# Patient Record
Sex: FEMALE | Race: WHITE | NOT HISPANIC OR LATINO | Employment: OTHER | URBAN - METROPOLITAN AREA
[De-identification: names, ages, dates, MRNs, and addresses within clinical notes are randomized per-mention and may not be internally consistent; named-entity substitution may affect disease eponyms.]

---

## 2023-03-27 ENCOUNTER — NURSING HOME VISIT (OUTPATIENT)
Dept: POST ACUTE CARE | Facility: EXTERNAL LOCATION | Age: 88
End: 2023-03-27
Payer: MEDICARE

## 2023-03-27 DIAGNOSIS — I15.9 SECONDARY HYPERTENSION: ICD-10-CM

## 2023-03-27 DIAGNOSIS — E03.9 HYPOTHYROIDISM, UNSPECIFIED TYPE: ICD-10-CM

## 2023-03-27 DIAGNOSIS — I48.91 ATRIAL FIBRILLATION, UNSPECIFIED TYPE (MULTI): ICD-10-CM

## 2023-03-27 DIAGNOSIS — G20.A1 PARKINSON'S DISEASE (MULTI): ICD-10-CM

## 2023-03-27 DIAGNOSIS — F41.8 DEPRESSION WITH ANXIETY: ICD-10-CM

## 2023-03-27 DIAGNOSIS — E56.9 VITAMIN DEFICIENCY: ICD-10-CM

## 2023-03-27 DIAGNOSIS — F03.C3 SEVERE DEMENTIA WITH MOOD DISTURBANCE, UNSPECIFIED DEMENTIA TYPE (MULTI): ICD-10-CM

## 2023-03-27 DIAGNOSIS — K21.9 GASTROESOPHAGEAL REFLUX DISEASE, UNSPECIFIED WHETHER ESOPHAGITIS PRESENT: ICD-10-CM

## 2023-03-27 PROCEDURE — 99309 SBSQ NF CARE MODERATE MDM 30: CPT | Performed by: INTERNAL MEDICINE

## 2023-03-27 NOTE — LETTER
Patient: Shanon Smith  : 8/3/1931    Encounter Date: 2023    Name: Shanon Smith  : 8/3/1931  MRN: [id number]  Visit Date: 3/27/2023  Chief Complaint: Monthly visit for management of chronic disease    HPI: 89 y/o CF who was admitted to [facility] on  from home with family for long term care due to her progressive dementia with behavioral disturbances. Patient will be admitted to the secure behavioral care unit at [facility].    Subjective: Seen and examined today. Laying in bed. Pleasantly confused on exam. Offers no complaints. Nursing reports no issues. I have reviewed nursing notes since my last visit and document any significant changes Reviewed orders, medications, Labs. Reviewed and updated Interval hx and meds reviewed. Reviewed chart looking at current medications, treatments, labs, x-rays etc.     ROS:  As above in subjective. Otherwise, all other systems have been reviewed and are negative for complaint.    Medications:  Medications reviewed and verified in NH chart.     Allergies:  Erythromycin, Bactrim     Vital Signs: Reviewed in [facility]    Physical Exam:  CONSTITUTIONAL: alert and oriented to self, confusion, no distress, cooperative, guarded  SKIN: Warm & Dry, no lesions, no rashes.  EYES: EOMI, clear sclera  ENMT: Mucous membranes moist, no apparent injury, no lesions seen. [place].   HEAD/NECK: No lymphadenopathy, thyromegaly or JVD.  HEART: Regular rate & rhythm, no murmurs, 2+ equal pulses of the extremities, Normal S1 & S2.  LUNGS: Patent airways, CTAB, normal breath sounds with good chest expansion, thorax symmetric  ABDOMEN: Nondistended, soft, slightly tender, +BS, no rebound tenderness or guarding.   EXTREMITIES: Normal extremities, no cyanosis, trace ankle edema, no contusions, no clubbing  NEUROLOGIC: intact senses, motor, response and reflexes ok, weakness    Results/Data:   Lab Results   Component Value Date    WBC 4.3 (L) 2021    HGB 13.8 2021    HCT 41.8  06/22/2021     (L) 06/22/2021    ALT 5 (L) 06/22/2021    AST 15 06/22/2021     06/22/2021    K 4.2 06/22/2021     06/22/2021    CREATININE 1.14 (H) 06/22/2021    BUN 22 06/22/2021    CO2 28 06/22/2021       Provider Impression:   Dementia with Behavioral Disturbances, Depression, Anxiety  - remain on secure behavioral unit  - continue with fluvoxamine, buspar  - fall precautions  - monitor behaviors  - consult psych as needed    Parkinson's Disease  - continue with carbidopa-levodopa at scheduled times  - monitor    CV- Afib, HTN, CHF  - continue with Eliqus for stroke prevention  - continue with digoxin for rate control  - monitor level PRN    Recurrent UTI's  - continue with suppressive atb with keflex  - continue with cranberry capsules    Constipation Prevention  - c/w stool softeners and laxatives PRN  - having regular bowel movements    Hypothyroidism  - continue with synthroid  - monitor level PRN    GERD  - continue with pepcid    Vitamin D + B12 deficiency  - continue with supplements    Insomnia  - continue with melatonin    Code Status  - [facility]  ----------------  Written by Balbina Mclaughlin LPN, acting as a scribe for Dr. Corrales. This note accurately reflects the work and decisions made by Dr. Corrales.     I, Dr. Corrales, attest all medical record entries made by the scribe were under my direction and were personally dictated by me. I have reviewed the chart and agree that the record accurately reflects my performance of the history, physical exam, and assessment and plan.       Electronically Signed By: Noni Land MD   5/16/23  1:17 PM

## 2023-05-04 ENCOUNTER — NURSING HOME VISIT (OUTPATIENT)
Dept: POST ACUTE CARE | Facility: EXTERNAL LOCATION | Age: 88
End: 2023-05-04
Payer: MEDICARE

## 2023-05-04 DIAGNOSIS — E03.9 HYPOTHYROIDISM, UNSPECIFIED TYPE: ICD-10-CM

## 2023-05-04 DIAGNOSIS — E56.9 VITAMIN DEFICIENCY: ICD-10-CM

## 2023-05-04 DIAGNOSIS — I15.9 SECONDARY HYPERTENSION: ICD-10-CM

## 2023-05-04 DIAGNOSIS — F41.8 DEPRESSION WITH ANXIETY: ICD-10-CM

## 2023-05-04 DIAGNOSIS — K21.9 GASTROESOPHAGEAL REFLUX DISEASE, UNSPECIFIED WHETHER ESOPHAGITIS PRESENT: ICD-10-CM

## 2023-05-04 DIAGNOSIS — I48.91 ATRIAL FIBRILLATION, UNSPECIFIED TYPE (MULTI): ICD-10-CM

## 2023-05-04 DIAGNOSIS — F03.C3 SEVERE DEMENTIA WITH MOOD DISTURBANCE, UNSPECIFIED DEMENTIA TYPE (MULTI): ICD-10-CM

## 2023-05-04 DIAGNOSIS — G20.A1 PARKINSON'S DISEASE (MULTI): ICD-10-CM

## 2023-05-04 PROCEDURE — 99308 SBSQ NF CARE LOW MDM 20: CPT | Performed by: INTERNAL MEDICINE

## 2023-05-04 NOTE — LETTER
Patient: Shanon Smith  : 8/3/1931    Encounter Date: 2023    Name: Shanon Smith  : 8/3/1931  MRN: [id number]  Visit Date: 2023  Chief Complaint: Monthly visit for management of chronic disease    HPI: 89 y/o CF who was admitted to [facility] on  from home with family for long term care due to her progressive dementia with behavioral disturbances. Patient will be admitted to the secure behavioral care unit at [facility].    Subjective: Seen and examined today. Sitting up in wheelchair in common area, watching TV with a friend.. Pleasantly confused on exam. Offers no complaints. Nursing reports no issues. I have reviewed nursing notes since my last visit and document any significant changes Reviewed orders, medications, Labs. Reviewed and updated Interval hx and meds reviewed. Reviewed chart looking at current medications, treatments, labs, x-rays etc.     ROS:  As above in subjective. Otherwise, all other systems have been reviewed and are negative for complaint.    Medications:  Medications reviewed and verified in NH chart.     Allergies:  Erythromycin, Bactrim     Vital Signs: Reviewed in [facility]    Physical Exam:  CONSTITUTIONAL: alert and oriented to self, confusion, no distress, cooperative, guarded  SKIN: Warm & Dry, no lesions, no rashes.  EYES: EOMI, clear sclera  ENMT: Mucous membranes moist, no apparent injury, no lesions seen. [place].   HEAD/NECK: No lymphadenopathy, thyromegaly or JVD.  HEART: Regular rate & rhythm, no murmurs, 2+ equal pulses of the extremities, Normal S1 & S2.  LUNGS: Patent airways, CTAB, normal breath sounds with good chest expansion, thorax symmetric  ABDOMEN: Nondistended, soft, slightly tender, +BS, no rebound tenderness or guarding.   EXTREMITIES: Normal extremities, no cyanosis, trace ankle edema, no contusions, no clubbing  NEUROLOGIC: intact senses, motor, response and reflexes ok, weakness    Results/Data:   Lab Results   Component Value Date    WBC 4.3 (L)  06/22/2021    HGB 13.8 06/22/2021    HCT 41.8 06/22/2021     (L) 06/22/2021    ALT 5 (L) 06/22/2021    AST 15 06/22/2021     06/22/2021    K 4.2 06/22/2021     06/22/2021    CREATININE 1.14 (H) 06/22/2021    BUN 22 06/22/2021    CO2 28 06/22/2021       Provider Impression:   Dementia with Behavioral Disturbances, Depression, Anxiety  - remain on secure behavioral unit  - continue with fluvoxamine, buspar  - fall precautions  - monitor behaviors  - consult psych as needed    Parkinson's Disease  - continue with carbidopa-levodopa at scheduled times  - monitor    CV- Afib, HTN, CHF  - continue with Eliqus for stroke prevention  - continue with digoxin for rate control  - monitor level PRN    Recurrent UTI's  - continue with suppressive atb with keflex  - continue with cranberry capsules    Constipation Prevention  - c/w stool softeners and laxatives PRN  - having regular bowel movements    Hypothyroidism  - continue with synthroid  - monitor level PRN    GERD  - continue with pepcid    Vitamin D + B12 deficiency  - continue with supplements    Insomnia  - continue with melatonin    Code Status  - [facility]  ----------------  Written by Balbina Mclaughlin LPN, acting as a scribe for Dr. Corrales. This note accurately reflects the work and decisions made by Dr. Corrales.     I, Dr. Corrales, attest all medical record entries made by the scribe were under my direction and were personally dictated by me. I have reviewed the chart and agree that the record accurately reflects my performance of the history, physical exam, and assessment and plan.       Electronically Signed By: Noni Land MD   5/16/23  9:03 AM

## 2023-05-10 PROBLEM — E03.9 HYPOTHYROIDISM: Status: ACTIVE | Noted: 2023-05-10

## 2023-05-10 PROBLEM — K21.9 GASTROESOPHAGEAL REFLUX DISEASE: Status: ACTIVE | Noted: 2023-05-10

## 2023-05-10 PROBLEM — G20.A1 PARKINSON'S DISEASE (MULTI): Status: ACTIVE | Noted: 2023-05-10

## 2023-05-10 PROBLEM — I48.91 ATRIAL FIBRILLATION (MULTI): Status: ACTIVE | Noted: 2023-05-10

## 2023-05-10 PROBLEM — I15.9 SECONDARY HYPERTENSION: Status: ACTIVE | Noted: 2023-05-10

## 2023-05-10 PROBLEM — F41.8 DEPRESSION WITH ANXIETY: Status: ACTIVE | Noted: 2023-05-10

## 2023-05-10 PROBLEM — E56.9 VITAMIN DEFICIENCY: Status: ACTIVE | Noted: 2023-05-10

## 2023-05-10 PROBLEM — F03.C3 SEVERE DEMENTIA WITH MOOD DISTURBANCE (MULTI): Status: ACTIVE | Noted: 2023-05-10

## 2023-05-10 NOTE — PROGRESS NOTES
Name: Shanon Smith  : 8/3/1931  MRN: [id number]  Visit Date: 3/27/2023  Chief Complaint: Monthly visit for management of chronic disease    HPI: 89 y/o CF who was admitted to [facility] on  from home with family for long term care due to her progressive dementia with behavioral disturbances. Patient will be admitted to the secure behavioral care unit at [facility].    Subjective: Seen and examined today. Laying in bed. Pleasantly confused on exam. Offers no complaints. Nursing reports no issues. I have reviewed nursing notes since my last visit and document any significant changes Reviewed orders, medications, Labs. Reviewed and updated Interval hx and meds reviewed. Reviewed chart looking at current medications, treatments, labs, x-rays etc.     ROS:  As above in subjective. Otherwise, all other systems have been reviewed and are negative for complaint.    Medications:  Medications reviewed and verified in NH chart.     Allergies:  Erythromycin, Bactrim     Vital Signs: Reviewed in [facility]    Physical Exam:  CONSTITUTIONAL: alert and oriented to self, confusion, no distress, cooperative, guarded  SKIN: Warm & Dry, no lesions, no rashes.  EYES: EOMI, clear sclera  ENMT: Mucous membranes moist, no apparent injury, no lesions seen. [place].   HEAD/NECK: No lymphadenopathy, thyromegaly or JVD.  HEART: Regular rate & rhythm, no murmurs, 2+ equal pulses of the extremities, Normal S1 & S2.  LUNGS: Patent airways, CTAB, normal breath sounds with good chest expansion, thorax symmetric  ABDOMEN: Nondistended, soft, slightly tender, +BS, no rebound tenderness or guarding.   EXTREMITIES: Normal extremities, no cyanosis, trace ankle edema, no contusions, no clubbing  NEUROLOGIC: intact senses, motor, response and reflexes ok, weakness    Results/Data:   Lab Results   Component Value Date    WBC 4.3 (L) 2021    HGB 13.8 2021    HCT 41.8 2021     (L) 2021    ALT 5 (L) 2021    AST 15  06/22/2021     06/22/2021    K 4.2 06/22/2021     06/22/2021    CREATININE 1.14 (H) 06/22/2021    BUN 22 06/22/2021    CO2 28 06/22/2021       Provider Impression:   Dementia with Behavioral Disturbances, Depression, Anxiety  - remain on secure behavioral unit  - continue with fluvoxamine, buspar  - fall precautions  - monitor behaviors  - consult psych as needed    Parkinson's Disease  - continue with carbidopa-levodopa at scheduled times  - monitor    CV- Afib, HTN, CHF  - continue with Eliqus for stroke prevention  - continue with digoxin for rate control  - monitor level PRN    Recurrent UTI's  - continue with suppressive atb with keflex  - continue with cranberry capsules    Constipation Prevention  - c/w stool softeners and laxatives PRN  - having regular bowel movements    Hypothyroidism  - continue with synthroid  - monitor level PRN    GERD  - continue with pepcid    Vitamin D + B12 deficiency  - continue with supplements    Insomnia  - continue with melatonin    Code Status  - [facility]  ----------------  Written by Balbina Mclaughlin LPN, acting as a scribe for Dr. Corrales. This note accurately reflects the work and decisions made by Dr. Corrales.     I, Dr. Corrales, attest all medical record entries made by the scribe were under my direction and were personally dictated by me. I have reviewed the chart and agree that the record accurately reflects my performance of the history, physical exam, and assessment and plan.

## 2023-05-15 NOTE — PROGRESS NOTES
Name: Shanon Smith  : 8/3/1931  MRN: [id number]  Visit Date: 2023  Chief Complaint: Monthly visit for management of chronic disease    HPI: 89 y/o CF who was admitted to [facility] on  from home with family for long term care due to her progressive dementia with behavioral disturbances. Patient will be admitted to the secure behavioral care unit at [facility].    Subjective: Seen and examined today. Sitting up in wheelchair in common area, watching TV with a friend.. Pleasantly confused on exam. Offers no complaints. Nursing reports no issues. I have reviewed nursing notes since my last visit and document any significant changes Reviewed orders, medications, Labs. Reviewed and updated Interval hx and meds reviewed. Reviewed chart looking at current medications, treatments, labs, x-rays etc.     ROS:  As above in subjective. Otherwise, all other systems have been reviewed and are negative for complaint.    Medications:  Medications reviewed and verified in NH chart.     Allergies:  Erythromycin, Bactrim     Vital Signs: Reviewed in [facility]    Physical Exam:  CONSTITUTIONAL: alert and oriented to self, confusion, no distress, cooperative, guarded  SKIN: Warm & Dry, no lesions, no rashes.  EYES: EOMI, clear sclera  ENMT: Mucous membranes moist, no apparent injury, no lesions seen. [place].   HEAD/NECK: No lymphadenopathy, thyromegaly or JVD.  HEART: Regular rate & rhythm, no murmurs, 2+ equal pulses of the extremities, Normal S1 & S2.  LUNGS: Patent airways, CTAB, normal breath sounds with good chest expansion, thorax symmetric  ABDOMEN: Nondistended, soft, slightly tender, +BS, no rebound tenderness or guarding.   EXTREMITIES: Normal extremities, no cyanosis, trace ankle edema, no contusions, no clubbing  NEUROLOGIC: intact senses, motor, response and reflexes ok, weakness    Results/Data:   Lab Results   Component Value Date    WBC 4.3 (L) 2021    HGB 13.8 2021    HCT 41.8 2021     (L)  06/22/2021    ALT 5 (L) 06/22/2021    AST 15 06/22/2021     06/22/2021    K 4.2 06/22/2021     06/22/2021    CREATININE 1.14 (H) 06/22/2021    BUN 22 06/22/2021    CO2 28 06/22/2021       Provider Impression:   Dementia with Behavioral Disturbances, Depression, Anxiety  - remain on secure behavioral unit  - continue with fluvoxamine, buspar  - fall precautions  - monitor behaviors  - consult psych as needed    Parkinson's Disease  - continue with carbidopa-levodopa at scheduled times  - monitor    CV- Afib, HTN, CHF  - continue with Eliqus for stroke prevention  - continue with digoxin for rate control  - monitor level PRN    Recurrent UTI's  - continue with suppressive atb with keflex  - continue with cranberry capsules    Constipation Prevention  - c/w stool softeners and laxatives PRN  - having regular bowel movements    Hypothyroidism  - continue with synthroid  - monitor level PRN    GERD  - continue with pepcid    Vitamin D + B12 deficiency  - continue with supplements    Insomnia  - continue with melatonin    [facility]  [facility]  Written by Balbina Mclaughlin LPN, acting as a scribe for Dr. Corrales. This note accurately reflects the work and decisions made by Dr. Corrales.     I, Dr. Corrales, attest all medical record entries made by the scribe were under my direction and were personally dictated by me. I have reviewed the chart and agree that the record accurately reflects my performance of the history, physical exam, and assessment and plan.

## 2023-07-15 ENCOUNTER — NURSING HOME VISIT (OUTPATIENT)
Dept: POST ACUTE CARE | Facility: EXTERNAL LOCATION | Age: 88
End: 2023-07-15
Payer: MEDICARE

## 2023-07-15 DIAGNOSIS — K21.9 GASTROESOPHAGEAL REFLUX DISEASE, UNSPECIFIED WHETHER ESOPHAGITIS PRESENT: ICD-10-CM

## 2023-07-15 DIAGNOSIS — F41.8 DEPRESSION WITH ANXIETY: ICD-10-CM

## 2023-07-15 DIAGNOSIS — E03.9 HYPOTHYROIDISM, UNSPECIFIED TYPE: ICD-10-CM

## 2023-07-15 DIAGNOSIS — E56.9 VITAMIN DEFICIENCY: ICD-10-CM

## 2023-07-15 DIAGNOSIS — I15.9 SECONDARY HYPERTENSION: ICD-10-CM

## 2023-07-15 DIAGNOSIS — G20.A1 PARKINSON'S DISEASE (MULTI): ICD-10-CM

## 2023-07-15 DIAGNOSIS — I48.91 ATRIAL FIBRILLATION, UNSPECIFIED TYPE (MULTI): ICD-10-CM

## 2023-07-15 DIAGNOSIS — F03.C3 SEVERE DEMENTIA WITH MOOD DISTURBANCE, UNSPECIFIED DEMENTIA TYPE (MULTI): ICD-10-CM

## 2023-07-15 PROCEDURE — 99308 SBSQ NF CARE LOW MDM 20: CPT | Performed by: INTERNAL MEDICINE

## 2023-07-15 NOTE — LETTER
Patient: Shanon Smith  : 8/3/1931    Encounter Date: 07/15/2023    Name: Shanon Smith  : 8/3/1931  MRN: [id number]  Visit Date: 7/15/2023  Chief Complaint: Monthly visit for management of chronic disease    HPI: 89 y/o CF who was admitted to [facility] on  from home with family for long term care due to her progressive dementia with behavioral disturbances. Patient will be admitted to the secure behavioral care unit at [facility].    Subjective: Seen and examined today. Sitting up in wheelchair in common area. Pleasantly confused on exam. Offers no complaints. Nursing reports no issues. I have reviewed nursing notes since my last visit and document any significant changes Reviewed orders, medications, Labs. Reviewed and updated Interval hx and meds reviewed. Reviewed chart looking at current medications, treatments, labs, x-rays etc.     ROS:  As above in subjective. Otherwise, all other systems have been reviewed and are negative for complaint.    Medications:  Medications reviewed and verified in NH chart.     Allergies:  Erythromycin, Bactrim     Vital Signs: Reviewed in [facility]    Physical Exam:  CONSTITUTIONAL: alert and oriented to self, confusion, no distress, cooperative, guarded  SKIN: Warm & Dry, no lesions, no rashes.  EYES: EOMI, clear sclera  ENMT: Mucous membranes moist, no apparent injury, no lesions seen. [place].   HEAD/NECK: No lymphadenopathy, thyromegaly or JVD.  HEART: Regular rate & rhythm, no murmurs, 2+ equal pulses of the extremities, Normal S1 & S2.  LUNGS: Patent airways, CTAB, normal breath sounds with good chest expansion, thorax symmetric  ABDOMEN: Nondistended, soft, slightly tender, +BS, no rebound tenderness or guarding.   EXTREMITIES: Normal extremities, no cyanosis, trace ankle edema, no contusions, no clubbing  NEUROLOGIC: intact senses, motor, response and reflexes ok, weakness    Results/Data:   Lab Results   Component Value Date    WBC 5.8 2023    HGB 13.2  08/16/2023    HCT 41.1 08/16/2023     08/16/2023    ALT 6 (L) 08/16/2023    AST 21 08/16/2023     08/16/2023    K 4.3 08/16/2023     08/16/2023    CREATININE 1.19 (H) 08/16/2023    BUN 20 08/16/2023    CO2 26 08/16/2023    INR 1.3 (H) 08/16/2023       Provider Impression:   Dementia with Behavioral Disturbances, Depression, Anxiety  - remain on secure behavioral unit  - continue with fluvoxamine, buspar  - fall precautions  - monitor behaviors  - consult psych as needed    Parkinson's Disease  - continue with carbidopa-levodopa at scheduled times  - monitor    CV- Afib, HTN, CHF  - continue with Eliqus for stroke prevention  - continue with digoxin for rate control  - monitor level PRN    Recurrent UTI's  - continue with suppressive atb with keflex  - continue with cranberry capsules    Constipation Prevention  - c/w stool softeners and laxatives PRN  - having regular bowel movements    Hypothyroidism  - continue with synthroid  - monitor level PRN    GERD  - continue with pepcid    Vitamin D + B12 deficiency  - continue with supplements    Insomnia  - continue with melatonin    Code Status  - [facility]  ----------------  Written by Balbina Mclaughlin RN, acting as a scribe for Dr. Corrales. This note accurately reflects the work and decisions made by Dr. Corrales.     I, Dr. Corrales, attest all medical record entries made by the scribe were under my direction and were personally dictated by me. I have reviewed the chart and agree that the record accurately reflects my performance of the history, physical exam, and assessment and plan.       Electronically Signed By: Noni Land MD   9/6/23  3:56 PM

## 2023-09-06 PROBLEM — Z78.9 NURSING HOME RESIDENT: Status: ACTIVE | Noted: 2023-09-06

## 2023-09-06 NOTE — PROGRESS NOTES
Name: Shanon Smith  : 8/3/1931  MRN: [id number]  Visit Date: 7/15/2023  Chief Complaint: Monthly visit for management of chronic disease    HPI: 89 y/o CF who was admitted to [facility] on  from home with family for long term care due to her progressive dementia with behavioral disturbances. Patient will be admitted to the secure behavioral care unit at [facility].    Subjective: Seen and examined today. Sitting up in wheelchair in common area. Pleasantly confused on exam. Offers no complaints. Nursing reports no issues. I have reviewed nursing notes since my last visit and document any significant changes Reviewed orders, medications, Labs. Reviewed and updated Interval hx and meds reviewed. Reviewed chart looking at current medications, treatments, labs, x-rays etc.     ROS:  As above in subjective. Otherwise, all other systems have been reviewed and are negative for complaint.    Medications:  Medications reviewed and verified in NH chart.     Allergies:  Erythromycin, Bactrim     Vital Signs: Reviewed in [facility]    Physical Exam:  CONSTITUTIONAL: alert and oriented to self, confusion, no distress, cooperative, guarded  SKIN: Warm & Dry, no lesions, no rashes.  EYES: EOMI, clear sclera  ENMT: Mucous membranes moist, no apparent injury, no lesions seen. [place].   HEAD/NECK: No lymphadenopathy, thyromegaly or JVD.  HEART: Regular rate & rhythm, no murmurs, 2+ equal pulses of the extremities, Normal S1 & S2.  LUNGS: Patent airways, CTAB, normal breath sounds with good chest expansion, thorax symmetric  ABDOMEN: Nondistended, soft, slightly tender, +BS, no rebound tenderness or guarding.   EXTREMITIES: Normal extremities, no cyanosis, trace ankle edema, no contusions, no clubbing  NEUROLOGIC: intact senses, motor, response and reflexes ok, weakness    Results/Data:   Lab Results   Component Value Date    WBC 5.8 2023    HGB 13.2 2023    HCT 41.1 2023     2023    ALT 6 (L)  08/16/2023    AST 21 08/16/2023     08/16/2023    K 4.3 08/16/2023     08/16/2023    CREATININE 1.19 (H) 08/16/2023    BUN 20 08/16/2023    CO2 26 08/16/2023    INR 1.3 (H) 08/16/2023       Provider Impression:   Dementia with Behavioral Disturbances, Depression, Anxiety  - remain on secure behavioral unit  - continue with fluvoxamine, buspar  - fall precautions  - monitor behaviors  - consult psych as needed    Parkinson's Disease  - continue with carbidopa-levodopa at scheduled times  - monitor    CV- Afib, HTN, CHF  - continue with Eliqus for stroke prevention  - continue with digoxin for rate control  - monitor level PRN    Recurrent UTI's  - continue with suppressive atb with keflex  - continue with cranberry capsules    Constipation Prevention  - c/w stool softeners and laxatives PRN  - having regular bowel movements    Hypothyroidism  - continue with synthroid  - monitor level PRN    GERD  - continue with pepcid    Vitamin D + B12 deficiency  - continue with supplements    Insomnia  - continue with melatonin    [facility]  [facility]  Written by Balbina Mclaughlin RN, acting as a scribe for Dr. Corrales. This note accurately reflects the work and decisions made by Dr. Corrales.     I, Dr. Corrales, attest all medical record entries made by the scribe were under my direction and were personally dictated by me. I have reviewed the chart and agree that the record accurately reflects my performance of the history, physical exam, and assessment and plan.

## 2023-09-09 ENCOUNTER — NURSING HOME VISIT (OUTPATIENT)
Dept: POST ACUTE CARE | Facility: EXTERNAL LOCATION | Age: 88
End: 2023-09-09
Payer: MEDICARE

## 2023-09-09 DIAGNOSIS — K21.9 GASTROESOPHAGEAL REFLUX DISEASE, UNSPECIFIED WHETHER ESOPHAGITIS PRESENT: ICD-10-CM

## 2023-09-09 DIAGNOSIS — E56.9 VITAMIN DEFICIENCY: ICD-10-CM

## 2023-09-09 DIAGNOSIS — G20.A1 PARKINSON'S DISEASE, UNSPECIFIED WHETHER DYSKINESIA PRESENT, UNSPECIFIED WHETHER MANIFESTATIONS FLUCTUATE (MULTI): ICD-10-CM

## 2023-09-09 DIAGNOSIS — F03.C3 SEVERE DEMENTIA WITH MOOD DISTURBANCE, UNSPECIFIED DEMENTIA TYPE (MULTI): ICD-10-CM

## 2023-09-09 DIAGNOSIS — I15.9 SECONDARY HYPERTENSION: ICD-10-CM

## 2023-09-09 DIAGNOSIS — E03.9 HYPOTHYROIDISM, UNSPECIFIED TYPE: ICD-10-CM

## 2023-09-09 DIAGNOSIS — I48.91 ATRIAL FIBRILLATION, UNSPECIFIED TYPE (MULTI): ICD-10-CM

## 2023-09-09 DIAGNOSIS — F41.8 DEPRESSION WITH ANXIETY: ICD-10-CM

## 2023-09-09 PROCEDURE — 99308 SBSQ NF CARE LOW MDM 20: CPT | Performed by: INTERNAL MEDICINE

## 2023-09-09 NOTE — LETTER
Patient: Shanon Smith  : 8/3/1931    Encounter Date: 2023    Name: Shanon Smith  : 8/3/1931  MRN: [id number]  Visit Date: 2023  Chief Complaint: Monthly visit for management of chronic disease    HPI: 89 y/o CF who was admitted to [facility] on  from home with family for long term care due to her progressive dementia with behavioral disturbances. Patient will be admitted to the secure behavioral care unit at [facility].    Subjective: Seen and examined today. Sitting up in wheelchair in common area. Pleasantly confused on exam. Offers no complaints. Nursing reports no issues. I have reviewed nursing notes since my last visit and document any significant changes Reviewed orders, medications, Labs. Reviewed and updated Interval hx and meds reviewed. Reviewed chart looking at current medications, treatments, labs, x-rays etc.     ROS:  As above in subjective. Otherwise, all other systems have been reviewed and are negative for complaint.    Medications:  Medications reviewed and verified in NH chart.     Vital Signs: Reviewed in [facility]    Physical Exam:  CONSTITUTIONAL: alert and oriented to self, confusion, no distress, cooperative, guarded  SKIN: Warm & Dry, no lesions, no rashes.  EYES: EOMI, clear sclera  ENMT: Mucous membranes moist, no apparent injury, no lesions seen. [place].   HEAD/NECK: No lymphadenopathy, thyromegaly or JVD.  HEART: Regular rate & rhythm, no murmurs, 2+ equal pulses of the extremities, Normal S1 & S2.  LUNGS: Patent airways, CTAB, normal breath sounds with good chest expansion, thorax symmetric  ABDOMEN: Nondistended, soft, slightly tender, +BS, no rebound tenderness or guarding.   EXTREMITIES: Normal extremities, no cyanosis, trace ankle edema, no contusions, no clubbing  NEUROLOGIC: intact senses, motor, response and reflexes ok, weakness    Results/Data:   Lab Results   Component Value Date    WBC 5.8 2023    HGB 13.2 2023    HCT 41.1 2023      08/16/2023    CHOL 163 12/16/2020    TRIG 112 12/16/2020    HDL 50 (L) 12/16/2020    ALT 6 (L) 08/16/2023    AST 21 08/16/2023     08/16/2023    K 4.3 08/16/2023     08/16/2023    CREATININE 1.19 (H) 08/16/2023    BUN 20 08/16/2023    CO2 26 08/16/2023    TSH 0.71 12/16/2020    INR 1.3 (H) 08/16/2023    HGBA1C 6.0 02/14/2021     Provider Impression:   Dementia with Behavioral Disturbances, Depression, Anxiety  - remain on secure behavioral unit  - continue with fluvoxamine, buspar  - fall precautions  - monitor behaviors  - consult psych as needed    Parkinson's Disease  - continue with carbidopa-levodopa at scheduled times  - monitor    CV- Afib, HTN, CHF  - continue with Eliqus for stroke prevention  - continue with digoxin for rate control  - monitor level PRN    Recurrent UTI's  - continue with suppressive atb with keflex  - continue with cranberry capsules    Constipation Prevention  - c/w stool softeners and laxatives PRN  - having regular bowel movements    Hypothyroidism  - continue with synthroid  - monitor level PRN    GERD  - continue with pepcid    Vitamin D + B12 deficiency  - continue with supplements    Insomnia  - continue with melatonin    Code Status  - [facility]  ----------------  Written by Balbina Mclaughlin RN, acting as a scribe for Dr. Corrales. This note accurately reflects the work and decisions made by Dr. Corrales.     I, Dr. Corrales, attest all medical record entries made by the scribe were under my direction and were personally dictated by me. I have reviewed the chart and agree that the record accurately reflects my performance of the history, physical exam, and assessment and plan.     Electronically Signed By: Noni Land MD   1/5/24  1:00 PM

## 2023-11-03 DIAGNOSIS — G20.B1 PARKINSON'S DISEASE WITH DYSKINESIA, UNSPECIFIED WHETHER MANIFESTATIONS FLUCTUATE (MULTI): ICD-10-CM

## 2023-11-04 ENCOUNTER — NURSING HOME VISIT (OUTPATIENT)
Dept: POST ACUTE CARE | Facility: EXTERNAL LOCATION | Age: 88
End: 2023-11-04
Payer: MEDICARE

## 2023-11-04 DIAGNOSIS — K21.9 GASTROESOPHAGEAL REFLUX DISEASE, UNSPECIFIED WHETHER ESOPHAGITIS PRESENT: ICD-10-CM

## 2023-11-04 DIAGNOSIS — I15.9 SECONDARY HYPERTENSION: ICD-10-CM

## 2023-11-04 DIAGNOSIS — E03.9 HYPOTHYROIDISM, UNSPECIFIED TYPE: ICD-10-CM

## 2023-11-04 DIAGNOSIS — I48.91 ATRIAL FIBRILLATION, UNSPECIFIED TYPE (MULTI): ICD-10-CM

## 2023-11-04 DIAGNOSIS — F41.8 DEPRESSION WITH ANXIETY: ICD-10-CM

## 2023-11-04 DIAGNOSIS — N39.0 URINARY TRACT INFECTION WITHOUT HEMATURIA, SITE UNSPECIFIED: ICD-10-CM

## 2023-11-04 DIAGNOSIS — G20.A1 PARKINSON'S DISEASE, UNSPECIFIED WHETHER DYSKINESIA PRESENT, UNSPECIFIED WHETHER MANIFESTATIONS FLUCTUATE (MULTI): ICD-10-CM

## 2023-11-04 DIAGNOSIS — F03.C3 SEVERE DEMENTIA WITH MOOD DISTURBANCE, UNSPECIFIED DEMENTIA TYPE (MULTI): ICD-10-CM

## 2023-11-04 PROCEDURE — 99308 SBSQ NF CARE LOW MDM 20: CPT | Performed by: INTERNAL MEDICINE

## 2023-11-04 NOTE — LETTER
Patient: Shanon Smith  : 8/3/1931    Encounter Date: 2023    Name: Shanon Smith  : 8/3/1931  MRN: [id number]  Visit Date: 2023  Chief Complaint: Monthly visit for management of chronic disease    HPI: 89 y/o CF who was admitted to [facility] on  from home with family for long term care due to her progressive dementia with behavioral disturbances. Patient will be admitted to the secure behavioral care unit at [facility].    Subjective: Seen and examined today. Sitting up in wheelchair in common area. Pleasantly confused on exam. Offers no complaints. Nursing reports no issues. I have reviewed nursing notes since my last visit and document any significant changes Reviewed orders, medications, Labs. Reviewed and updated Interval hx and meds reviewed. Reviewed chart looking at current medications, treatments, labs, x-rays etc.     ROS:  As above in subjective. Otherwise, all other systems have been reviewed and are negative for complaint.    Medications:  Medications reviewed and verified in NH chart.     Vital Signs: Reviewed in [facility]    Physical Exam:  CONSTITUTIONAL: alert and oriented to self, confusion, no distress, cooperative, guarded  SKIN: Warm & Dry, no lesions, no rashes.  EYES: EOMI, clear sclera  ENMT: Mucous membranes moist, no apparent injury, no lesions seen. [place].   HEAD/NECK: No lymphadenopathy, thyromegaly or JVD.  HEART: Regular rate & rhythm, no murmurs, 2+ equal pulses of the extremities, Normal S1 & S2.  LUNGS: Patent airways, CTAB, normal breath sounds with good chest expansion, thorax symmetric  ABDOMEN: Nondistended, soft, slightly tender, +BS, no rebound tenderness or guarding.   EXTREMITIES: Normal extremities, no cyanosis, trace ankle edema, no contusions, no clubbing  NEUROLOGIC: intact senses, motor, response and reflexes ok, weakness    Results/Data:   Lab Results   Component Value Date    WBC 5.8 2023    HGB 13.2 2023    HCT 41.1 2023    PLT  179 08/16/2023    CHOL 163 12/16/2020    TRIG 112 12/16/2020    HDL 50 (L) 12/16/2020    ALT 6 (L) 08/16/2023    AST 21 08/16/2023     08/16/2023    K 4.3 08/16/2023     08/16/2023    CREATININE 1.19 (H) 08/16/2023    BUN 20 08/16/2023    CO2 26 08/16/2023    TSH 0.71 12/16/2020    INR 1.3 (H) 08/16/2023    HGBA1C 6.0 02/14/2021     Provider Impression:   Dementia with Behavioral Disturbances, Depression, Anxiety  - remain on secure behavioral unit  - continue with fluvoxamine, buspar  - fall precautions  - monitor behaviors  - consult psych as needed    UTI  - c/w keflex 500mg TID x7d  - denies dysuria    Parkinson's Disease  - continue with carbidopa-levodopa at scheduled times  - monitor    CV- Afib, HTN, CHF  - continue with Eliqus for stroke prevention  - continue with digoxin for rate control  - monitor level PRN    Recurrent UTI's  - continue with suppressive atb with keflex  - continue with cranberry capsules    Constipation Prevention  - c/w stool softeners and laxatives PRN  - having regular bowel movements    Hypothyroidism  - continue with synthroid  - monitor level PRN    GERD  - continue with pepcid    Vitamin D + B12 deficiency  - continue with supplements    Insomnia  - continue with melatonin    Code Status  - [facility]  ----------------  Written by Balbina Mclaughlin RN, acting as a scribe for Dr. Corrales. This note accurately reflects the work and decisions made by Dr. Corrales.     I, Dr. Corrales, attest all medical record entries made by the scribe were under my direction and were personally dictated by me. I have reviewed the chart and agree that the record accurately reflects my performance of the history, physical exam, and assessment and plan.       Electronically Signed By: Noni Land MD   1/30/24 11:16 AM

## 2024-01-03 NOTE — PROGRESS NOTES
Name: Shanon Smith  : 8/3/1931  MRN: [id number]  Visit Date: 2023  Chief Complaint: Monthly visit for management of chronic disease    HPI: 89 y/o CF who was admitted to [facility] on  from home with family for long term care due to her progressive dementia with behavioral disturbances. Patient will be admitted to the secure behavioral care unit at [facility].    Subjective: Seen and examined today. Sitting up in wheelchair in common area. Pleasantly confused on exam. Offers no complaints. Nursing reports no issues. I have reviewed nursing notes since my last visit and document any significant changes Reviewed orders, medications, Labs. Reviewed and updated Interval hx and meds reviewed. Reviewed chart looking at current medications, treatments, labs, x-rays etc.     ROS:  As above in subjective. Otherwise, all other systems have been reviewed and are negative for complaint.    Medications:  Medications reviewed and verified in NH chart.     Vital Signs: Reviewed in [facility]    Physical Exam:  CONSTITUTIONAL: alert and oriented to self, confusion, no distress, cooperative, guarded  SKIN: Warm & Dry, no lesions, no rashes.  EYES: EOMI, clear sclera  ENMT: Mucous membranes moist, no apparent injury, no lesions seen. [place].   HEAD/NECK: No lymphadenopathy, thyromegaly or JVD.  HEART: Regular rate & rhythm, no murmurs, 2+ equal pulses of the extremities, Normal S1 & S2.  LUNGS: Patent airways, CTAB, normal breath sounds with good chest expansion, thorax symmetric  ABDOMEN: Nondistended, soft, slightly tender, +BS, no rebound tenderness or guarding.   EXTREMITIES: Normal extremities, no cyanosis, trace ankle edema, no contusions, no clubbing  NEUROLOGIC: intact senses, motor, response and reflexes ok, weakness    Results/Data:   Lab Results   Component Value Date    WBC 5.8 2023    HGB 13.2 2023    HCT 41.1 2023     2023    CHOL 163 2020    TRIG 112 2020    HDL 50 (L)  12/16/2020    ALT 6 (L) 08/16/2023    AST 21 08/16/2023     08/16/2023    K 4.3 08/16/2023     08/16/2023    CREATININE 1.19 (H) 08/16/2023    BUN 20 08/16/2023    CO2 26 08/16/2023    TSH 0.71 12/16/2020    INR 1.3 (H) 08/16/2023    HGBA1C 6.0 02/14/2021     Provider Impression:   Dementia with Behavioral Disturbances, Depression, Anxiety  - remain on secure behavioral unit  - continue with fluvoxamine, buspar  - fall precautions  - monitor behaviors  - consult psych as needed    Parkinson's Disease  - continue with carbidopa-levodopa at scheduled times  - monitor    CV- Afib, HTN, CHF  - continue with Eliqus for stroke prevention  - continue with digoxin for rate control  - monitor level PRN    Recurrent UTI's  - continue with suppressive atb with keflex  - continue with cranberry capsules    Constipation Prevention  - c/w stool softeners and laxatives PRN  - having regular bowel movements    Hypothyroidism  - continue with synthroid  - monitor level PRN    GERD  - continue with pepcid    Vitamin D + B12 deficiency  - continue with supplements    Insomnia  - continue with melatonin    Code Status  - [facility]  ----------------  Written by Balbina Mclaughlin RN, acting as a scribe for Dr. Corrales. This note accurately reflects the work and decisions made by Dr. Corrales.     I, Dr. Corrales, attest all medical record entries made by the scribe were under my direction and were personally dictated by me. I have reviewed the chart and agree that the record accurately reflects my performance of the history, physical exam, and assessment and plan.

## 2024-01-13 ENCOUNTER — NURSING HOME VISIT (OUTPATIENT)
Dept: POST ACUTE CARE | Facility: EXTERNAL LOCATION | Age: 89
End: 2024-01-13
Payer: MEDICARE

## 2024-01-13 DIAGNOSIS — E03.9 HYPOTHYROIDISM, UNSPECIFIED TYPE: ICD-10-CM

## 2024-01-13 DIAGNOSIS — G20.A1 PARKINSON'S DISEASE, UNSPECIFIED WHETHER DYSKINESIA PRESENT, UNSPECIFIED WHETHER MANIFESTATIONS FLUCTUATE (MULTI): ICD-10-CM

## 2024-01-13 DIAGNOSIS — I48.91 ATRIAL FIBRILLATION, UNSPECIFIED TYPE (MULTI): ICD-10-CM

## 2024-01-13 DIAGNOSIS — E56.9 VITAMIN DEFICIENCY: ICD-10-CM

## 2024-01-13 DIAGNOSIS — F03.C3 SEVERE DEMENTIA WITH MOOD DISTURBANCE, UNSPECIFIED DEMENTIA TYPE (MULTI): ICD-10-CM

## 2024-01-13 DIAGNOSIS — I15.9 SECONDARY HYPERTENSION: ICD-10-CM

## 2024-01-13 DIAGNOSIS — K21.9 GASTROESOPHAGEAL REFLUX DISEASE, UNSPECIFIED WHETHER ESOPHAGITIS PRESENT: ICD-10-CM

## 2024-01-13 DIAGNOSIS — F41.8 DEPRESSION WITH ANXIETY: ICD-10-CM

## 2024-01-13 PROCEDURE — 99308 SBSQ NF CARE LOW MDM 20: CPT | Performed by: INTERNAL MEDICINE

## 2024-01-13 NOTE — LETTER
Patient: Shanon Smith  : 8/3/1931    Encounter Date: 2024    Name: Shanon Smith  : 8/3/1931  MRN: [id number]  Visit Date: 2024  Chief Complaint: Monthly visit for management of chronic disease    HPI: 89 y/o CF who was admitted to [facility] on  from home with family for long term care due to her progressive dementia with behavioral disturbances. Patient will be admitted to the secure behavioral care unit at [facility].    Subjective: Seen and examined today. Sitting up in wheelchair in common area. Pleasantly confused on exam. Offers no complaints. Nursing reports no issues. I have reviewed nursing notes since my last visit and document any significant changes Reviewed orders, medications, Labs. Reviewed and updated Interval hx and meds reviewed. Reviewed chart looking at current medications, treatments, labs, x-rays etc.     ROS:  As above in subjective. Otherwise, all other systems have been reviewed and are negative for complaint.    Medications:  Current Outpatient Medications   Medication Instructions   • acetaminophen (TYLENOL) 650 mg, oral, Every 6 hours PRN   • alum-mag hydroxide-simeth (Maalox MAX) 400-400-40 mg/5 mL suspension 10 mL, oral, Nightly   • apixaban (ELIQUIS) 2.5 mg, oral, 2 times daily   • busPIRone (BUSPAR) 5 mg, oral, 2 times daily   • carbidopa-levodopa (Sinemet CR)  mg ER tablet 2 tablets, oral, 3 times daily, Do not crush, chew, or split.   • cephalexin (KEFLEX) 250 mg, oral, Daily, Ppx atb   • cholecalciferol, vitamin D3, 25 mcg (1,000 unit) tablet,chewable 2 tablets, oral, Daily   • cranberry extract 425 mg capsule 1 capsule, oral, Daily   • cyanocobalamin (VITAMIN B-12) 500 mcg, oral, Daily   • digoxin (Lanoxin) 125 MCG tablet 1 tablet, oral, 3 times weekly, On Mon / Wed / Fri.   • famotidine (PEPCID) 20 mg, oral, Daily   • folic acid (FOLVITE) 1 mg, oral, Daily   • ipratropium-albuteroL (Duo-Neb) 0.5-2.5 mg/3 mL nebulizer solution 3 mL, inhalation,  Every 6 hours PRN   • Lactobacillus acidophilus 100 mg (1 billion cell) capsule 1 capsule, oral, Daily   • levothyroxine (TIROSINT) 75 mcg, oral, Daily before breakfast   • loratadine (CLARITIN) 10 mg, oral, Daily   • melatonin 5 mg, oral, Nightly PRN   • white petrolatum-mineral oiL (Tears Naturale PM) 94-3 % ointment ophthalmic ointment 1 Application, Both Eyes, 3 times daily PRN      Visit Vitals  /60   Pulse 75   Temp 36.7 °C (98.1 °F)   Resp 18   Wt 69.4 kg (152 lb 14.4 oz)   SpO2 94%   BMI 30.88 kg/m²   Smoking Status Never   BSA 1.7 m²      Physical Exam:  CONSTITUTIONAL: alert and oriented to self, confusion, no distress, cooperative, guarded  SKIN: Warm & Dry, no lesions, no rashes.  EYES: EOMI, clear sclera  ENMT: Mucous membranes moist, no apparent injury, no lesions seen. [place].   HEAD/NECK: No lymphadenopathy, thyromegaly or JVD.  HEART: Regular rate & rhythm, no murmurs, 2+ equal pulses of the extremities, Normal S1 & S2.  LUNGS: Patent airways, CTAB, normal breath sounds with good chest expansion, thorax symmetric  ABDOMEN: Nondistended, soft, slightly tender, +BS, no rebound tenderness or guarding.   EXTREMITIES: Normal extremities, no cyanosis, trace ankle edema, no contusions, no clubbing  NEUROLOGIC: intact senses, motor, response and reflexes ok, weakness    Results/Data:   Lab Results   Component Value Date    WBC 5.8 08/16/2023    HGB 13.2 08/16/2023    HCT 41.1 08/16/2023     08/16/2023    CHOL 163 12/16/2020    TRIG 112 12/16/2020    HDL 50 (L) 12/16/2020    ALT 6 (L) 08/16/2023    AST 21 08/16/2023     08/16/2023    K 4.3 08/16/2023     08/16/2023    CREATININE 1.19 (H) 08/16/2023    BUN 20 08/16/2023    CO2 26 08/16/2023    TSH 0.71 12/16/2020    INR 1.3 (H) 08/16/2023    HGBA1C 6.0 02/14/2021     Provider Impression:   Dementia with Behavioral Disturbances, Depression, Anxiety  - remain on secure behavioral unit  - continue with fluvoxamine, buspar  - fall  precautions  - monitor behaviors  - consult psych as needed    Parkinson's Disease  - continue with carbidopa-levodopa at scheduled times  - monitor    CV- Afib, HTN, CHF  - continue with Eliqus for stroke prevention  - continue with digoxin for rate control  - monitor level PRN    Recurrent UTI's  - continue with suppressive atb with keflex  - continue with cranberry capsules    Constipation Prevention  - c/w stool softeners and laxatives PRN  - having regular bowel movements    Hypothyroidism  - continue with synthroid  - monitor level PRN    GERD  - continue with pepcid    Vitamin D + B12 deficiency  - continue with supplements    Insomnia  - continue with melatonin    Code Status  - [facility]  ----------------  Written by Balbina Mclaughlin RN, acting as a scribe for Dr. Corrales. This note accurately reflects the work and decisions made by Dr. Corrales.     I, Dr. Corrales, attest all medical record entries made by the scribe were under my direction and were personally dictated by me. I have reviewed the chart and agree that the record accurately reflects my performance of the history, physical exam, and assessment and plan.       Electronically Signed By: Noni Land MD   3/12/24 12:33 PM

## 2024-01-29 PROBLEM — N39.0 URINARY TRACT INFECTION WITHOUT HEMATURIA: Status: ACTIVE | Noted: 2024-01-29

## 2024-01-29 NOTE — PROGRESS NOTES
Name: Shanon Smith  : 8/3/1931  MRN: [id number]  Visit Date: 2023  Chief Complaint: Monthly visit for management of chronic disease    HPI: 89 y/o CF who was admitted to [facility] on  from home with family for long term care due to her progressive dementia with behavioral disturbances. Patient will be admitted to the secure behavioral care unit at [facility].    Subjective: Seen and examined today. Sitting up in wheelchair in common area. Pleasantly confused on exam. Offers no complaints. Nursing reports no issues. I have reviewed nursing notes since my last visit and document any significant changes Reviewed orders, medications, Labs. Reviewed and updated Interval hx and meds reviewed. Reviewed chart looking at current medications, treatments, labs, x-rays etc.     ROS:  As above in subjective. Otherwise, all other systems have been reviewed and are negative for complaint.    Medications:  Medications reviewed and verified in NH chart.     Vital Signs: Reviewed in [facility]    Physical Exam:  CONSTITUTIONAL: alert and oriented to self, confusion, no distress, cooperative, guarded  SKIN: Warm & Dry, no lesions, no rashes.  EYES: EOMI, clear sclera  ENMT: Mucous membranes moist, no apparent injury, no lesions seen. [place].   HEAD/NECK: No lymphadenopathy, thyromegaly or JVD.  HEART: Regular rate & rhythm, no murmurs, 2+ equal pulses of the extremities, Normal S1 & S2.  LUNGS: Patent airways, CTAB, normal breath sounds with good chest expansion, thorax symmetric  ABDOMEN: Nondistended, soft, slightly tender, +BS, no rebound tenderness or guarding.   EXTREMITIES: Normal extremities, no cyanosis, trace ankle edema, no contusions, no clubbing  NEUROLOGIC: intact senses, motor, response and reflexes ok, weakness    Results/Data:   Lab Results   Component Value Date    WBC 5.8 2023    HGB 13.2 2023    HCT 41.1 2023     2023    CHOL 163 2020    TRIG 112 2020    HDL 50 (L)  12/16/2020    ALT 6 (L) 08/16/2023    AST 21 08/16/2023     08/16/2023    K 4.3 08/16/2023     08/16/2023    CREATININE 1.19 (H) 08/16/2023    BUN 20 08/16/2023    CO2 26 08/16/2023    TSH 0.71 12/16/2020    INR 1.3 (H) 08/16/2023    HGBA1C 6.0 02/14/2021     Provider Impression:   Dementia with Behavioral Disturbances, Depression, Anxiety  - remain on secure behavioral unit  - continue with fluvoxamine, buspar  - fall precautions  - monitor behaviors  - consult psych as needed    UTI  - c/w keflex 500mg TID x7d  - denies dysuria    Parkinson's Disease  - continue with carbidopa-levodopa at scheduled times  - monitor    CV- Afib, HTN, CHF  - continue with Eliqus for stroke prevention  - continue with digoxin for rate control  - monitor level PRN    Recurrent UTI's  - continue with suppressive atb with keflex  - continue with cranberry capsules    Constipation Prevention  - c/w stool softeners and laxatives PRN  - having regular bowel movements    Hypothyroidism  - continue with synthroid  - monitor level PRN    GERD  - continue with pepcid    Vitamin D + B12 deficiency  - continue with supplements    Insomnia  - continue with melatonin    Code Status  - [facility]  ----------------  Written by Balbina Mclaughlin RN, acting as a scribe for Dr. Corrales. This note accurately reflects the work and decisions made by Dr. Corrales.     I, Dr. Corrales, attest all medical record entries made by the scribe were under my direction and were personally dictated by me. I have reviewed the chart and agree that the record accurately reflects my performance of the history, physical exam, and assessment and plan.

## 2024-03-09 ENCOUNTER — NURSING HOME VISIT (OUTPATIENT)
Dept: POST ACUTE CARE | Facility: EXTERNAL LOCATION | Age: 89
End: 2024-03-09
Payer: MEDICARE

## 2024-03-09 DIAGNOSIS — I48.91 ATRIAL FIBRILLATION, UNSPECIFIED TYPE (MULTI): ICD-10-CM

## 2024-03-09 DIAGNOSIS — F41.8 DEPRESSION WITH ANXIETY: ICD-10-CM

## 2024-03-09 DIAGNOSIS — E03.9 HYPOTHYROIDISM, UNSPECIFIED TYPE: ICD-10-CM

## 2024-03-09 DIAGNOSIS — F03.C3 SEVERE DEMENTIA WITH MOOD DISTURBANCE, UNSPECIFIED DEMENTIA TYPE (MULTI): ICD-10-CM

## 2024-03-09 DIAGNOSIS — G20.A1 PARKINSON'S DISEASE, UNSPECIFIED WHETHER DYSKINESIA PRESENT, UNSPECIFIED WHETHER MANIFESTATIONS FLUCTUATE (MULTI): ICD-10-CM

## 2024-03-09 DIAGNOSIS — K21.9 GASTROESOPHAGEAL REFLUX DISEASE, UNSPECIFIED WHETHER ESOPHAGITIS PRESENT: ICD-10-CM

## 2024-03-09 DIAGNOSIS — I15.9 SECONDARY HYPERTENSION: ICD-10-CM

## 2024-03-09 DIAGNOSIS — E56.9 VITAMIN DEFICIENCY: ICD-10-CM

## 2024-03-09 PROCEDURE — 99308 SBSQ NF CARE LOW MDM 20: CPT | Performed by: INTERNAL MEDICINE

## 2024-03-09 NOTE — LETTER
Patient: Shanon Smith  : 8/3/1931    Encounter Date: 2024    Name: Shanon Smith  : 8/3/1931  MRN: [id number]  Visit Date: 3/9/2024  Chief Complaint: Monthly visit for management of chronic disease    HPI: 89 y/o CF who was admitted to [facility] on  from home with family for long term care due to her progressive dementia with behavioral disturbances. Patient will be admitted to the secure behavioral care unit at [facility].    Subjective: Seen and examined today. Sitting up in wheelchair in common area. Pleasantly confused on exam. Offers no complaints. Nursing reports no issues. I have reviewed nursing notes since my last visit and document any significant changes Reviewed orders, medications, Labs. Reviewed and updated Interval hx and meds reviewed. Reviewed chart looking at current medications, treatments, labs, x-rays etc.     ROS:  As above in subjective. Otherwise, all other systems have been reviewed and are negative for complaint.    Medications:  Current Outpatient Medications   Medication Instructions   • acetaminophen (TYLENOL) 650 mg, oral, Every 6 hours PRN   • alum-mag hydroxide-simeth (Maalox MAX) 400-400-40 mg/5 mL suspension 10 mL, oral, Nightly   • apixaban (ELIQUIS) 2.5 mg, oral, 2 times daily   • busPIRone (BUSPAR) 5 mg, oral, 2 times daily   • carbidopa-levodopa (Sinemet CR)  mg ER tablet 2 tablets, oral, 3 times daily, Do not crush, chew, or split.   • cephalexin (KEFLEX) 250 mg, oral, Daily, Ppx atb   • cholecalciferol, vitamin D3, 25 mcg (1,000 unit) tablet,chewable 2 tablets, oral, Daily   • cranberry extract 425 mg capsule 1 capsule, oral, Daily   • cyanocobalamin (VITAMIN B-12) 500 mcg, oral, Daily   • digoxin (Lanoxin) 125 MCG tablet 1 tablet, oral, 3 times weekly, On Mon / Wed / Fri.   • famotidine (PEPCID) 20 mg, oral, Daily   • folic acid (FOLVITE) 1 mg, oral, Daily   • ipratropium-albuteroL (Duo-Neb) 0.5-2.5 mg/3 mL nebulizer solution 3 mL, inhalation,  Every 6 hours PRN   • Lactobacillus acidophilus 100 mg (1 billion cell) capsule 1 capsule, oral, Daily   • levothyroxine (TIROSINT) 75 mcg, oral, Daily before breakfast   • loratadine (CLARITIN) 10 mg, oral, Daily   • melatonin 5 mg, oral, Nightly PRN   • white petrolatum-mineral oiL (Tears Naturale PM) 94-3 % ointment ophthalmic ointment 1 Application, Both Eyes, 3 times daily PRN      Visit Vitals  Smoking Status Never      Physical Exam:  CONSTITUTIONAL: alert and oriented to self, confusion, no distress, cooperative, guarded  SKIN: Warm & Dry, no lesions, no rashes.  EYES: EOMI, clear sclera  ENMT: Mucous membranes moist, no apparent injury, no lesions seen. [place].   HEAD/NECK: No lymphadenopathy, thyromegaly or JVD.  HEART: Regular rate & rhythm, no murmurs, 2+ equal pulses of the extremities, Normal S1 & S2.  LUNGS: Patent airways, CTAB, normal breath sounds with good chest expansion, thorax symmetric  ABDOMEN: Nondistended, soft, slightly tender, +BS, no rebound tenderness or guarding.   EXTREMITIES: Normal extremities, no cyanosis, trace ankle edema, no contusions, no clubbing  NEUROLOGIC: intact senses, motor, response and reflexes ok, weakness    Results/Data:   Lab Results   Component Value Date    WBC 5.8 08/16/2023    HGB 13.2 08/16/2023    HCT 41.1 08/16/2023     08/16/2023    CHOL 163 12/16/2020    TRIG 112 12/16/2020    HDL 50 (L) 12/16/2020    ALT 6 (L) 08/16/2023    AST 21 08/16/2023     08/16/2023    K 4.3 08/16/2023     08/16/2023    CREATININE 1.19 (H) 08/16/2023    BUN 20 08/16/2023    CO2 26 08/16/2023    TSH 0.71 12/16/2020    INR 1.3 (H) 08/16/2023    HGBA1C 6.0 02/14/2021     Provider Impression:   Dementia with Behavioral Disturbances, Depression, Anxiety  - remain on secure behavioral unit  - continue with fluvoxamine, buspar  - fall precautions  - monitor behaviors  - consult psych as needed    Parkinson's Disease  - continue with carbidopa-levodopa at scheduled times  -  monitor    CV- Afib, HTN, CHF  - continue with Eliqus for stroke prevention  - continue with digoxin for rate control  - monitor level PRN    Recurrent UTI's  - continue with suppressive atb with keflex  - continue with cranberry capsules    Constipation Prevention  - c/w stool softeners and laxatives PRN  - having regular bowel movements    Hypothyroidism  - continue with synthroid  - monitor level PRN    GERD  - continue with pepcid    Vitamin D + B12 deficiency  - continue with supplements    Insomnia  - continue with melatonin    Code Status  - DNC  ----------------  Written by Balbina Mclaughlin RN, acting as a scribe for Dr. Corrales. This note accurately reflects the work and decisions made by Dr. Corrales.     I, Dr. Corrales, attest all medical record entries made by the scribe were under my direction and were personally dictated by me. I have reviewed the chart and agree that the record accurately reflects my performance of the history, physical exam, and assessment and plan.       Electronically Signed By: Noni Land MD   6/11/24  2:31 PM

## 2024-03-11 VITALS
BODY MASS INDEX: 30.88 KG/M2 | RESPIRATION RATE: 18 BRPM | DIASTOLIC BLOOD PRESSURE: 60 MMHG | WEIGHT: 152.9 LBS | SYSTOLIC BLOOD PRESSURE: 118 MMHG | TEMPERATURE: 98.1 F | OXYGEN SATURATION: 94 % | HEART RATE: 75 BPM

## 2024-03-11 PROBLEM — H90.3 ASYMMETRIC SNHL (SENSORINEURAL HEARING LOSS): Status: RESOLVED | Noted: 2024-03-11 | Resolved: 2024-03-11

## 2024-03-11 PROBLEM — G89.29 CHRONIC BACK PAIN: Status: ACTIVE | Noted: 2024-03-11

## 2024-03-11 PROBLEM — M81.0 AGE-RELATED OSTEOPOROSIS WITHOUT CURRENT PATHOLOGICAL FRACTURE: Status: ACTIVE | Noted: 2024-03-11
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
## 2024-03-11 PROBLEM — G20.A1 DEMENTIA DUE TO PARKINSON'S DISEASE WITHOUT BEHAVIORAL DISTURBANCE (MULTI): Status: ACTIVE | Noted: 2024-03-11

## 2024-03-11 PROBLEM — K52.9 INFLAMMATORY BOWEL DISEASE: Status: RESOLVED | Noted: 2024-03-11 | Resolved: 2024-03-11

## 2024-03-11 PROBLEM — M17.12 ARTHRITIS OF KNEE, LEFT: Status: RESOLVED | Noted: 2024-03-11 | Resolved: 2024-03-11

## 2024-03-11 PROBLEM — Z86.0100 HISTORY OF COLONIC POLYPS: Status: RESOLVED | Noted: 2024-03-11 | Resolved: 2024-03-11

## 2024-03-11 PROBLEM — E53.8 VITAMIN B12 DEFICIENCY: Status: ACTIVE | Noted: 2024-03-11

## 2024-03-11 PROBLEM — G25.81 RLS (RESTLESS LEGS SYNDROME): Status: ACTIVE | Noted: 2024-03-11

## 2024-03-11 PROBLEM — R40.1 CLOUDED CONSCIOUSNESS: Status: RESOLVED | Noted: 2024-03-11 | Resolved: 2024-03-11

## 2024-03-11 PROBLEM — D61.818 PANCYTOPENIA (MULTI): Status: RESOLVED | Noted: 2024-03-11 | Resolved: 2024-03-11

## 2024-03-11 PROBLEM — Z86.010 HISTORY OF COLONIC POLYPS: Status: RESOLVED | Noted: 2024-03-11 | Resolved: 2024-03-11

## 2024-03-11 PROBLEM — K62.5 RECTAL HEMORRHAGE: Status: RESOLVED | Noted: 2024-03-11 | Resolved: 2024-03-11

## 2024-03-11 PROBLEM — E78.5 HYPERLIPIDEMIA: Status: RESOLVED | Noted: 2024-03-11 | Resolved: 2024-03-11

## 2024-03-11 PROBLEM — I50.42 CHRONIC COMBINED SYSTOLIC AND DIASTOLIC CONGESTIVE HEART FAILURE (MULTI): Status: ACTIVE | Noted: 2024-03-11

## 2024-03-11 PROBLEM — F02.80 DEMENTIA DUE TO PARKINSON'S DISEASE WITHOUT BEHAVIORAL DISTURBANCE (MULTI): Status: ACTIVE | Noted: 2024-03-11

## 2024-03-11 PROBLEM — F02.80 LATE ONSET ALZHEIMER'S DEMENTIA WITHOUT BEHAVIORAL DISTURBANCE (MULTI): Status: ACTIVE | Noted: 2023-05-10

## 2024-03-11 PROBLEM — K62.3 RECTAL PROLAPSE: Status: RESOLVED | Noted: 2024-03-11 | Resolved: 2024-03-11

## 2024-03-11 PROBLEM — R29.6 RECURRENT FALLS: Status: RESOLVED | Noted: 2020-11-14 | Resolved: 2024-03-11

## 2024-03-11 PROBLEM — M54.9 CHRONIC BACK PAIN: Status: ACTIVE | Noted: 2024-03-11

## 2024-03-11 PROBLEM — D69.6 THROMBOCYTOPENIA (CMS-HCC): Status: RESOLVED | Noted: 2024-03-11 | Resolved: 2024-03-11

## 2024-03-11 PROBLEM — M15.0 PRIMARY OSTEOARTHRITIS INVOLVING MULTIPLE JOINTS: Status: RESOLVED | Noted: 2024-03-11 | Resolved: 2024-03-11

## 2024-03-11 PROBLEM — G30.1 LATE ONSET ALZHEIMER'S DEMENTIA WITHOUT BEHAVIORAL DISTURBANCE (MULTI): Status: ACTIVE | Noted: 2023-05-10

## 2024-03-11 PROBLEM — M15.9 PRIMARY OSTEOARTHRITIS INVOLVING MULTIPLE JOINTS: Status: RESOLVED | Noted: 2024-03-11 | Resolved: 2024-03-11

## 2024-03-11 PROBLEM — K57.90 DIVERTICULOSIS: Status: RESOLVED | Noted: 2024-03-11 | Resolved: 2024-03-11

## 2024-03-11 RX ORDER — LEVOTHYROXINE SODIUM 75 UG/1
75 CAPSULE ORAL
COMMUNITY

## 2024-03-11 RX ORDER — LORATADINE 10 MG/1
10 TABLET ORAL DAILY
COMMUNITY

## 2024-03-11 RX ORDER — CEPHALEXIN 250 MG/1
250 CAPSULE ORAL DAILY
COMMUNITY
Start: 2023-07-06

## 2024-03-11 RX ORDER — FAMOTIDINE 20 MG/1
20 TABLET, FILM COATED ORAL DAILY
COMMUNITY

## 2024-03-11 RX ORDER — CARBIDOPA AND LEVODOPA 25; 100 MG/1; MG/1
2 TABLET, EXTENDED RELEASE ORAL 3 TIMES DAILY
COMMUNITY

## 2024-03-11 RX ORDER — IPRATROPIUM BROMIDE AND ALBUTEROL SULFATE 2.5; .5 MG/3ML; MG/3ML
3 SOLUTION RESPIRATORY (INHALATION) EVERY 6 HOURS PRN
COMMUNITY

## 2024-03-11 RX ORDER — ACETAMINOPHEN 500 MG
5 TABLET ORAL NIGHTLY PRN
COMMUNITY

## 2024-03-11 RX ORDER — DIGOXIN 125 MCG
1 TABLET ORAL 3 TIMES WEEKLY
COMMUNITY
Start: 2015-05-06

## 2024-03-11 RX ORDER — FOLIC ACID 1 MG/1
1 TABLET ORAL DAILY
COMMUNITY
Start: 2018-12-21

## 2024-03-11 RX ORDER — BUSPIRONE HYDROCHLORIDE 5 MG/1
5 TABLET ORAL 2 TIMES DAILY
COMMUNITY

## 2024-03-11 RX ORDER — ASCORBIC ACID 125 MG
2 TABLET,CHEWABLE ORAL DAILY
COMMUNITY

## 2024-03-11 RX ORDER — BLACK COHOSH ROOT EXTRACT 80 MG
1 CAPSULE ORAL DAILY
COMMUNITY

## 2024-03-11 RX ORDER — ACETAMINOPHEN 325 MG/1
650 TABLET ORAL EVERY 6 HOURS PRN
COMMUNITY
Start: 2020-11-17

## 2024-03-11 RX ORDER — UBIDECARENONE 75 MG
500 CAPSULE ORAL DAILY
COMMUNITY

## 2024-03-11 RX ORDER — ALUMINUM HYDROXIDE, MAGNESIUM HYDROXIDE, AND SIMETHICONE 2400; 240; 2400 MG/30ML; MG/30ML; MG/30ML
10 SUSPENSION ORAL NIGHTLY
COMMUNITY

## 2024-03-11 RX ORDER — DIMETHICONE 13 MG/ML
1 LOTION TOPICAL DAILY
COMMUNITY

## 2024-03-11 NOTE — PROGRESS NOTES
Name: Shanon Smith  : 8/3/1931  MRN: [id number]  Visit Date: 2024  Chief Complaint: Monthly visit for management of chronic disease    HPI: 89 y/o CF who was admitted to [facility] on  from home with family for long term care due to her progressive dementia with behavioral disturbances. Patient will be admitted to the secure behavioral care unit at [facility].    Subjective: Seen and examined today. Sitting up in wheelchair in common area. Pleasantly confused on exam. Offers no complaints. Nursing reports no issues. I have reviewed nursing notes since my last visit and document any significant changes Reviewed orders, medications, Labs. Reviewed and updated Interval hx and meds reviewed. Reviewed chart looking at current medications, treatments, labs, x-rays etc.     ROS:  As above in subjective. Otherwise, all other systems have been reviewed and are negative for complaint.    Medications:  Current Outpatient Medications   Medication Instructions    acetaminophen (TYLENOL) 650 mg, oral, Every 6 hours PRN    alum-mag hydroxide-simeth (Maalox MAX) 400-400-40 mg/5 mL suspension 10 mL, oral, Nightly    apixaban (ELIQUIS) 2.5 mg, oral, 2 times daily    busPIRone (BUSPAR) 5 mg, oral, 2 times daily    carbidopa-levodopa (Sinemet CR)  mg ER tablet 2 tablets, oral, 3 times daily, Do not crush, chew, or split.    cephalexin (KEFLEX) 250 mg, oral, Daily, Ppx atb    cholecalciferol, vitamin D3, 25 mcg (1,000 unit) tablet,chewable 2 tablets, oral, Daily    cranberry extract 425 mg capsule 1 capsule, oral, Daily    cyanocobalamin (VITAMIN B-12) 500 mcg, oral, Daily    digoxin (Lanoxin) 125 MCG tablet 1 tablet, oral, 3 times weekly, On Mon / Wed / Fri.    famotidine (PEPCID) 20 mg, oral, Daily    folic acid (FOLVITE) 1 mg, oral, Daily    ipratropium-albuteroL (Duo-Neb) 0.5-2.5 mg/3 mL nebulizer solution 3 mL, inhalation, Every 6 hours PRN    Lactobacillus acidophilus 100 mg (1 billion cell) capsule 1  capsule, oral, Daily    levothyroxine (TIROSINT) 75 mcg, oral, Daily before breakfast    loratadine (CLARITIN) 10 mg, oral, Daily    melatonin 5 mg, oral, Nightly PRN    white petrolatum-mineral oiL (Tears Naturale PM) 94-3 % ointment ophthalmic ointment 1 Application, Both Eyes, 3 times daily PRN      Visit Vitals  /60   Pulse 75   Temp 36.7 °C (98.1 °F)   Resp 18   Wt 69.4 kg (152 lb 14.4 oz)   SpO2 94%   BMI 30.88 kg/m²   Smoking Status Never   BSA 1.7 m²      Physical Exam:  CONSTITUTIONAL: alert and oriented to self, confusion, no distress, cooperative, guarded  SKIN: Warm & Dry, no lesions, no rashes.  EYES: EOMI, clear sclera  ENMT: Mucous membranes moist, no apparent injury, no lesions seen. [place].   HEAD/NECK: No lymphadenopathy, thyromegaly or JVD.  HEART: Regular rate & rhythm, no murmurs, 2+ equal pulses of the extremities, Normal S1 & S2.  LUNGS: Patent airways, CTAB, normal breath sounds with good chest expansion, thorax symmetric  ABDOMEN: Nondistended, soft, slightly tender, +BS, no rebound tenderness or guarding.   EXTREMITIES: Normal extremities, no cyanosis, trace ankle edema, no contusions, no clubbing  NEUROLOGIC: intact senses, motor, response and reflexes ok, weakness    Results/Data:   Lab Results   Component Value Date    WBC 5.8 08/16/2023    HGB 13.2 08/16/2023    HCT 41.1 08/16/2023     08/16/2023    CHOL 163 12/16/2020    TRIG 112 12/16/2020    HDL 50 (L) 12/16/2020    ALT 6 (L) 08/16/2023    AST 21 08/16/2023     08/16/2023    K 4.3 08/16/2023     08/16/2023    CREATININE 1.19 (H) 08/16/2023    BUN 20 08/16/2023    CO2 26 08/16/2023    TSH 0.71 12/16/2020    INR 1.3 (H) 08/16/2023    HGBA1C 6.0 02/14/2021     Provider Impression:   Dementia with Behavioral Disturbances, Depression, Anxiety  - remain on secure behavioral unit  - continue with fluvoxamine, buspar  - fall precautions  - monitor behaviors  - consult psych as needed    Parkinson's Disease  - continue  with carbidopa-levodopa at scheduled times  - monitor    CV- Afib, HTN, CHF  - continue with Eliqus for stroke prevention  - continue with digoxin for rate control  - monitor level PRN    Recurrent UTI's  - continue with suppressive atb with keflex  - continue with cranberry capsules    Constipation Prevention  - c/w stool softeners and laxatives PRN  - having regular bowel movements    Hypothyroidism  - continue with synthroid  - monitor level PRN    GERD  - continue with pepcid    Vitamin D + B12 deficiency  - continue with supplements    Insomnia  - continue with melatonin    Code Status  - [facility]  ----------------  Written by Balbina Mclaughlin RN, acting as a scribe for Dr. Corrales. This note accurately reflects the work and decisions made by Dr. Corrales.     I, Dr. Corrales, attest all medical record entries made by the scribe were under my direction and were personally dictated by me. I have reviewed the chart and agree that the record accurately reflects my performance of the history, physical exam, and assessment and plan.

## 2024-05-02 ENCOUNTER — NURSING HOME VISIT (OUTPATIENT)
Dept: POST ACUTE CARE | Facility: EXTERNAL LOCATION | Age: 89
End: 2024-05-02
Payer: MEDICARE

## 2024-05-02 DIAGNOSIS — E03.9 HYPOTHYROIDISM, UNSPECIFIED TYPE: ICD-10-CM

## 2024-05-02 DIAGNOSIS — I15.9 SECONDARY HYPERTENSION: ICD-10-CM

## 2024-05-02 DIAGNOSIS — F41.8 DEPRESSION WITH ANXIETY: ICD-10-CM

## 2024-05-02 DIAGNOSIS — K21.9 GASTROESOPHAGEAL REFLUX DISEASE, UNSPECIFIED WHETHER ESOPHAGITIS PRESENT: ICD-10-CM

## 2024-05-02 DIAGNOSIS — I48.91 ATRIAL FIBRILLATION, UNSPECIFIED TYPE (MULTI): ICD-10-CM

## 2024-05-02 DIAGNOSIS — G20.A1 PARKINSON'S DISEASE, UNSPECIFIED WHETHER DYSKINESIA PRESENT, UNSPECIFIED WHETHER MANIFESTATIONS FLUCTUATE (MULTI): ICD-10-CM

## 2024-05-02 DIAGNOSIS — F03.C3 SEVERE DEMENTIA WITH MOOD DISTURBANCE, UNSPECIFIED DEMENTIA TYPE (MULTI): ICD-10-CM

## 2024-05-02 DIAGNOSIS — E56.9 VITAMIN DEFICIENCY: ICD-10-CM

## 2024-05-02 PROCEDURE — 99308 SBSQ NF CARE LOW MDM 20: CPT | Performed by: INTERNAL MEDICINE

## 2024-05-02 NOTE — LETTER
Patient: Shanon Smith  : 8/3/1931    Encounter Date: 2024    Name: Shanon Smith  : 8/3/1931  MRN: [id number]  Visit Date: 2024  Chief Complaint: Monthly visit for management of chronic disease    HPI: 89 y/o CF who was admitted to [facility] on  from home with family for long term care due to her progressive dementia with behavioral disturbances. Patient will be admitted to the secure behavioral care unit at [facility].    Subjective: Seen and examined today. Sitting up in wheelchair in common area. Pleasantly confused on exam. Offers no complaints. Nursing reports no issues. I have reviewed nursing notes since my last visit and document any significant changes Reviewed orders, medications, Labs. Reviewed and updated Interval hx and meds reviewed. Reviewed chart looking at current medications, treatments, labs, x-rays etc.     ROS:  As above in subjective. Otherwise, all other systems have been reviewed and are negative for complaint.    Medications:  Current Outpatient Medications   Medication Instructions   • acetaminophen (TYLENOL) 650 mg, oral, Every 6 hours PRN   • alum-mag hydroxide-simeth (Maalox MAX) 400-400-40 mg/5 mL suspension 10 mL, oral, Nightly   • apixaban (ELIQUIS) 2.5 mg, oral, 2 times daily   • busPIRone (BUSPAR) 5 mg, oral, 2 times daily   • carbidopa-levodopa (Sinemet CR)  mg ER tablet 2 tablets, oral, 3 times daily, Do not crush, chew, or split.   • cephalexin (KEFLEX) 250 mg, oral, Daily, Ppx atb   • cholecalciferol, vitamin D3, 25 mcg (1,000 unit) tablet,chewable 2 tablets, oral, Daily   • cranberry extract 425 mg capsule 1 capsule, oral, Daily   • cyanocobalamin (VITAMIN B-12) 500 mcg, oral, Daily   • digoxin (Lanoxin) 125 MCG tablet 1 tablet, oral, 3 times weekly, On Mon / Wed / Fri.   • famotidine (PEPCID) 20 mg, oral, Daily   • folic acid (FOLVITE) 1 mg, oral, Daily   • ipratropium-albuteroL (Duo-Neb) 0.5-2.5 mg/3 mL nebulizer solution 3 mL, inhalation,  Every 6 hours PRN   • Lactobacillus acidophilus 100 mg (1 billion cell) capsule 1 capsule, oral, Daily   • levothyroxine (TIROSINT) 75 mcg, oral, Daily before breakfast   • loratadine (CLARITIN) 10 mg, oral, Daily   • melatonin 5 mg, oral, Nightly PRN   • white petrolatum-mineral oiL (Tears Naturale PM) 94-3 % ointment ophthalmic ointment 1 Application, Both Eyes, 3 times daily PRN      Visit Vitals  Smoking Status Never      Physical Exam:  CONSTITUTIONAL: alert and oriented to self, confusion, no distress, cooperative, guarded  SKIN: Warm & Dry, no lesions, no rashes.  EYES: EOMI, clear sclera  ENMT: Mucous membranes moist, no apparent injury, no lesions seen. [place].   HEAD/NECK: No lymphadenopathy, thyromegaly or JVD.  HEART: Regular rate & rhythm, no murmurs, 2+ equal pulses of the extremities, Normal S1 & S2.  LUNGS: Patent airways, CTAB, normal breath sounds with good chest expansion, thorax symmetric  ABDOMEN: Nondistended, soft, slightly tender, +BS, no rebound tenderness or guarding.   EXTREMITIES: Normal extremities, no cyanosis, trace ankle edema, no contusions, no clubbing  NEUROLOGIC: intact senses, motor, response and reflexes ok, weakness    Results/Data:   Lab Results   Component Value Date    WBC 5.8 08/16/2023    HGB 13.2 08/16/2023    HCT 41.1 08/16/2023     08/16/2023    CHOL 163 12/16/2020    TRIG 112 12/16/2020    HDL 50 (L) 12/16/2020    ALT 6 (L) 08/16/2023    AST 21 08/16/2023     08/16/2023    K 4.3 08/16/2023     08/16/2023    CREATININE 1.19 (H) 08/16/2023    BUN 20 08/16/2023    CO2 26 08/16/2023    TSH 0.71 12/16/2020    INR 1.3 (H) 08/16/2023    HGBA1C 6.0 02/14/2021     Provider Impression:   Dementia with Behavioral Disturbances, Depression, Anxiety  - remain on secure behavioral unit  - continue with fluvoxamine, buspar  - fall precautions  - monitor behaviors  - consult psych as needed    Parkinson's Disease  - continue with carbidopa-levodopa at scheduled times  -  monitor    CV- Afib, HTN, CHF  - continue with Eliqus for stroke prevention  - continue with digoxin for rate control  - monitor level PRN    Recurrent UTI's  - continue with suppressive atb with keflex  - continue with cranberry capsules    Constipation Prevention  - c/w stool softeners and laxatives PRN  - having regular bowel movements    Hypothyroidism  - continue with synthroid  - monitor level PRN    GERD  - continue with pepcid    Vitamin D + B12 deficiency  - continue with supplements    Insomnia  - continue with melatonin    Code Status  - DNC  ----------------  Written by Balbina Mclaughlin RN, acting as a scribe for Dr. Corrales. This note accurately reflects the work and decisions made by Dr. Corrales.     I, Dr. Corrales, attest all medical record entries made by the scribe were under my direction and were personally dictated by me. I have reviewed the chart and agree that the record accurately reflects my performance of the history, physical exam, and assessment and plan.     Electronically Signed By: Noni Land MD   6/11/24  2:31 PM

## 2024-06-10 NOTE — PROGRESS NOTES
Name: Shanon Smith  : 8/3/1931  MRN: [id number]  Visit Date: 3/9/2024  Chief Complaint: Monthly visit for management of chronic disease    HPI: 89 y/o CF who was admitted to [facility] on  from home with family for long term care due to her progressive dementia with behavioral disturbances. Patient will be admitted to the secure behavioral care unit at [facility].    Subjective: Seen and examined today. Sitting up in wheelchair in common area. Pleasantly confused on exam. Offers no complaints. Nursing reports no issues. I have reviewed nursing notes since my last visit and document any significant changes Reviewed orders, medications, Labs. Reviewed and updated Interval hx and meds reviewed. Reviewed chart looking at current medications, treatments, labs, x-rays etc.     ROS:  As above in subjective. Otherwise, all other systems have been reviewed and are negative for complaint.    Medications:  Current Outpatient Medications   Medication Instructions    acetaminophen (TYLENOL) 650 mg, oral, Every 6 hours PRN    alum-mag hydroxide-simeth (Maalox MAX) 400-400-40 mg/5 mL suspension 10 mL, oral, Nightly    apixaban (ELIQUIS) 2.5 mg, oral, 2 times daily    busPIRone (BUSPAR) 5 mg, oral, 2 times daily    carbidopa-levodopa (Sinemet CR)  mg ER tablet 2 tablets, oral, 3 times daily, Do not crush, chew, or split.    cephalexin (KEFLEX) 250 mg, oral, Daily, Ppx atb    cholecalciferol, vitamin D3, 25 mcg (1,000 unit) tablet,chewable 2 tablets, oral, Daily    cranberry extract 425 mg capsule 1 capsule, oral, Daily    cyanocobalamin (VITAMIN B-12) 500 mcg, oral, Daily    digoxin (Lanoxin) 125 MCG tablet 1 tablet, oral, 3 times weekly, On Mon / Wed / Fri.    famotidine (PEPCID) 20 mg, oral, Daily    folic acid (FOLVITE) 1 mg, oral, Daily    ipratropium-albuteroL (Duo-Neb) 0.5-2.5 mg/3 mL nebulizer solution 3 mL, inhalation, Every 6 hours PRN    Lactobacillus acidophilus 100 mg (1 billion cell) capsule 1  capsule, oral, Daily    levothyroxine (TIROSINT) 75 mcg, oral, Daily before breakfast    loratadine (CLARITIN) 10 mg, oral, Daily    melatonin 5 mg, oral, Nightly PRN    white petrolatum-mineral oiL (Tears Naturale PM) 94-3 % ointment ophthalmic ointment 1 Application, Both Eyes, 3 times daily PRN      Visit Vitals  Smoking Status Never      Physical Exam:  CONSTITUTIONAL: alert and oriented to self, confusion, no distress, cooperative, guarded  SKIN: Warm & Dry, no lesions, no rashes.  EYES: EOMI, clear sclera  ENMT: Mucous membranes moist, no apparent injury, no lesions seen. [place].   HEAD/NECK: No lymphadenopathy, thyromegaly or JVD.  HEART: Regular rate & rhythm, no murmurs, 2+ equal pulses of the extremities, Normal S1 & S2.  LUNGS: Patent airways, CTAB, normal breath sounds with good chest expansion, thorax symmetric  ABDOMEN: Nondistended, soft, slightly tender, +BS, no rebound tenderness or guarding.   EXTREMITIES: Normal extremities, no cyanosis, trace ankle edema, no contusions, no clubbing  NEUROLOGIC: intact senses, motor, response and reflexes ok, weakness    Results/Data:   Lab Results   Component Value Date    WBC 5.8 08/16/2023    HGB 13.2 08/16/2023    HCT 41.1 08/16/2023     08/16/2023    CHOL 163 12/16/2020    TRIG 112 12/16/2020    HDL 50 (L) 12/16/2020    ALT 6 (L) 08/16/2023    AST 21 08/16/2023     08/16/2023    K 4.3 08/16/2023     08/16/2023    CREATININE 1.19 (H) 08/16/2023    BUN 20 08/16/2023    CO2 26 08/16/2023    TSH 0.71 12/16/2020    INR 1.3 (H) 08/16/2023    HGBA1C 6.0 02/14/2021     Provider Impression:   Dementia with Behavioral Disturbances, Depression, Anxiety  - remain on secure behavioral unit  - continue with fluvoxamine, buspar  - fall precautions  - monitor behaviors  - consult psych as needed    Parkinson's Disease  - continue with carbidopa-levodopa at scheduled times  - monitor    CV- Afib, HTN, CHF  - continue with Eliqus for stroke prevention  -  continue with digoxin for rate control  - monitor level PRN    Recurrent UTI's  - continue with suppressive atb with keflex  - continue with cranberry capsules    Constipation Prevention  - c/w stool softeners and laxatives PRN  - having regular bowel movements    Hypothyroidism  - continue with synthroid  - monitor level PRN    GERD  - continue with pepcid    Vitamin D + B12 deficiency  - continue with supplements    Insomnia  - continue with melatonin    Code Status  - [facility]  ----------------  Written by Balbina Mclaughlin RN, acting as a scribe for Dr. Corrales. This note accurately reflects the work and decisions made by Dr. Corrales.     I, Dr. Corrales, attest all medical record entries made by the scribe were under my direction and were personally dictated by me. I have reviewed the chart and agree that the record accurately reflects my performance of the history, physical exam, and assessment and plan.

## 2024-06-10 NOTE — PROGRESS NOTES
Name: Shanon Smith  : 8/3/1931  MRN: [id number]  Visit Date: 2024  Chief Complaint: Monthly visit for management of chronic disease    HPI: 89 y/o CF who was admitted to [facility] on  from home with family for long term care due to her progressive dementia with behavioral disturbances. Patient will be admitted to the secure behavioral care unit at [facility].    Subjective: Seen and examined today. Sitting up in wheelchair in common area. Pleasantly confused on exam. Offers no complaints. Nursing reports no issues. I have reviewed nursing notes since my last visit and document any significant changes Reviewed orders, medications, Labs. Reviewed and updated Interval hx and meds reviewed. Reviewed chart looking at current medications, treatments, labs, x-rays etc.     ROS:  As above in subjective. Otherwise, all other systems have been reviewed and are negative for complaint.    Medications:  Current Outpatient Medications   Medication Instructions    acetaminophen (TYLENOL) 650 mg, oral, Every 6 hours PRN    alum-mag hydroxide-simeth (Maalox MAX) 400-400-40 mg/5 mL suspension 10 mL, oral, Nightly    apixaban (ELIQUIS) 2.5 mg, oral, 2 times daily    busPIRone (BUSPAR) 5 mg, oral, 2 times daily    carbidopa-levodopa (Sinemet CR)  mg ER tablet 2 tablets, oral, 3 times daily, Do not crush, chew, or split.    cephalexin (KEFLEX) 250 mg, oral, Daily, Ppx atb    cholecalciferol, vitamin D3, 25 mcg (1,000 unit) tablet,chewable 2 tablets, oral, Daily    cranberry extract 425 mg capsule 1 capsule, oral, Daily    cyanocobalamin (VITAMIN B-12) 500 mcg, oral, Daily    digoxin (Lanoxin) 125 MCG tablet 1 tablet, oral, 3 times weekly, On Mon / Wed / Fri.    famotidine (PEPCID) 20 mg, oral, Daily    folic acid (FOLVITE) 1 mg, oral, Daily    ipratropium-albuteroL (Duo-Neb) 0.5-2.5 mg/3 mL nebulizer solution 3 mL, inhalation, Every 6 hours PRN    Lactobacillus acidophilus 100 mg (1 billion cell) capsule 1  capsule, oral, Daily    levothyroxine (TIROSINT) 75 mcg, oral, Daily before breakfast    loratadine (CLARITIN) 10 mg, oral, Daily    melatonin 5 mg, oral, Nightly PRN    white petrolatum-mineral oiL (Tears Naturale PM) 94-3 % ointment ophthalmic ointment 1 Application, Both Eyes, 3 times daily PRN      Visit Vitals  Smoking Status Never      Physical Exam:  CONSTITUTIONAL: alert and oriented to self, confusion, no distress, cooperative, guarded  SKIN: Warm & Dry, no lesions, no rashes.  EYES: EOMI, clear sclera  ENMT: Mucous membranes moist, no apparent injury, no lesions seen. [place].   HEAD/NECK: No lymphadenopathy, thyromegaly or JVD.  HEART: Regular rate & rhythm, no murmurs, 2+ equal pulses of the extremities, Normal S1 & S2.  LUNGS: Patent airways, CTAB, normal breath sounds with good chest expansion, thorax symmetric  ABDOMEN: Nondistended, soft, slightly tender, +BS, no rebound tenderness or guarding.   EXTREMITIES: Normal extremities, no cyanosis, trace ankle edema, no contusions, no clubbing  NEUROLOGIC: intact senses, motor, response and reflexes ok, weakness    Results/Data:   Lab Results   Component Value Date    WBC 5.8 08/16/2023    HGB 13.2 08/16/2023    HCT 41.1 08/16/2023     08/16/2023    CHOL 163 12/16/2020    TRIG 112 12/16/2020    HDL 50 (L) 12/16/2020    ALT 6 (L) 08/16/2023    AST 21 08/16/2023     08/16/2023    K 4.3 08/16/2023     08/16/2023    CREATININE 1.19 (H) 08/16/2023    BUN 20 08/16/2023    CO2 26 08/16/2023    TSH 0.71 12/16/2020    INR 1.3 (H) 08/16/2023    HGBA1C 6.0 02/14/2021     Provider Impression:   Dementia with Behavioral Disturbances, Depression, Anxiety  - remain on secure behavioral unit  - continue with fluvoxamine, buspar  - fall precautions  - monitor behaviors  - consult psych as needed    Parkinson's Disease  - continue with carbidopa-levodopa at scheduled times  - monitor    CV- Afib, HTN, CHF  - continue with Eliqus for stroke prevention  -  continue with digoxin for rate control  - monitor level PRN    Recurrent UTI's  - continue with suppressive atb with keflex  - continue with cranberry capsules    Constipation Prevention  - c/w stool softeners and laxatives PRN  - having regular bowel movements    Hypothyroidism  - continue with synthroid  - monitor level PRN    GERD  - continue with pepcid    Vitamin D + B12 deficiency  - continue with supplements    Insomnia  - continue with melatonin    Code Status  - [facility]  ----------------  Written by Balbina Mclaughlin RN, acting as a scribe for Dr. Corrales. This note accurately reflects the work and decisions made by Dr. Corrales.     I, Dr. Corrales, attest all medical record entries made by the scribe were under my direction and were personally dictated by me. I have reviewed the chart and agree that the record accurately reflects my performance of the history, physical exam, and assessment and plan.

## 2024-07-05 ENCOUNTER — NURSING HOME VISIT (OUTPATIENT)
Dept: POST ACUTE CARE | Facility: EXTERNAL LOCATION | Age: 89
End: 2024-07-05
Payer: COMMERCIAL

## 2024-07-05 DIAGNOSIS — N39.0 URINARY TRACT INFECTION WITHOUT HEMATURIA, SITE UNSPECIFIED: ICD-10-CM

## 2024-07-05 DIAGNOSIS — G20.A1 PARKINSON'S DISEASE, UNSPECIFIED WHETHER DYSKINESIA PRESENT, UNSPECIFIED WHETHER MANIFESTATIONS FLUCTUATE: ICD-10-CM

## 2024-07-05 DIAGNOSIS — F41.8 DEPRESSION WITH ANXIETY: ICD-10-CM

## 2024-07-05 DIAGNOSIS — E03.9 HYPOTHYROIDISM, UNSPECIFIED TYPE: ICD-10-CM

## 2024-07-05 DIAGNOSIS — I48.91 ATRIAL FIBRILLATION, UNSPECIFIED TYPE (MULTI): ICD-10-CM

## 2024-07-05 DIAGNOSIS — F03.C3 SEVERE DEMENTIA WITH MOOD DISTURBANCE, UNSPECIFIED DEMENTIA TYPE (MULTI): ICD-10-CM

## 2024-07-05 DIAGNOSIS — Z78.9 NURSING HOME RESIDENT: ICD-10-CM

## 2024-07-05 DIAGNOSIS — E56.9 VITAMIN DEFICIENCY: ICD-10-CM

## 2024-07-05 DIAGNOSIS — Z00.00 ENCOUNTER FOR ANNUAL WELLNESS VISIT (AWV) IN MEDICARE PATIENT: ICD-10-CM

## 2024-07-05 DIAGNOSIS — K21.9 GASTROESOPHAGEAL REFLUX DISEASE, UNSPECIFIED WHETHER ESOPHAGITIS PRESENT: ICD-10-CM

## 2024-07-05 DIAGNOSIS — I15.9 SECONDARY HYPERTENSION: ICD-10-CM

## 2024-07-05 PROCEDURE — G0439 PPPS, SUBSEQ VISIT: HCPCS | Performed by: INTERNAL MEDICINE

## 2024-07-05 NOTE — LETTER
Patient: Shanon Smith  : 8/3/1931    Encounter Date: 2024    Name: Shanon Smith  : 8/3/1931  MRN: [id number]  Visit Date: 2024  Chief Complaint: Monthly visit for management of chronic disease. ANNUAL HISTORY AND PHYSICAL. ANNUAL MEDICARE WELLNESS VISIT.    HPI: 89 y/o CF who was admitted to [facility] on  from home with family for long term care due to her progressive dementia with behavioral disturbances. Patient will be admitted to the secure behavioral care unit at [facility].    Subjective: Seen and examined today. Sitting up in wheelchair in common area. Pleasantly confused on exam. Offers no complaints. Nursing reports no issues. I have reviewed nursing notes since my last visit and document any significant changes Reviewed orders, medications, Labs. Reviewed and updated Interval hx and meds reviewed. Reviewed chart looking at current medications, treatments, labs, x-rays etc.     ROS:  As above in subjective. Otherwise, all other systems have been reviewed and are negative for complaint.    Visit Vitals  /85   Pulse 76   Temp 36.2 °C (97.2 °F)   Resp 18   Wt 66.3 kg (146 lb 3.2 oz)   SpO2 97%   BMI 29.53 kg/m²   Smoking Status Never   BSA 1.66 m²      Current Outpatient Medications   Medication Instructions   • acetaminophen (TYLENOL) 650 mg, oral, Every 6 hours PRN   • alum-mag hydroxide-simeth (Maalox MAX) 400-400-40 mg/5 mL suspension 10 mL, oral, Nightly   • apixaban (ELIQUIS) 2.5 mg, oral, 2 times daily   • busPIRone (BUSPAR) 5 mg, oral, 2 times daily   • carbidopa-levodopa (Sinemet CR)  mg ER tablet 2 tablets, oral, 3 times daily, Do not crush, chew, or split.   • cephalexin (KEFLEX) 250 mg, oral, Daily, Ppx atb   • cholecalciferol, vitamin D3, 25 mcg (1,000 unit) tablet,chewable 2 tablets, oral, Daily   • cranberry extract 425 mg capsule 1 capsule, oral, Daily   • cyanocobalamin (VITAMIN B-12) 500 mcg, oral, Daily   • diclofenac sodium (VOLTAREN) 4 g, Topical, 4  times daily PRN   • digoxin (Lanoxin) 125 MCG tablet 1 tablet, oral, 3 times weekly, On Mon / Wed / Fri.   • famotidine (PEPCID) 20 mg, oral, Daily   • fLuvoxaMINE (LUVOX) 200 mg, oral, Nightly   • folic acid (FOLVITE) 1 mg, oral, Daily   • ipratropium-albuteroL (Duo-Neb) 0.5-2.5 mg/3 mL nebulizer solution 3 mL, inhalation, Every 6 hours PRN   • Lactobacillus acidophilus 100 mg (1 billion cell) capsule 1 capsule, oral, Daily   • levothyroxine (TIROSINT) 75 mcg, oral, Daily before breakfast   • loratadine (CLARITIN) 10 mg, oral, Daily   • magnesium hydroxide (Milk of Magnesia) 400 mg/5 mL suspension 30 mL, oral, Daily PRN   • melatonin 5 mg, oral, Nightly PRN   • ondansetron (ZOFRAN) 4 mg, oral, Every 6 hours PRN   • white petrolatum-mineral oiL (Tears Naturale PM) 94-3 % ointment ophthalmic ointment 1 Application, Both Eyes, 3 times daily PRN      Past Medical History:   Diagnosis Date   • Alzheimer's disease with late onset (CODE) 05/05/2021    Late onset Alzheimer's dementia without behavioral disturbance   • Arthritis of knee, left 03/11/2024   • Asymmetric SNHL (sensorineural hearing loss) 03/11/2024   • Clouded consciousness 03/11/2024   • Diverticulosis 03/11/2024   • Encounter for palliative care 04/26/2021    Hospice care patient   • Encounter for palliative care 05/05/2021    Hospice care patient   • History of colonic polyps 03/11/2024   • Hyperlipidemia 03/11/2024   • Inflammatory bowel disease 03/11/2024   • Pancytopenia 03/11/2024   • Parkinson's disease (Multi) 12/14/2015    Parkinsonism   • Personal history of other diseases of the circulatory system 04/26/2021    History of atrial fibrillation   • Personal history of other diseases of the circulatory system 05/05/2021    History of atrial fibrillation   • Personal history of other diseases of the nervous system and sense organs 04/26/2021    History of Parkinson's disease   • Personal history of other diseases of the nervous system and sense organs  05/05/2021    History of restless legs syndrome   • Personal history of other mental and behavioral disorders 07/16/2018    History of dementia   • Personal history of other specified conditions 04/26/2021    History of weakness   • Primary osteoarthritis involving multiple joints 03/11/2024   • Rectal hemorrhage 03/11/2024   • Rectal prolapse 03/11/2024   • Recurrent falls 11/14/2020    Formatting of this note might be different from the original. --fall precautions --PT/OT eval   • Thrombocytopenia (CMS-HCC) 03/11/2024   • Unspecified abnormalities of gait and mobility 05/06/2015    Gait disturbance      Past Surgical History:   Procedure Laterality Date   • MR HEAD ANGIO WO IV CONTRAST  9/14/2014    MR HEAD ANGIO WO IV CONTRAST [facility]   • OTHER SURGICAL HISTORY  10/07/2022    Cholecystectomy      Social History     Tobacco Use   • Smoking status: Never   • Smokeless tobacco: Never   Substance Use Topics   • Alcohol use: Not Currently      Family History   Family history unknown: Yes      Allergies   Allergen Reactions   • Midazolam Nausea And Vomiting   • Sulfamethoxazole-Trimethoprim GI Upset   • Erythromycin GI Upset and Nausea/vomiting        Physical Exam:  CONSTITUTIONAL: alert and oriented to self, confusion, no distress, cooperative, guarded  SKIN: Warm & Dry, no lesions, no rashes.  EYES: EOMI, clear sclera  ENMT: Mucous membranes moist, no apparent injury, no lesions seen. [place].   HEAD/NECK: No lymphadenopathy, thyromegaly or JVD.  HEART: Regular rate & rhythm, no murmurs, 2+ equal pulses of the extremities, Normal S1 & S2.  LUNGS: Patent airways, CTAB, normal breath sounds with good chest expansion, thorax symmetric  ABDOMEN: Nondistended, soft, slightly tender, +BS, no rebound tenderness or guarding.   EXTREMITIES: Normal extremities, no cyanosis, trace ankle edema, no contusions, no clubbing  NEUROLOGIC: intact senses, motor, response and reflexes ok, weakness    Results/Data:   Lab  Results   Component Value Date    WBC 5.8 08/16/2023    HGB 13.2 08/16/2023    HCT 41.1 08/16/2023     08/16/2023    CHOL 163 12/16/2020    TRIG 112 12/16/2020    HDL 50 (L) 12/16/2020    ALT 6 (L) 08/16/2023    AST 21 08/16/2023     08/16/2023    K 4.3 08/16/2023     08/16/2023    CREATININE 1.19 (H) 08/16/2023    BUN 20 08/16/2023    CO2 26 08/16/2023    TSH 0.71 12/16/2020    INR 1.3 (H) 08/16/2023    HGBA1C 6.0 02/14/2021     Provider Impression:   Dementia with Behavioral Disturbances, Depression, Anxiety  - remain on secure behavioral unit  - continue with fluvoxamine, buspar  - fall precautions  - monitor behaviors  - consult psych as needed    Parkinson's Disease  - continue with carbidopa-levodopa at scheduled times  - monitor    CV- Afib, HTN, CHF  - continue with Eliqus for stroke prevention  - continue with digoxin for rate control  - monitor level PRN    Recurrent UTI's  - continue with suppressive atb with keflex  - continue with cranberry capsules    Constipation Prevention  - c/w stool softeners and laxatives PRN  - having regular bowel movements    Hypothyroidism  - continue with synthroid  - monitor level PRN    GERD  - continue with pepcid    Vitamin D + B12 deficiency  - continue with supplements    Insomnia  - continue with melatonin    Code Status  - [facility]  ----------------  Written by Balbina Mclaughlin RN, acting as a scribe for Dr. Corrales. This note accurately reflects the work and decisions made by Dr. Corrales.     I, Dr. Corrales, attest all medical record entries made by the scribe were under my direction and were personally dictated by me. I have reviewed the chart and agree that the record accurately reflects my performance of the history, physical exam, and assessment and plan.       Electronically Signed By: Noni Land MD   10/15/24  1:22 PM

## 2024-09-08 ENCOUNTER — NURSING HOME VISIT (OUTPATIENT)
Dept: POST ACUTE CARE | Facility: EXTERNAL LOCATION | Age: 89
End: 2024-09-08
Payer: MEDICARE

## 2024-09-08 DIAGNOSIS — K21.9 GASTROESOPHAGEAL REFLUX DISEASE, UNSPECIFIED WHETHER ESOPHAGITIS PRESENT: ICD-10-CM

## 2024-09-08 DIAGNOSIS — F41.8 DEPRESSION WITH ANXIETY: ICD-10-CM

## 2024-09-08 DIAGNOSIS — Z78.9 NURSING HOME RESIDENT: ICD-10-CM

## 2024-09-08 DIAGNOSIS — E56.9 VITAMIN DEFICIENCY: ICD-10-CM

## 2024-09-08 DIAGNOSIS — I15.9 SECONDARY HYPERTENSION: ICD-10-CM

## 2024-09-08 DIAGNOSIS — Z00.00 ROUTINE GENERAL MEDICAL EXAMINATION AT A HEALTH CARE FACILITY: ICD-10-CM

## 2024-09-08 DIAGNOSIS — E03.9 HYPOTHYROIDISM, UNSPECIFIED TYPE: ICD-10-CM

## 2024-09-08 DIAGNOSIS — F03.C3 SEVERE DEMENTIA WITH MOOD DISTURBANCE, UNSPECIFIED DEMENTIA TYPE (MULTI): ICD-10-CM

## 2024-09-08 DIAGNOSIS — G20.A1 PARKINSON'S DISEASE, UNSPECIFIED WHETHER DYSKINESIA PRESENT, UNSPECIFIED WHETHER MANIFESTATIONS FLUCTUATE: ICD-10-CM

## 2024-09-08 DIAGNOSIS — I48.91 ATRIAL FIBRILLATION, UNSPECIFIED TYPE (MULTI): ICD-10-CM

## 2024-09-08 PROCEDURE — 99308 SBSQ NF CARE LOW MDM 20: CPT | Performed by: INTERNAL MEDICINE

## 2024-09-08 NOTE — LETTER
Patient: Shanon Smith  : 8/3/1931    Encounter Date: 2024    Name: Shanon Smith  : 8/3/1931  MRN: [id number]  Visit Date: 2024  Chief Complaint: Monthly visit for management of chronic disease.    HPI: 89 y/o CF who was admitted to [facility] on  from home with family for long term care due to her progressive dementia with behavioral disturbances. Patient will be admitted to the secure behavioral care unit at [facility].    Subjective: Seen and examined today. Sitting up in wheelchair in common area. Pleasantly confused on exam. Offers no complaints. Nursing reports no issues.     I have reviewed nursing notes since my last visit and document any significant changes Reviewed orders, medications, Labs. Reviewed and updated Interval hx and meds reviewed. Reviewed chart looking at current medications, treatments, labs, x-rays etc.     ROS:  As above in subjective. Otherwise, all other systems have been reviewed and are negative for complaint.    Current Outpatient Medications   Medication Instructions   • acetaminophen (TYLENOL) 650 mg, oral, Every 6 hours PRN   • alum-mag hydroxide-simeth (Maalox MAX) 400-400-40 mg/5 mL suspension 10 mL, oral, Nightly   • apixaban (ELIQUIS) 2.5 mg, oral, 2 times daily   • busPIRone (BUSPAR) 5 mg, oral, 2 times daily   • carbidopa-levodopa (Sinemet CR)  mg ER tablet 2 tablets, oral, 3 times daily, Do not crush, chew, or split.   • cephalexin (KEFLEX) 250 mg, oral, Daily, Ppx atb   • cholecalciferol, vitamin D3, 25 mcg (1,000 unit) tablet,chewable 2 tablets, oral, Daily   • cranberry extract 425 mg capsule 1 capsule, oral, Daily   • cyanocobalamin (VITAMIN B-12) 500 mcg, oral, Daily   • diclofenac sodium (VOLTAREN) 4 g, Topical, 4 times daily PRN   • digoxin (Lanoxin) 125 MCG tablet 1 tablet, oral, 3 times weekly, On Mon / Wed / Fri.   • famotidine (PEPCID) 20 mg, oral, Daily   • fLuvoxaMINE (LUVOX) 200 mg, oral, Nightly   • folic acid (FOLVITE) 1 mg,  oral, Daily   • ipratropium-albuteroL (Duo-Neb) 0.5-2.5 mg/3 mL nebulizer solution 3 mL, inhalation, Every 6 hours PRN   • Lactobacillus acidophilus 100 mg (1 billion cell) capsule 1 capsule, oral, Daily   • levothyroxine (TIROSINT) 75 mcg, oral, Daily before breakfast   • loratadine (CLARITIN) 10 mg, oral, Daily   • magnesium hydroxide (Milk of Magnesia) 400 mg/5 mL suspension 30 mL, oral, Daily PRN   • melatonin 5 mg, oral, Nightly PRN   • ondansetron (ZOFRAN) 4 mg, oral, Every 6 hours PRN   • white petrolatum-mineral oiL (Tears Naturale PM) 94-3 % ointment ophthalmic ointment 1 Application, Both Eyes, 3 times daily PRN     Physical Exam:  CONSTITUTIONAL: alert and oriented to self, confusion, no distress, cooperative, guarded  SKIN: Warm & Dry, no lesions, no rashes.  EYES: EOMI, clear sclera  ENMT: Mucous membranes moist, no apparent injury, no lesions seen. [place].   HEAD/NECK: No lymphadenopathy, thyromegaly or JVD.  HEART: Regular rate & rhythm, no murmurs, 2+ equal pulses of the extremities, Normal S1 & S2.  LUNGS: Patent airways, CTAB, normal breath sounds with good chest expansion, thorax symmetric  ABDOMEN: Nondistended, soft, slightly tender, +BS, no rebound tenderness or guarding.   EXTREMITIES: Normal extremities, no cyanosis, trace ankle edema, no contusions, no clubbing  NEUROLOGIC: intact senses, motor, response and reflexes ok, weakness    Results/Data:   Lab Results   Component Value Date    WBC 5.8 08/16/2023    HGB 13.2 08/16/2023    HCT 41.1 08/16/2023     08/16/2023    CHOL 163 12/16/2020    TRIG 112 12/16/2020    HDL 50 (L) 12/16/2020    ALT 6 (L) 08/16/2023    AST 21 08/16/2023     08/16/2023    K 4.3 08/16/2023     08/16/2023    CREATININE 1.19 (H) 08/16/2023    BUN 20 08/16/2023    CO2 26 08/16/2023    TSH 0.71 12/16/2020    INR 1.3 (H) 08/16/2023    HGBA1C 6.0 02/14/2021     Provider Impression:   Dementia with Behavioral Disturbances, Depression, Anxiety  - remain on  secure behavioral unit  - continue with fluvoxamine, buspar  - fall precautions  - monitor behaviors  - consult psych as needed    Parkinson's Disease  - continue with carbidopa-levodopa at scheduled times  - monitor    CV- Afib, HTN, CHF  - continue with Eliqus for stroke prevention  - continue with digoxin for rate control  - monitor level PRN    Recurrent UTI's  - continue with suppressive atb with keflex  - continue with cranberry capsules    Constipation Prevention  - c/w stool softeners and laxatives PRN  - having regular bowel movements    Hypothyroidism  - continue with synthroid  - monitor level PRN    GERD  - continue with pepcid    Vitamin D + B12 deficiency  - continue with supplements    Insomnia  - continue with melatonin    Code Status  - [facility]  ----------------  Written by Balbina Mclaughlin RN, acting as a scribe for Dr. Corrales. This note accurately reflects the work and decisions made by Dr. Corrales.     I, Dr. Corrales, attest all medical record entries made by the scribe were under my direction and were personally dictated by me. I have reviewed the chart and agree that the record accurately reflects my performance of the history, physical exam, and assessment and plan.       Electronically Signed By: Noni Land MD   10/15/24  1:22 PM

## 2024-10-13 VITALS
TEMPERATURE: 97.2 F | WEIGHT: 146.2 LBS | OXYGEN SATURATION: 97 % | BODY MASS INDEX: 29.53 KG/M2 | RESPIRATION RATE: 18 BRPM | SYSTOLIC BLOOD PRESSURE: 132 MMHG | HEART RATE: 76 BPM | DIASTOLIC BLOOD PRESSURE: 85 MMHG

## 2024-10-13 RX ORDER — FLUVOXAMINE MALEATE 50 MG/1
200 TABLET, FILM COATED ORAL NIGHTLY
COMMUNITY

## 2024-10-13 RX ORDER — ONDANSETRON 4 MG/1
4 TABLET, FILM COATED ORAL EVERY 6 HOURS PRN
COMMUNITY

## 2024-10-13 RX ORDER — ADHESIVE BANDAGE
30 BANDAGE TOPICAL DAILY PRN
COMMUNITY

## 2024-10-13 RX ORDER — DICLOFENAC SODIUM 10 MG/G
4 GEL TOPICAL 4 TIMES DAILY PRN
COMMUNITY

## 2024-10-13 ASSESSMENT — ACTIVITIES OF DAILY LIVING (ADL)
DOING_HOUSEWORK: TOTAL CARE
TAKING_MEDICATION: NEEDS ASSISTANCE
BATHING: DEPENDENT
DRESSING: NEEDS ASSISTANCE
GROCERY_SHOPPING: TOTAL CARE
MANAGING_FINANCES: TOTAL CARE

## 2024-10-13 ASSESSMENT — ENCOUNTER SYMPTOMS
DEPRESSION: 1
LOSS OF SENSATION IN FEET: 0
OCCASIONAL FEELINGS OF UNSTEADINESS: 1

## 2024-10-13 ASSESSMENT — PATIENT HEALTH QUESTIONNAIRE - PHQ9
1. LITTLE INTEREST OR PLEASURE IN DOING THINGS: SEVERAL DAYS
2. FEELING DOWN, DEPRESSED OR HOPELESS: SEVERAL DAYS
10. IF YOU CHECKED OFF ANY PROBLEMS, HOW DIFFICULT HAVE THESE PROBLEMS MADE IT FOR YOU TO DO YOUR WORK, TAKE CARE OF THINGS AT HOME, OR GET ALONG WITH OTHER PEOPLE: SOMEWHAT DIFFICULT
SUM OF ALL RESPONSES TO PHQ9 QUESTIONS 1 AND 2: 2

## 2024-10-13 NOTE — PROGRESS NOTES
Name: Shanon Smith  : 8/3/1931  MRN: [id number]  Visit Date: 2024  Chief Complaint: Monthly visit for management of chronic disease. ANNUAL HISTORY AND PHYSICAL. ANNUAL MEDICARE WELLNESS VISIT.    HPI: 89 y/o CF who was admitted to [facility] on  from home with family for long term care due to her progressive dementia with behavioral disturbances. Patient will be admitted to the secure behavioral care unit at [facility].    Subjective: Seen and examined today. Sitting up in wheelchair in common area. Pleasantly confused on exam. Offers no complaints. Nursing reports no issues. I have reviewed nursing notes since my last visit and document any significant changes Reviewed orders, medications, Labs. Reviewed and updated Interval hx and meds reviewed. Reviewed chart looking at current medications, treatments, labs, x-rays etc.     ROS:  As above in subjective. Otherwise, all other systems have been reviewed and are negative for complaint.    Visit Vitals  /85   Pulse 76   Temp 36.2 °C (97.2 °F)   Resp 18   Wt 66.3 kg (146 lb 3.2 oz)   SpO2 97%   BMI 29.53 kg/m²   Smoking Status Never   BSA 1.66 m²      Current Outpatient Medications   Medication Instructions    acetaminophen (TYLENOL) 650 mg, oral, Every 6 hours PRN    alum-mag hydroxide-simeth (Maalox MAX) 400-400-40 mg/5 mL suspension 10 mL, oral, Nightly    apixaban (ELIQUIS) 2.5 mg, oral, 2 times daily    busPIRone (BUSPAR) 5 mg, oral, 2 times daily    carbidopa-levodopa (Sinemet CR)  mg ER tablet 2 tablets, oral, 3 times daily, Do not crush, chew, or split.    cephalexin (KEFLEX) 250 mg, oral, Daily, Ppx atb    cholecalciferol, vitamin D3, 25 mcg (1,000 unit) tablet,chewable 2 tablets, oral, Daily    cranberry extract 425 mg capsule 1 capsule, oral, Daily    cyanocobalamin (VITAMIN B-12) 500 mcg, oral, Daily    diclofenac sodium (VOLTAREN) 4 g, Topical, 4 times daily PRN    digoxin (Lanoxin) 125 MCG tablet 1 tablet, oral, 3 times  weekly, On Mon / Wed / Fri.    famotidine (PEPCID) 20 mg, oral, Daily    fLuvoxaMINE (LUVOX) 200 mg, oral, Nightly    folic acid (FOLVITE) 1 mg, oral, Daily    ipratropium-albuteroL (Duo-Neb) 0.5-2.5 mg/3 mL nebulizer solution 3 mL, inhalation, Every 6 hours PRN    Lactobacillus acidophilus 100 mg (1 billion cell) capsule 1 capsule, oral, Daily    levothyroxine (TIROSINT) 75 mcg, oral, Daily before breakfast    loratadine (CLARITIN) 10 mg, oral, Daily    magnesium hydroxide (Milk of Magnesia) 400 mg/5 mL suspension 30 mL, oral, Daily PRN    melatonin 5 mg, oral, Nightly PRN    ondansetron (ZOFRAN) 4 mg, oral, Every 6 hours PRN    white petrolatum-mineral oiL (Tears Naturale PM) 94-3 % ointment ophthalmic ointment 1 Application, Both Eyes, 3 times daily PRN      Past Medical History:   Diagnosis Date    Alzheimer's disease with late onset (CODE) 05/05/2021    Late onset Alzheimer's dementia without behavioral disturbance    Arthritis of knee, left 03/11/2024    Asymmetric SNHL (sensorineural hearing loss) 03/11/2024    Clouded consciousness 03/11/2024    Diverticulosis 03/11/2024    Encounter for palliative care 04/26/2021    Hospice care patient    Encounter for palliative care 05/05/2021    Hospice care patient    History of colonic polyps 03/11/2024    Hyperlipidemia 03/11/2024    Inflammatory bowel disease 03/11/2024    Pancytopenia 03/11/2024    Parkinson's disease (Multi) 12/14/2015    Parkinsonism    Personal history of other diseases of the circulatory system 04/26/2021    History of atrial fibrillation    Personal history of other diseases of the circulatory system 05/05/2021    History of atrial fibrillation    Personal history of other diseases of the nervous system and sense organs 04/26/2021    History of Parkinson's disease    Personal history of other diseases of the nervous system and sense organs 05/05/2021    History of restless legs syndrome    Personal history of other mental and behavioral  disorders 07/16/2018    History of dementia    Personal history of other specified conditions 04/26/2021    History of weakness    Primary osteoarthritis involving multiple joints 03/11/2024    Rectal hemorrhage 03/11/2024    Rectal prolapse 03/11/2024    Recurrent falls 11/14/2020    Formatting of this note might be different from the original. --fall precautions --PT/OT eval    Thrombocytopenia (CMS-HCC) 03/11/2024    Unspecified abnormalities of gait and mobility 05/06/2015    Gait disturbance      Past Surgical History:   Procedure Laterality Date    MR HEAD ANGIO WO IV CONTRAST  9/14/2014    MR HEAD ANGIO WO IV CONTRAST [facility]    OTHER SURGICAL HISTORY  10/07/2022    Cholecystectomy      Social History     Tobacco Use    Smoking status: Never    Smokeless tobacco: Never   Substance Use Topics    Alcohol use: Not Currently      Family History   Family history unknown: Yes      Allergies   Allergen Reactions    Midazolam Nausea And Vomiting    Sulfamethoxazole-Trimethoprim GI Upset    Erythromycin GI Upset and Nausea/vomiting        Physical Exam:  CONSTITUTIONAL: alert and oriented to self, confusion, no distress, cooperative, guarded  SKIN: Warm & Dry, no lesions, no rashes.  EYES: EOMI, clear sclera  ENMT: Mucous membranes moist, no apparent injury, no lesions seen. [place].   HEAD/NECK: No lymphadenopathy, thyromegaly or JVD.  HEART: Regular rate & rhythm, no murmurs, 2+ equal pulses of the extremities, Normal S1 & S2.  LUNGS: Patent airways, CTAB, normal breath sounds with good chest expansion, thorax symmetric  ABDOMEN: Nondistended, soft, slightly tender, +BS, no rebound tenderness or guarding.   EXTREMITIES: Normal extremities, no cyanosis, trace ankle edema, no contusions, no clubbing  NEUROLOGIC: intact senses, motor, response and reflexes ok, weakness    Results/Data:   Lab Results   Component Value Date    WBC 5.8 08/16/2023    HGB 13.2 08/16/2023    HCT 41.1 08/16/2023     08/16/2023     CHOL 163 12/16/2020    TRIG 112 12/16/2020    HDL 50 (L) 12/16/2020    ALT 6 (L) 08/16/2023    AST 21 08/16/2023     08/16/2023    K 4.3 08/16/2023     08/16/2023    CREATININE 1.19 (H) 08/16/2023    BUN 20 08/16/2023    CO2 26 08/16/2023    TSH 0.71 12/16/2020    INR 1.3 (H) 08/16/2023    HGBA1C 6.0 02/14/2021     Provider Impression:   Dementia with Behavioral Disturbances, Depression, Anxiety  - remain on secure behavioral unit  - continue with fluvoxamine, buspar  - fall precautions  - monitor behaviors  - consult psych as needed    Parkinson's Disease  - continue with carbidopa-levodopa at scheduled times  - monitor    CV- Afib, HTN, CHF  - continue with Eliqus for stroke prevention  - continue with digoxin for rate control  - monitor level PRN    Recurrent UTI's  - continue with suppressive atb with keflex  - continue with cranberry capsules    Constipation Prevention  - c/w stool softeners and laxatives PRN  - having regular bowel movements    Hypothyroidism  - continue with synthroid  - monitor level PRN    GERD  - continue with pepcid    Vitamin D + B12 deficiency  - continue with supplements    Insomnia  - continue with melatonin    [facility]  ----------------  Written by Balbina Mclaughlin RN, acting as a scribe for Dr. Corrales. This note accurately reflects the work and decisions made by Dr. Corrales.     I, Dr. Corrales, attest all medical record entries made by the scribe were under my direction and were personally dictated by me. I have reviewed the chart and agree that the record accurately reflects my performance of the history, physical exam, and assessment and plan.

## 2024-10-13 NOTE — PROGRESS NOTES
Name: Shanon Smith  : 8/3/1931  MRN: [id number]  Visit Date: 2024  Chief Complaint: Monthly visit for management of chronic disease.    HPI: 89 y/o CF who was admitted to [facility] on  from home with family for long term care due to her progressive dementia with behavioral disturbances. Patient will be admitted to the secure behavioral care unit at [facility].    Subjective: Seen and examined today. Sitting up in wheelchair in common area. Pleasantly confused on exam. Offers no complaints. Nursing reports no issues.     I have reviewed nursing notes since my last visit and document any significant changes Reviewed orders, medications, Labs. Reviewed and updated Interval hx and meds reviewed. Reviewed chart looking at current medications, treatments, labs, x-rays etc.     ROS:  As above in subjective. Otherwise, all other systems have been reviewed and are negative for complaint.    Current Outpatient Medications   Medication Instructions    acetaminophen (TYLENOL) 650 mg, oral, Every 6 hours PRN    alum-mag hydroxide-simeth (Maalox MAX) 400-400-40 mg/5 mL suspension 10 mL, oral, Nightly    apixaban (ELIQUIS) 2.5 mg, oral, 2 times daily    busPIRone (BUSPAR) 5 mg, oral, 2 times daily    carbidopa-levodopa (Sinemet CR)  mg ER tablet 2 tablets, oral, 3 times daily, Do not crush, chew, or split.    cephalexin (KEFLEX) 250 mg, oral, Daily, Ppx atb    cholecalciferol, vitamin D3, 25 mcg (1,000 unit) tablet,chewable 2 tablets, oral, Daily    cranberry extract 425 mg capsule 1 capsule, oral, Daily    cyanocobalamin (VITAMIN B-12) 500 mcg, oral, Daily    diclofenac sodium (VOLTAREN) 4 g, Topical, 4 times daily PRN    digoxin (Lanoxin) 125 MCG tablet 1 tablet, oral, 3 times weekly, On Mon / Wed / Fri.    famotidine (PEPCID) 20 mg, oral, Daily    fLuvoxaMINE (LUVOX) 200 mg, oral, Nightly    folic acid (FOLVITE) 1 mg, oral, Daily    ipratropium-albuteroL (Duo-Neb) 0.5-2.5 mg/3 mL nebulizer solution 3 mL,  inhalation, Every 6 hours PRN    Lactobacillus acidophilus 100 mg (1 billion cell) capsule 1 capsule, oral, Daily    levothyroxine (TIROSINT) 75 mcg, oral, Daily before breakfast    loratadine (CLARITIN) 10 mg, oral, Daily    magnesium hydroxide (Milk of Magnesia) 400 mg/5 mL suspension 30 mL, oral, Daily PRN    melatonin 5 mg, oral, Nightly PRN    ondansetron (ZOFRAN) 4 mg, oral, Every 6 hours PRN    white petrolatum-mineral oiL (Tears Naturale PM) 94-3 % ointment ophthalmic ointment 1 Application, Both Eyes, 3 times daily PRN     Physical Exam:  CONSTITUTIONAL: alert and oriented to self, confusion, no distress, cooperative, guarded  SKIN: Warm & Dry, no lesions, no rashes.  EYES: EOMI, clear sclera  ENMT: Mucous membranes moist, no apparent injury, no lesions seen. [place].   HEAD/NECK: No lymphadenopathy, thyromegaly or JVD.  HEART: Regular rate & rhythm, no murmurs, 2+ equal pulses of the extremities, Normal S1 & S2.  LUNGS: Patent airways, CTAB, normal breath sounds with good chest expansion, thorax symmetric  ABDOMEN: Nondistended, soft, slightly tender, +BS, no rebound tenderness or guarding.   EXTREMITIES: Normal extremities, no cyanosis, trace ankle edema, no contusions, no clubbing  NEUROLOGIC: intact senses, motor, response and reflexes ok, weakness    Results/Data:   Lab Results   Component Value Date    WBC 5.8 08/16/2023    HGB 13.2 08/16/2023    HCT 41.1 08/16/2023     08/16/2023    CHOL 163 12/16/2020    TRIG 112 12/16/2020    HDL 50 (L) 12/16/2020    ALT 6 (L) 08/16/2023    AST 21 08/16/2023     08/16/2023    K 4.3 08/16/2023     08/16/2023    CREATININE 1.19 (H) 08/16/2023    BUN 20 08/16/2023    CO2 26 08/16/2023    TSH 0.71 12/16/2020    INR 1.3 (H) 08/16/2023    HGBA1C 6.0 02/14/2021     Provider Impression:   Dementia with Behavioral Disturbances, Depression, Anxiety  - remain on secure behavioral unit  - continue with fluvoxamine, buspar  - fall precautions  - monitor  behaviors  - consult psych as needed    Parkinson's Disease  - continue with carbidopa-levodopa at scheduled times  - monitor    CV- Afib, HTN, CHF  - continue with Eliqus for stroke prevention  - continue with digoxin for rate control  - monitor level PRN    Recurrent UTI's  - continue with suppressive atb with keflex  - continue with cranberry capsules    Constipation Prevention  - c/w stool softeners and laxatives PRN  - having regular bowel movements    Hypothyroidism  - continue with synthroid  - monitor level PRN    GERD  - continue with pepcid    Vitamin D + B12 deficiency  - continue with supplements    Insomnia  - continue with melatonin    [facility]  [facility]  ----------------  Written by Balbina Mclaughlin RN, acting as a scribe for Dr. Corrales. This note accurately reflects the work and decisions made by Dr. Corrales.     I, Dr. Corrales, attest all medical record entries made by the scribe were under my direction and were personally dictated by me. I have reviewed the chart and agree that the record accurately reflects my performance of the history, physical exam, and assessment and plan.

## 2024-11-09 ENCOUNTER — NURSING HOME VISIT (OUTPATIENT)
Dept: POST ACUTE CARE | Facility: EXTERNAL LOCATION | Age: 89
End: 2024-11-09
Payer: MEDICARE

## 2024-11-09 DIAGNOSIS — G20.A1 PARKINSON'S DISEASE, UNSPECIFIED WHETHER DYSKINESIA PRESENT, UNSPECIFIED WHETHER MANIFESTATIONS FLUCTUATE: ICD-10-CM

## 2024-11-09 DIAGNOSIS — Z78.9 NURSING HOME RESIDENT: ICD-10-CM

## 2024-11-09 DIAGNOSIS — E03.9 HYPOTHYROIDISM, UNSPECIFIED TYPE: ICD-10-CM

## 2024-11-09 DIAGNOSIS — I48.91 ATRIAL FIBRILLATION, UNSPECIFIED TYPE (MULTI): ICD-10-CM

## 2024-11-09 DIAGNOSIS — F41.8 DEPRESSION WITH ANXIETY: ICD-10-CM

## 2024-11-09 DIAGNOSIS — E56.9 VITAMIN DEFICIENCY: ICD-10-CM

## 2024-11-09 DIAGNOSIS — F03.C3 SEVERE DEMENTIA WITH MOOD DISTURBANCE, UNSPECIFIED DEMENTIA TYPE (MULTI): ICD-10-CM

## 2024-11-09 DIAGNOSIS — I15.9 SECONDARY HYPERTENSION: ICD-10-CM

## 2024-11-09 DIAGNOSIS — Z00.00 ROUTINE GENERAL MEDICAL EXAMINATION AT A HEALTH CARE FACILITY: ICD-10-CM

## 2024-11-09 PROCEDURE — 99308 SBSQ NF CARE LOW MDM 20: CPT | Performed by: INTERNAL MEDICINE

## 2024-11-09 NOTE — LETTER
Patient: Shanon Smith  : 8/3/1931    Encounter Date: 2024    Name: Shanon Smith  : 8/3/1931  MRN: [id number]  Visit Date: 2024  Chief Complaint: Monthly visit for management of chronic disease.    HPI: 89 y/o CF who was admitted to [facility] on  from home with family for long term care due to her progressive dementia with behavioral disturbances. Patient will be admitted to the secure behavioral care unit at [facility].    Subjective: Seen and examined today. Sitting up in wheelchair in common area. Pleasantly confused on exam. Offers no complaints. Nursing reports no issues.     I have reviewed nursing notes since my last visit and document any significant changes Reviewed orders, medications, Labs. Reviewed and updated Interval hx and meds reviewed. Reviewed chart looking at current medications, treatments, labs, x-rays etc.     ROS:  As above in subjective. Otherwise, all other systems have been reviewed and are negative for complaint.    Current Outpatient Medications   Medication Instructions   • acetaminophen (TYLENOL) 650 mg, oral, Every 6 hours PRN   • alum-mag hydroxide-simeth (Maalox MAX) 400-400-40 mg/5 mL suspension 10 mL, oral, Nightly   • apixaban (ELIQUIS) 2.5 mg, oral, 2 times daily   • busPIRone (BUSPAR) 5 mg, oral, 2 times daily   • carbidopa-levodopa (Sinemet CR)  mg ER tablet 2 tablets, oral, 3 times daily, Do not crush, chew, or split.   • cephalexin (KEFLEX) 250 mg, oral, Daily, Ppx atb   • cholecalciferol, vitamin D3, 25 mcg (1,000 unit) tablet,chewable 2 tablets, oral, Daily   • cranberry extract 425 mg capsule 1 capsule, oral, Daily   • cyanocobalamin (VITAMIN B-12) 500 mcg, oral, Daily   • diclofenac sodium (VOLTAREN) 4 g, Topical, 4 times daily PRN   • digoxin (Lanoxin) 125 MCG tablet 1 tablet, oral, 3 times weekly, On Mon / Wed / Fri.   • famotidine (PEPCID) 20 mg, oral, Daily   • fLuvoxaMINE (LUVOX) 200 mg, oral, Nightly   • folic acid (FOLVITE) 1 mg,  oral, Daily   • ipratropium-albuteroL (Duo-Neb) 0.5-2.5 mg/3 mL nebulizer solution 3 mL, inhalation, Every 6 hours PRN   • Lactobacillus acidophilus 100 mg (1 billion cell) capsule 1 capsule, oral, Daily   • levothyroxine (TIROSINT) 75 mcg, oral, Daily before breakfast   • loratadine (CLARITIN) 10 mg, oral, Daily   • magnesium hydroxide (Milk of Magnesia) 400 mg/5 mL suspension 30 mL, oral, Daily PRN   • melatonin 5 mg, oral, Nightly PRN   • ondansetron (ZOFRAN) 4 mg, oral, Every 6 hours PRN   • white petrolatum-mineral oiL (Tears Naturale PM) 94-3 % ointment ophthalmic ointment 1 Application, Both Eyes, 3 times daily PRN     Physical Exam:  CONSTITUTIONAL: alert and oriented to self, confusion, no distress, cooperative, guarded  SKIN: Warm & Dry, no lesions, no rashes.  EYES: EOMI, clear sclera  ENMT: Mucous membranes moist, no apparent injury, no lesions seen. [place].   HEAD/NECK: No lymphadenopathy, thyromegaly or JVD.  HEART: Regular rate & rhythm, no murmurs, 2+ equal pulses of the extremities, Normal S1 & S2.  LUNGS: Patent airways, CTAB, normal breath sounds with good chest expansion, thorax symmetric  ABDOMEN: Nondistended, soft, slightly tender, +BS, no rebound tenderness or guarding.   EXTREMITIES: Normal extremities, no cyanosis, trace ankle edema, no contusions, no clubbing  NEUROLOGIC: intact senses, motor, response and reflexes ok, weakness    Results/Data:   Lab Results   Component Value Date    WBC 5.8 08/16/2023    HGB 13.2 08/16/2023    HCT 41.1 08/16/2023     08/16/2023    CHOL 163 12/16/2020    TRIG 112 12/16/2020    HDL 50 (L) 12/16/2020    ALT 6 (L) 08/16/2023    AST 21 08/16/2023     08/16/2023    K 4.3 08/16/2023     08/16/2023    CREATININE 1.19 (H) 08/16/2023    BUN 20 08/16/2023    CO2 26 08/16/2023    TSH 0.71 12/16/2020    INR 1.3 (H) 08/16/2023    HGBA1C 6.0 02/14/2021     Provider Impression:   Dementia with Behavioral Disturbances, Depression, Anxiety  - remain on  secure behavioral unit  - continue with fluvoxamine, buspar  - fall precautions  - monitor behaviors  - consult psych as needed    Parkinson's Disease  - continue with carbidopa-levodopa at scheduled times  - monitor    CV- Afib, HTN, CHF  - continue with Eliqus for stroke prevention  - continue with digoxin for rate control  - monitor level PRN    Recurrent UTI's  - continue with suppressive atb with keflex  - continue with cranberry capsules    Constipation Prevention  - c/w stool softeners and laxatives PRN  - having regular bowel movements    Hypothyroidism  - continue with synthroid  - monitor level PRN    GERD  - continue with pepcid    Vitamin D + B12 deficiency  - continue with supplements    Insomnia  - continue with melatonin    Code Status  - [facility]  ----------------  Written by Balbina Mclaughlin RN, acting as a scribe for Dr. Corrales. This note accurately reflects the work and decisions made by Dr. Corrales.     I, Dr. Corrales, attest all medical record entries made by the scribe were under my direction and were personally dictated by me. I have reviewed the chart and agree that the record accurately reflects my performance of the history, physical exam, and assessment and plan.     Electronically Signed By: Noni Land MD   12/30/24  3:07 PM

## 2024-12-29 PROBLEM — Z00.00 ROUTINE GENERAL MEDICAL EXAMINATION AT A HEALTH CARE FACILITY: Status: ACTIVE | Noted: 2024-12-29

## 2025-01-23 ENCOUNTER — NURSING HOME VISIT (OUTPATIENT)
Dept: POST ACUTE CARE | Facility: EXTERNAL LOCATION | Age: OVER 89
End: 2025-01-23
Payer: MEDICARE

## 2025-01-23 DIAGNOSIS — Z00.00 ROUTINE GENERAL MEDICAL EXAMINATION AT A HEALTH CARE FACILITY: ICD-10-CM

## 2025-01-23 DIAGNOSIS — E56.9 VITAMIN DEFICIENCY: ICD-10-CM

## 2025-01-23 DIAGNOSIS — F41.8 DEPRESSION WITH ANXIETY: ICD-10-CM

## 2025-01-23 DIAGNOSIS — Z78.9 NURSING HOME RESIDENT: ICD-10-CM

## 2025-01-23 DIAGNOSIS — I15.9 SECONDARY HYPERTENSION: ICD-10-CM

## 2025-01-23 DIAGNOSIS — E03.9 HYPOTHYROIDISM, UNSPECIFIED TYPE: ICD-10-CM

## 2025-01-23 DIAGNOSIS — F03.C3 SEVERE DEMENTIA WITH MOOD DISTURBANCE, UNSPECIFIED DEMENTIA TYPE (MULTI): ICD-10-CM

## 2025-01-23 DIAGNOSIS — K21.9 GASTROESOPHAGEAL REFLUX DISEASE, UNSPECIFIED WHETHER ESOPHAGITIS PRESENT: ICD-10-CM

## 2025-01-23 DIAGNOSIS — G20.A1 PARKINSON'S DISEASE, UNSPECIFIED WHETHER DYSKINESIA PRESENT, UNSPECIFIED WHETHER MANIFESTATIONS FLUCTUATE: ICD-10-CM

## 2025-01-23 DIAGNOSIS — I48.91 ATRIAL FIBRILLATION, UNSPECIFIED TYPE (MULTI): ICD-10-CM

## 2025-01-23 PROCEDURE — 99308 SBSQ NF CARE LOW MDM 20: CPT | Performed by: INTERNAL MEDICINE

## 2025-01-23 NOTE — LETTER
Patient: Shanon Smith  : 8/3/1931    Encounter Date: 2025    Name: Shanon Smith  : 8/3/1931  MRN: [id number]  Visit Date: 2025  Chief Complaint: Monthly visit for management of chronic disease.    HPI: 89 y/o CF who was admitted to [facility] on  from home with family for long term care due to her progressive dementia with behavioral disturbances. Patient will be admitted to the secure behavioral care unit at [facility].    Subjective: Seen and examined today. Sitting up in wheelchair in common area. Pleasantly confused on exam. Offers no complaints. Nursing reports no issues.     I have reviewed nursing notes since my last visit and document any significant changes Reviewed orders, medications, Labs. Reviewed and updated Interval hx and meds reviewed. Reviewed chart looking at current medications, treatments, labs, x-rays etc.     ROS:  As above in subjective. Otherwise, all other systems have been reviewed and are negative for complaint.    Current Outpatient Medications   Medication Instructions   • acetaminophen (TYLENOL) 650 mg, oral, Every 6 hours PRN   • alum-mag hydroxide-simeth (Maalox MAX) 400-400-40 mg/5 mL suspension 10 mL, oral, Nightly   • apixaban (ELIQUIS) 2.5 mg, oral, 2 times daily   • busPIRone (BUSPAR) 5 mg, oral, 2 times daily   • carbidopa-levodopa (Sinemet CR)  mg ER tablet 2 tablets, oral, 3 times daily, Do not crush, chew, or split.   • cephalexin (KEFLEX) 250 mg, oral, Daily, Ppx atb   • cholecalciferol, vitamin D3, 25 mcg (1,000 unit) tablet,chewable 2 tablets, oral, Daily   • cranberry extract 425 mg capsule 1 capsule, oral, Daily   • cyanocobalamin (VITAMIN B-12) 500 mcg, oral, Daily   • diclofenac sodium (VOLTAREN) 4 g, Topical, 4 times daily PRN   • digoxin (Lanoxin) 125 MCG tablet 1 tablet, oral, 3 times weekly, On Mon / Wed / Fri.   • famotidine (PEPCID) 20 mg, oral, Daily   • fLuvoxaMINE (LUVOX) 200 mg, oral, Nightly   • folic acid (FOLVITE) 1 mg,  oral, Daily   • ipratropium-albuteroL (Duo-Neb) 0.5-2.5 mg/3 mL nebulizer solution 3 mL, inhalation, Every 6 hours PRN   • Lactobacillus acidophilus 100 mg (1 billion cell) capsule 1 capsule, oral, Daily   • levothyroxine (TIROSINT) 75 mcg, oral, Daily before breakfast   • loratadine (CLARITIN) 10 mg, oral, Daily   • magnesium hydroxide (Milk of Magnesia) 400 mg/5 mL suspension 30 mL, oral, Daily PRN   • melatonin 5 mg, oral, Nightly PRN   • ondansetron (ZOFRAN) 4 mg, oral, Every 6 hours PRN   • white petrolatum-mineral oiL (Tears Naturale PM) 94-3 % ointment ophthalmic ointment 1 Application, Both Eyes, 3 times daily PRN     Physical Exam:  CONSTITUTIONAL: alert and oriented to self, confusion, no distress, cooperative, guarded  SKIN: Warm & Dry, no lesions, no rashes.  EYES: EOMI, clear sclera  ENMT: Mucous membranes moist, no apparent injury, no lesions seen. [place].   HEAD/NECK: No lymphadenopathy, thyromegaly or JVD.  HEART: Regular rate & rhythm, no murmurs, 2+ equal pulses of the extremities, Normal S1 & S2.  LUNGS: Patent airways, CTAB, normal breath sounds with good chest expansion, thorax symmetric  ABDOMEN: Nondistended, soft, slightly tender, +BS, no rebound tenderness or guarding.   EXTREMITIES: Normal extremities, no cyanosis, trace ankle edema, no contusions, no clubbing  NEUROLOGIC: intact senses, motor, response and reflexes ok, weakness    Results/Data:   Lab Results   Component Value Date    WBC 5.8 08/16/2023    HGB 13.2 08/16/2023    HCT 41.1 08/16/2023     08/16/2023    CHOL 163 12/16/2020    TRIG 112 12/16/2020    HDL 50 (L) 12/16/2020    ALT 6 (L) 08/16/2023    AST 21 08/16/2023     08/16/2023    K 4.3 08/16/2023     08/16/2023    CREATININE 1.19 (H) 08/16/2023    BUN 20 08/16/2023    CO2 26 08/16/2023    TSH 0.71 12/16/2020    INR 1.3 (H) 08/16/2023    HGBA1C 6.0 02/14/2021     Provider Impression:   Dementia with Behavioral Disturbances, Depression, Anxiety  - remain on  secure behavioral unit  - continue with fluvoxamine, buspar  - fall precautions  - monitor behaviors  - consult psych as needed    Parkinson's Disease  - continue with carbidopa-levodopa at scheduled times  - monitor    CV- Afib, HTN, CHF  - continue with Eliqus for stroke prevention  - continue with digoxin for rate control  - monitor level PRN    Recurrent UTI's  - continue with suppressive atb with keflex  - continue with cranberry capsules    Constipation Prevention  - c/w stool softeners and laxatives PRN  - having regular bowel movements    Hypothyroidism  - continue with synthroid  - monitor level PRN    GERD  - continue with pepcid    Vitamin D + B12 deficiency  - continue with supplements    Insomnia  - continue with melatonin    Code Status  - [facility]  ----------------  Written by Balbina Mclaughlin RN, acting as a scribe for Dr. Corrales. This note accurately reflects the work and decisions made by Dr. Corrales.     I, Dr. Corrales, attest all medical record entries made by the scribe were under my direction and were personally dictated by me. I have reviewed the chart and agree that the record accurately reflects my performance of the history, physical exam, and assessment and plan.       Electronically Signed By: Noni Land MD   3/10/25  3:09 PM

## 2025-03-10 NOTE — PROGRESS NOTES
Name: Shanon Smith  : 8/3/1931  MRN: [id number]  Visit Date: 2025  Chief Complaint: Monthly visit for management of chronic disease.    HPI: 89 y/o CF who was admitted to [facility] on  from home with family for long term care due to her progressive dementia with behavioral disturbances. Patient will be admitted to the secure behavioral care unit at [facility].    Subjective: Seen and examined today. Sitting up in wheelchair in common area. Pleasantly confused on exam. Offers no complaints. Nursing reports no issues.     I have reviewed nursing notes since my last visit and document any significant changes Reviewed orders, medications, Labs. Reviewed and updated Interval hx and meds reviewed. Reviewed chart looking at current medications, treatments, labs, x-rays etc.     ROS:  As above in subjective. Otherwise, all other systems have been reviewed and are negative for complaint.    Current Outpatient Medications   Medication Instructions    acetaminophen (TYLENOL) 650 mg, oral, Every 6 hours PRN    alum-mag hydroxide-simeth (Maalox MAX) 400-400-40 mg/5 mL suspension 10 mL, oral, Nightly    apixaban (ELIQUIS) 2.5 mg, oral, 2 times daily    busPIRone (BUSPAR) 5 mg, oral, 2 times daily    carbidopa-levodopa (Sinemet CR)  mg ER tablet 2 tablets, oral, 3 times daily, Do not crush, chew, or split.    cephalexin (KEFLEX) 250 mg, oral, Daily, Ppx atb    cholecalciferol, vitamin D3, 25 mcg (1,000 unit) tablet,chewable 2 tablets, oral, Daily    cranberry extract 425 mg capsule 1 capsule, oral, Daily    cyanocobalamin (VITAMIN B-12) 500 mcg, oral, Daily    diclofenac sodium (VOLTAREN) 4 g, Topical, 4 times daily PRN    digoxin (Lanoxin) 125 MCG tablet 1 tablet, oral, 3 times weekly, On Mon / Wed / Fri.    famotidine (PEPCID) 20 mg, oral, Daily    fLuvoxaMINE (LUVOX) 200 mg, oral, Nightly    folic acid (FOLVITE) 1 mg, oral, Daily    ipratropium-albuteroL (Duo-Neb) 0.5-2.5 mg/3 mL nebulizer solution 3  mL, inhalation, Every 6 hours PRN    Lactobacillus acidophilus 100 mg (1 billion cell) capsule 1 capsule, oral, Daily    levothyroxine (TIROSINT) 75 mcg, oral, Daily before breakfast    loratadine (CLARITIN) 10 mg, oral, Daily    magnesium hydroxide (Milk of Magnesia) 400 mg/5 mL suspension 30 mL, oral, Daily PRN    melatonin 5 mg, oral, Nightly PRN    ondansetron (ZOFRAN) 4 mg, oral, Every 6 hours PRN    white petrolatum-mineral oiL (Tears Naturale PM) 94-3 % ointment ophthalmic ointment 1 Application, Both Eyes, 3 times daily PRN     Physical Exam:  CONSTITUTIONAL: alert and oriented to self, confusion, no distress, cooperative, guarded  SKIN: Warm & Dry, no lesions, no rashes.  EYES: EOMI, clear sclera  ENMT: Mucous membranes moist, no apparent injury, no lesions seen. [place].   HEAD/NECK: No lymphadenopathy, thyromegaly or JVD.  HEART: Regular rate & rhythm, no murmurs, 2+ equal pulses of the extremities, Normal S1 & S2.  LUNGS: Patent airways, CTAB, normal breath sounds with good chest expansion, thorax symmetric  ABDOMEN: Nondistended, soft, slightly tender, +BS, no rebound tenderness or guarding.   EXTREMITIES: Normal extremities, no cyanosis, trace ankle edema, no contusions, no clubbing  NEUROLOGIC: intact senses, motor, response and reflexes ok, weakness    Results/Data:   Lab Results   Component Value Date    WBC 5.8 08/16/2023    HGB 13.2 08/16/2023    HCT 41.1 08/16/2023     08/16/2023    CHOL 163 12/16/2020    TRIG 112 12/16/2020    HDL 50 (L) 12/16/2020    ALT 6 (L) 08/16/2023    AST 21 08/16/2023     08/16/2023    K 4.3 08/16/2023     08/16/2023    CREATININE 1.19 (H) 08/16/2023    BUN 20 08/16/2023    CO2 26 08/16/2023    TSH 0.71 12/16/2020    INR 1.3 (H) 08/16/2023    HGBA1C 6.0 02/14/2021     Provider Impression:   Dementia with Behavioral Disturbances, Depression, Anxiety  - remain on secure behavioral unit  - continue with fluvoxamine, buspar  - fall precautions  - monitor  behaviors  - consult psych as needed    Parkinson's Disease  - continue with carbidopa-levodopa at scheduled times  - monitor    CV- Afib, HTN, CHF  - continue with Eliqus for stroke prevention  - continue with digoxin for rate control  - monitor level PRN    Recurrent UTI's  - continue with suppressive atb with keflex  - continue with cranberry capsules    Constipation Prevention  - c/w stool softeners and laxatives PRN  - having regular bowel movements    Hypothyroidism  - continue with synthroid  - monitor level PRN    GERD  - continue with pepcid    Vitamin D + B12 deficiency  - continue with supplements    Insomnia  - continue with melatonin    [facility]  [facility]  ----------------  Written by Balbina Mclaughlin RN, acting as a scribe for Dr. Corrales. This note accurately reflects the work and decisions made by Dr. Corrales.     I, Dr. Corrales, attest all medical record entries made by the scribe were under my direction and were personally dictated by me. I have reviewed the chart and agree that the record accurately reflects my performance of the history, physical exam, and assessment and plan.

## 2025-03-16 ENCOUNTER — NURSING HOME VISIT (OUTPATIENT)
Dept: POST ACUTE CARE | Facility: EXTERNAL LOCATION | Age: OVER 89
End: 2025-03-16
Payer: MEDICARE

## 2025-03-16 DIAGNOSIS — G20.A1 PARKINSON'S DISEASE, UNSPECIFIED WHETHER DYSKINESIA PRESENT, UNSPECIFIED WHETHER MANIFESTATIONS FLUCTUATE: ICD-10-CM

## 2025-03-16 DIAGNOSIS — Z00.00 ROUTINE GENERAL MEDICAL EXAMINATION AT A HEALTH CARE FACILITY: ICD-10-CM

## 2025-03-16 DIAGNOSIS — I48.91 ATRIAL FIBRILLATION, UNSPECIFIED TYPE (MULTI): ICD-10-CM

## 2025-03-16 DIAGNOSIS — E03.9 HYPOTHYROIDISM, UNSPECIFIED TYPE: ICD-10-CM

## 2025-03-16 DIAGNOSIS — F03.C3 SEVERE DEMENTIA WITH MOOD DISTURBANCE, UNSPECIFIED DEMENTIA TYPE (MULTI): ICD-10-CM

## 2025-03-16 DIAGNOSIS — I15.9 SECONDARY HYPERTENSION: ICD-10-CM

## 2025-03-16 DIAGNOSIS — F41.8 DEPRESSION WITH ANXIETY: ICD-10-CM

## 2025-03-16 DIAGNOSIS — E56.9 VITAMIN DEFICIENCY: ICD-10-CM

## 2025-03-16 DIAGNOSIS — Z78.9 NURSING HOME RESIDENT: ICD-10-CM

## 2025-03-16 PROCEDURE — 99308 SBSQ NF CARE LOW MDM 20: CPT | Performed by: INTERNAL MEDICINE

## 2025-03-16 NOTE — LETTER
Patient: Shanon Smith  : 8/3/1931    Encounter Date: 2025    Name: Shanon Smith  : 8/3/1931  MRN: [id number]  Visit Date: 3/16/2025  Chief Complaint: Monthly visit for management of chronic disease.    HPI: 89 y/o CF who was admitted to [facility] on  from home with family for long term care due to her progressive dementia with behavioral disturbances. Patient will be admitted to the secure behavioral care unit at [facility].    Subjective: Seen and examined today. Sitting up in wheelchair in common area. Pleasantly confused on exam. Offers no complaints. Nursing reports no issues.     I have reviewed nursing notes since my last visit and document any significant changes Reviewed orders, medications, Labs. Reviewed and updated Interval hx and meds reviewed. Reviewed chart looking at current medications, treatments, labs, x-rays etc.     ROS:  As above in subjective. Otherwise, all other systems have been reviewed and are negative for complaint.    Current Outpatient Medications   Medication Instructions   • acetaminophen (TYLENOL) 650 mg, oral, Every 6 hours PRN   • alum-mag hydroxide-simeth (Maalox MAX) 400-400-40 mg/5 mL suspension 10 mL, oral, Nightly   • apixaban (ELIQUIS) 2.5 mg, oral, 2 times daily   • busPIRone (BUSPAR) 5 mg, oral, 2 times daily   • carbidopa-levodopa (Sinemet CR)  mg ER tablet 2 tablets, oral, 3 times daily, Do not crush, chew, or split.   • cephalexin (KEFLEX) 250 mg, oral, Daily, Ppx atb   • cholecalciferol, vitamin D3, 25 mcg (1,000 unit) tablet,chewable 2 tablets, oral, Daily   • cranberry extract 425 mg capsule 1 capsule, oral, Daily   • cyanocobalamin (VITAMIN B-12) 500 mcg, oral, Daily   • diclofenac sodium (VOLTAREN) 4 g, Topical, 4 times daily PRN   • digoxin (Lanoxin) 125 MCG tablet 1 tablet, oral, 3 times weekly, On Mon / Wed / Fri.   • famotidine (PEPCID) 20 mg, oral, Daily   • fLuvoxaMINE (LUVOX) 200 mg, oral, Nightly   • folic acid (FOLVITE) 1 mg,  oral, Daily   • ipratropium-albuteroL (Duo-Neb) 0.5-2.5 mg/3 mL nebulizer solution 3 mL, inhalation, Every 6 hours PRN   • Lactobacillus acidophilus 100 mg (1 billion cell) capsule 1 capsule, oral, Daily   • levothyroxine (TIROSINT) 75 mcg, oral, Daily before breakfast   • loratadine (CLARITIN) 10 mg, oral, Daily   • magnesium hydroxide (Milk of Magnesia) 400 mg/5 mL suspension 30 mL, oral, Daily PRN   • melatonin 5 mg, oral, Nightly PRN   • ondansetron (ZOFRAN) 4 mg, oral, Every 6 hours PRN   • white petrolatum-mineral oiL (Tears Naturale PM) 94-3 % ointment ophthalmic ointment 1 Application, Both Eyes, 3 times daily PRN     Physical Exam:  CONSTITUTIONAL: alert and oriented to self, confusion, no distress, cooperative, guarded  SKIN: Warm & Dry, no lesions, no rashes.  EYES: EOMI, clear sclera  ENMT: Mucous membranes moist, no apparent injury, no lesions seen. [place].   HEAD/NECK: No lymphadenopathy, thyromegaly or JVD.  HEART: Regular rate & rhythm, no murmurs, 2+ equal pulses of the extremities, Normal S1 & S2.  LUNGS: Patent airways, CTAB, normal breath sounds with good chest expansion, thorax symmetric  ABDOMEN: Nondistended, soft, slightly tender, +BS, no rebound tenderness or guarding.   EXTREMITIES: Normal extremities, no cyanosis, trace ankle edema, no contusions, no clubbing  NEUROLOGIC: intact senses, motor, response and reflexes ok, weakness    Results/Data:   Lab Results   Component Value Date    WBC 5.8 08/16/2023    HGB 13.2 08/16/2023    HCT 41.1 08/16/2023     08/16/2023    CHOL 163 12/16/2020    TRIG 112 12/16/2020    HDL 50 (L) 12/16/2020    ALT 6 (L) 08/16/2023    AST 21 08/16/2023     08/16/2023    K 4.3 08/16/2023     08/16/2023    CREATININE 1.19 (H) 08/16/2023    BUN 20 08/16/2023    CO2 26 08/16/2023    TSH 0.71 12/16/2020    INR 1.3 (H) 08/16/2023    HGBA1C 6.0 02/14/2021     Provider Impression:   Dementia with Behavioral Disturbances, Depression, Anxiety  - remain on  secure behavioral unit  - continue with fluvoxamine, buspar  - fall precautions  - monitor behaviors  - consult psych as needed    Parkinson's Disease  - continue with carbidopa-levodopa at scheduled times  - monitor    CV- Afib, HTN, CHF  - continue with Eliqus for stroke prevention  - continue with digoxin for rate control  - monitor level PRN    Recurrent UTI's  - continue with suppressive atb with keflex  - continue with cranberry capsules    Constipation Prevention  - c/w stool softeners and laxatives PRN  - having regular bowel movements    Hypothyroidism  - continue with synthroid  - monitor level PRN    GERD  - continue with pepcid    Vitamin D + B12 deficiency  - continue with supplements    Insomnia  - continue with melatonin    Code Status  - [facility]  ----------------  Written by Balbina Mclaughlin RN, acting as a scribe for Dr. Corrales. This note accurately reflects the work and decisions made by Dr. Corrales.     I, Dr. Corrales, attest all medical record entries made by the scribe were under my direction and were personally dictated by me. I have reviewed the chart and agree that the record accurately reflects my performance of the history, physical exam, and assessment and plan.     Electronically Signed By: Noni Land MD   5/19/25  2:31 PM

## 2025-05-04 ENCOUNTER — NURSING HOME VISIT (OUTPATIENT)
Dept: POST ACUTE CARE | Facility: EXTERNAL LOCATION | Age: OVER 89
End: 2025-05-04
Payer: MEDICARE

## 2025-05-04 DIAGNOSIS — F41.8 DEPRESSION WITH ANXIETY: ICD-10-CM

## 2025-05-04 DIAGNOSIS — Z00.00 ROUTINE GENERAL MEDICAL EXAMINATION AT A HEALTH CARE FACILITY: ICD-10-CM

## 2025-05-04 DIAGNOSIS — E03.9 HYPOTHYROIDISM, UNSPECIFIED TYPE: ICD-10-CM

## 2025-05-04 DIAGNOSIS — F03.C3 SEVERE DEMENTIA WITH MOOD DISTURBANCE, UNSPECIFIED DEMENTIA TYPE (MULTI): ICD-10-CM

## 2025-05-04 DIAGNOSIS — I15.9 SECONDARY HYPERTENSION: ICD-10-CM

## 2025-05-04 DIAGNOSIS — G20.A1 PARKINSON'S DISEASE, UNSPECIFIED WHETHER DYSKINESIA PRESENT, UNSPECIFIED WHETHER MANIFESTATIONS FLUCTUATE: ICD-10-CM

## 2025-05-04 DIAGNOSIS — Z78.9 NURSING HOME RESIDENT: ICD-10-CM

## 2025-05-04 DIAGNOSIS — I48.91 ATRIAL FIBRILLATION, UNSPECIFIED TYPE (MULTI): ICD-10-CM

## 2025-05-04 PROCEDURE — 99308 SBSQ NF CARE LOW MDM 20: CPT | Performed by: INTERNAL MEDICINE

## 2025-05-04 NOTE — LETTER
Patient: Shanon Smith  : 8/3/1931    Encounter Date: 2025    Name: Shanon Smith  : 8/3/1931  MRN: [id number]  Visit Date: 2025  Chief Complaint: Monthly visit for management of chronic disease.    HPI: 89 y/o CF who was admitted to [facility] on  from home with family for long term care due to her progressive dementia with behavioral disturbances. Patient will be admitted to the secure behavioral care unit at [facility].    Subjective: Seen and examined today. Sitting up in wheelchair in common area. Pleasantly confused on exam. Offers no complaints. Nursing reports no issues.     I have reviewed nursing notes since my last visit and document any significant changes Reviewed orders, medications, Labs. Reviewed and updated Interval hx and meds reviewed. Reviewed chart looking at current medications, treatments, labs, x-rays etc.     ROS:  As above in subjective. Otherwise, all other systems have been reviewed and are negative for complaint.    Current Outpatient Medications   Medication Instructions   • acetaminophen (TYLENOL) 650 mg, Every 6 hours PRN   • acetaminophen (TYLENOL) 650 mg, oral, Every 6 hours scheduled   • alum-mag hydroxide-simeth (Maalox MAX) 400-400-40 mg/5 mL suspension 10 mL, Nightly   • apixaban (ELIQUIS) 2.5 mg, 2 times daily   • bisacodyl (DULCOLAX) 5 mg, oral, Daily PRN, Do not crush, chew, or split.   • busPIRone (BUSPAR) 5 mg, 2 times daily   • carbidopa-levodopa (Sinemet CR)  mg ER tablet 2 tablets, 3 times daily   • cephalexin (Keflex) 250 mg capsule Take 1 capsule (250 mg) by mouth once daily. Ppx atb Do not fill before 2025.( To e started after completing cephalexin 500 mg)   • cholecalciferol, vitamin D3, 25 mcg (1,000 unit) tablet,chewable 2 tablets, Daily   • cranberry extract 425 mg capsule 1 capsule, Daily   • cyanocobalamin (VITAMIN B-12) 500 mcg, Daily   • digoxin (Lanoxin) 125 MCG tablet 1 tablet, 4 times weekly   • docusate sodium  (COLACE) 100 mg, oral, 2 times daily   • famotidine (PEPCID) 20 mg, Daily   • fLuvoxaMINE (LUVOX) 200 mg, Nightly   • folic acid (FOLVITE) 1 mg, Daily   • Lactobacillus acidophilus 100 mg (1 billion cell) capsule 1 capsule, Daily   • levothyroxine (TIROSINT) 75 mcg, Daily before breakfast   • lubricating eye drops ophthalmic solution 1 drop, 3 times daily   • magnesium hydroxide (Milk of Magnesia) 400 mg/5 mL suspension 30 mL, Daily PRN   • melatonin 5 mg, Nightly PRN   • ondansetron (ZOFRAN) 4 mg, Every 6 hours PRN   • QUEtiapine (SEROQUEL) 25 mg, oral, Nightly   • white petrolatum-mineral oiL (Tears Naturale PM) 94-3 % ointment ophthalmic ointment 1 Application, 3 times daily PRN     Physical Exam:  CONSTITUTIONAL: alert and oriented to self, confusion, no distress, cooperative, guarded  SKIN: Warm & Dry, no lesions, no rashes.  EYES: EOMI, clear sclera  ENMT: Mucous membranes moist, no apparent injury, no lesions seen. [place].   HEAD/NECK: No lymphadenopathy, thyromegaly or JVD.  HEART: Regular rate & rhythm, no murmurs, 2+ equal pulses of the extremities, Normal S1 & S2.  LUNGS: Patent airways, CTAB, normal breath sounds with good chest expansion, thorax symmetric  ABDOMEN: Nondistended, soft, slightly tender, +BS, no rebound tenderness or guarding.   EXTREMITIES: Normal extremities, no cyanosis, trace ankle edema, no contusions, no clubbing  NEUROLOGIC: intact senses, motor, response and reflexes ok, weakness    Results/Data:   Lab Results   Component Value Date    WBC 4.8 07/02/2025    HGB 8.3 (L) 07/02/2025    HCT 25.0 (L) 07/02/2025     (L) 07/02/2025    CHOL 163 12/16/2020    TRIG 112 12/16/2020    HDL 50 (L) 12/16/2020    ALT 3 (L) 06/27/2025    AST 15 06/27/2025     (L) 07/01/2025    K 4.0 07/01/2025    CL 99 07/01/2025    CREATININE 1.34 (H) 07/01/2025    BUN 22 07/01/2025    CO2 29 07/01/2025    TSH 0.71 12/16/2020    INR 1.3 (H) 06/27/2025    HGBA1C 6.0 02/14/2021     Provider Impression:    Dementia with Behavioral Disturbances, Depression, Anxiety  - remain on secure behavioral unit  - continue with fluvoxamine, buspar  - fall precautions  - monitor behaviors  - consult psych as needed    Parkinson's Disease  - continue with carbidopa-levodopa at scheduled times  - monitor    CV- Afib, HTN, CHF  - continue with Eliqus for stroke prevention  - continue with digoxin for rate control  - monitor level PRN    Recurrent UTI's  - continue with suppressive atb with keflex  - continue with cranberry capsules    Constipation Prevention  - c/w stool softeners and laxatives PRN  - having regular bowel movements    Hypothyroidism  - continue with synthroid  - monitor level PRN    GERD  - continue with pepcid    Vitamin D + B12 deficiency  - continue with supplements    Insomnia  - continue with melatonin    Code Status  - [facility]  ----------------  Written by Balbina Mclaughlin RN, acting as a scribe for Dr. Corrales. This note accurately reflects the work and decisions made by Dr. Corrales.     I, Dr. Corrales, attest all medical record entries made by the scribe were under my direction and were personally dictated by me. I have reviewed the chart and agree that the record accurately reflects my performance of the history, physical exam, and assessment and plan.     Electronically Signed By: Noni Land MD   7/28/25  1:43 PM

## 2025-05-19 NOTE — PROGRESS NOTES
Name: Shanon Smith  : 8/3/1931  MRN: [id number]  Visit Date: 3/16/2025  Chief Complaint: Monthly visit for management of chronic disease.    HPI: 89 y/o CF who was admitted to [facility] on  from home with family for long term care due to her progressive dementia with behavioral disturbances. Patient will be admitted to the secure behavioral care unit at [facility].    Subjective: Seen and examined today. Sitting up in wheelchair in common area. Pleasantly confused on exam. Offers no complaints. Nursing reports no issues.     I have reviewed nursing notes since my last visit and document any significant changes Reviewed orders, medications, Labs. Reviewed and updated Interval hx and meds reviewed. Reviewed chart looking at current medications, treatments, labs, x-rays etc.     ROS:  As above in subjective. Otherwise, all other systems have been reviewed and are negative for complaint.    Current Outpatient Medications   Medication Instructions    acetaminophen (TYLENOL) 650 mg, oral, Every 6 hours PRN    alum-mag hydroxide-simeth (Maalox MAX) 400-400-40 mg/5 mL suspension 10 mL, oral, Nightly    apixaban (ELIQUIS) 2.5 mg, oral, 2 times daily    busPIRone (BUSPAR) 5 mg, oral, 2 times daily    carbidopa-levodopa (Sinemet CR)  mg ER tablet 2 tablets, oral, 3 times daily, Do not crush, chew, or split.    cephalexin (KEFLEX) 250 mg, oral, Daily, Ppx atb    cholecalciferol, vitamin D3, 25 mcg (1,000 unit) tablet,chewable 2 tablets, oral, Daily    cranberry extract 425 mg capsule 1 capsule, oral, Daily    cyanocobalamin (VITAMIN B-12) 500 mcg, oral, Daily    diclofenac sodium (VOLTAREN) 4 g, Topical, 4 times daily PRN    digoxin (Lanoxin) 125 MCG tablet 1 tablet, oral, 3 times weekly, On Mon / Wed / Fri.    famotidine (PEPCID) 20 mg, oral, Daily    fLuvoxaMINE (LUVOX) 200 mg, oral, Nightly    folic acid (FOLVITE) 1 mg, oral, Daily    ipratropium-albuteroL (Duo-Neb) 0.5-2.5 mg/3 mL nebulizer solution 3  mL, inhalation, Every 6 hours PRN    Lactobacillus acidophilus 100 mg (1 billion cell) capsule 1 capsule, oral, Daily    levothyroxine (TIROSINT) 75 mcg, oral, Daily before breakfast    loratadine (CLARITIN) 10 mg, oral, Daily    magnesium hydroxide (Milk of Magnesia) 400 mg/5 mL suspension 30 mL, oral, Daily PRN    melatonin 5 mg, oral, Nightly PRN    ondansetron (ZOFRAN) 4 mg, oral, Every 6 hours PRN    white petrolatum-mineral oiL (Tears Naturale PM) 94-3 % ointment ophthalmic ointment 1 Application, Both Eyes, 3 times daily PRN     Physical Exam:  CONSTITUTIONAL: alert and oriented to self, confusion, no distress, cooperative, guarded  SKIN: Warm & Dry, no lesions, no rashes.  EYES: EOMI, clear sclera  ENMT: Mucous membranes moist, no apparent injury, no lesions seen. [place].   HEAD/NECK: No lymphadenopathy, thyromegaly or JVD.  HEART: Regular rate & rhythm, no murmurs, 2+ equal pulses of the extremities, Normal S1 & S2.  LUNGS: Patent airways, CTAB, normal breath sounds with good chest expansion, thorax symmetric  ABDOMEN: Nondistended, soft, slightly tender, +BS, no rebound tenderness or guarding.   EXTREMITIES: Normal extremities, no cyanosis, trace ankle edema, no contusions, no clubbing  NEUROLOGIC: intact senses, motor, response and reflexes ok, weakness    Results/Data:   Lab Results   Component Value Date    WBC 5.8 08/16/2023    HGB 13.2 08/16/2023    HCT 41.1 08/16/2023     08/16/2023    CHOL 163 12/16/2020    TRIG 112 12/16/2020    HDL 50 (L) 12/16/2020    ALT 6 (L) 08/16/2023    AST 21 08/16/2023     08/16/2023    K 4.3 08/16/2023     08/16/2023    CREATININE 1.19 (H) 08/16/2023    BUN 20 08/16/2023    CO2 26 08/16/2023    TSH 0.71 12/16/2020    INR 1.3 (H) 08/16/2023    HGBA1C 6.0 02/14/2021     Provider Impression:   Dementia with Behavioral Disturbances, Depression, Anxiety  - remain on secure behavioral unit  - continue with fluvoxamine, buspar  - fall precautions  - monitor  behaviors  - consult psych as needed    Parkinson's Disease  - continue with carbidopa-levodopa at scheduled times  - monitor    CV- Afib, HTN, CHF  - continue with Eliqus for stroke prevention  - continue with digoxin for rate control  - monitor level PRN    Recurrent UTI's  - continue with suppressive atb with keflex  - continue with cranberry capsules    Constipation Prevention  - c/w stool softeners and laxatives PRN  - having regular bowel movements    Hypothyroidism  - continue with synthroid  - monitor level PRN    GERD  - continue with pepcid    Vitamin D + B12 deficiency  - continue with supplements    Insomnia  - continue with melatonin    [facility]  ----------------  Written by Balbina Mclaughlin RN, acting as a scribe for Dr. Corrales. This note accurately reflects the work and decisions made by Dr. Corrales.     I, Dr. Corrales, attest all medical record entries made by the scribe were under my direction and were personally dictated by me. I have reviewed the chart and agree that the record accurately reflects my performance of the history, physical exam, and assessment and plan.

## 2025-06-27 ENCOUNTER — APPOINTMENT (OUTPATIENT)
Dept: RADIOLOGY | Facility: HOSPITAL | Age: OVER 89
End: 2025-06-27
Payer: MEDICARE

## 2025-06-27 ENCOUNTER — HOSPITAL ENCOUNTER (INPATIENT)
Facility: HOSPITAL | Age: OVER 89
End: 2025-06-27
Attending: STUDENT IN AN ORGANIZED HEALTH CARE EDUCATION/TRAINING PROGRAM | Admitting: INTERNAL MEDICINE
Payer: MEDICARE

## 2025-06-27 DIAGNOSIS — F02.C3 SEVERE DEMENTIA DUE TO PARKINSON'S DISEASE, WITH MOOD DISTURBANCE (MULTI): ICD-10-CM

## 2025-06-27 DIAGNOSIS — F02.80 DEMENTIA DUE TO PARKINSON'S DISEASE WITHOUT BEHAVIORAL DISTURBANCE (MULTI): ICD-10-CM

## 2025-06-27 DIAGNOSIS — G20.A1 DEMENTIA DUE TO PARKINSON'S DISEASE WITHOUT BEHAVIORAL DISTURBANCE (MULTI): ICD-10-CM

## 2025-06-27 DIAGNOSIS — N30.00 ACUTE CYSTITIS WITHOUT HEMATURIA: ICD-10-CM

## 2025-06-27 DIAGNOSIS — G20.A1 SEVERE DEMENTIA DUE TO PARKINSON'S DISEASE, WITH MOOD DISTURBANCE (MULTI): ICD-10-CM

## 2025-06-27 DIAGNOSIS — S72.142A CLOSED INTERTROCHANTERIC FRACTURE, LEFT, INITIAL ENCOUNTER: Primary | ICD-10-CM

## 2025-06-27 LAB
ABO GROUP (TYPE) IN BLOOD: NORMAL
ALBUMIN SERPL BCP-MCNC: 3.7 G/DL (ref 3.4–5)
ALP SERPL-CCNC: 100 U/L (ref 33–136)
ALT SERPL W P-5'-P-CCNC: 3 U/L (ref 7–45)
ANION GAP SERPL CALC-SCNC: 12 MMOL/L (ref 10–20)
ANTIBODY SCREEN: NORMAL
APTT PPP: 32 SECONDS (ref 26–36)
AST SERPL W P-5'-P-CCNC: 15 U/L (ref 9–39)
BASOPHILS # BLD AUTO: 0.04 X10*3/UL (ref 0–0.1)
BASOPHILS NFR BLD AUTO: 0.3 %
BILIRUB SERPL-MCNC: 0.4 MG/DL (ref 0–1.2)
BUN SERPL-MCNC: 21 MG/DL (ref 6–23)
CALCIUM SERPL-MCNC: 8.9 MG/DL (ref 8.6–10.3)
CHLORIDE SERPL-SCNC: 99 MMOL/L (ref 98–107)
CO2 SERPL-SCNC: 29 MMOL/L (ref 21–32)
CREAT SERPL-MCNC: 1.39 MG/DL (ref 0.5–1.05)
EGFRCR SERPLBLD CKD-EPI 2021: 35 ML/MIN/1.73M*2
EOSINOPHIL # BLD AUTO: 0.08 X10*3/UL (ref 0–0.4)
EOSINOPHIL NFR BLD AUTO: 0.7 %
ERYTHROCYTE [DISTWIDTH] IN BLOOD BY AUTOMATED COUNT: 13.5 % (ref 11.5–14.5)
GLUCOSE SERPL-MCNC: 139 MG/DL (ref 74–99)
HCT VFR BLD AUTO: 37.5 % (ref 36–46)
HGB BLD-MCNC: 12.8 G/DL (ref 12–16)
IMM GRANULOCYTES # BLD AUTO: 0.09 X10*3/UL (ref 0–0.5)
IMM GRANULOCYTES NFR BLD AUTO: 0.8 % (ref 0–0.9)
INR PPP: 1.3 (ref 0.9–1.1)
LYMPHOCYTES # BLD AUTO: 1 X10*3/UL (ref 0.8–3)
LYMPHOCYTES NFR BLD AUTO: 8.7 %
MAGNESIUM SERPL-MCNC: 1.95 MG/DL (ref 1.6–2.4)
MCH RBC QN AUTO: 31.3 PG (ref 26–34)
MCHC RBC AUTO-ENTMCNC: 34.1 G/DL (ref 32–36)
MCV RBC AUTO: 92 FL (ref 80–100)
MONOCYTES # BLD AUTO: 0.73 X10*3/UL (ref 0.05–0.8)
MONOCYTES NFR BLD AUTO: 6.3 %
NEUTROPHILS # BLD AUTO: 9.6 X10*3/UL (ref 1.6–5.5)
NEUTROPHILS NFR BLD AUTO: 83.2 %
NRBC BLD-RTO: 0 /100 WBCS (ref 0–0)
PLATELET # BLD AUTO: 170 X10*3/UL (ref 150–450)
POTASSIUM SERPL-SCNC: 3.8 MMOL/L (ref 3.5–5.3)
PROT SERPL-MCNC: 6.3 G/DL (ref 6.4–8.2)
PROTHROMBIN TIME: 14.9 SECONDS (ref 9.8–12.4)
RBC # BLD AUTO: 4.09 X10*6/UL (ref 4–5.2)
RH FACTOR (ANTIGEN D): NORMAL
SODIUM SERPL-SCNC: 136 MMOL/L (ref 136–145)
WBC # BLD AUTO: 11.5 X10*3/UL (ref 4.4–11.3)

## 2025-06-27 PROCEDURE — 85025 COMPLETE CBC W/AUTO DIFF WBC: CPT

## 2025-06-27 PROCEDURE — 2500000004 HC RX 250 GENERAL PHARMACY W/ HCPCS (ALT 636 FOR OP/ED)

## 2025-06-27 PROCEDURE — 85730 THROMBOPLASTIN TIME PARTIAL: CPT

## 2025-06-27 PROCEDURE — 96374 THER/PROPH/DIAG INJ IV PUSH: CPT

## 2025-06-27 PROCEDURE — 73700 CT LOWER EXTREMITY W/O DYE: CPT | Mod: LEFT SIDE | Performed by: RADIOLOGY

## 2025-06-27 PROCEDURE — 86923 COMPATIBILITY TEST ELECTRIC: CPT

## 2025-06-27 PROCEDURE — 71045 X-RAY EXAM CHEST 1 VIEW: CPT | Performed by: RADIOLOGY

## 2025-06-27 PROCEDURE — 2500000004 HC RX 250 GENERAL PHARMACY W/ HCPCS (ALT 636 FOR OP/ED): Performed by: STUDENT IN AN ORGANIZED HEALTH CARE EDUCATION/TRAINING PROGRAM

## 2025-06-27 PROCEDURE — 96376 TX/PRO/DX INJ SAME DRUG ADON: CPT

## 2025-06-27 PROCEDURE — 72170 X-RAY EXAM OF PELVIS: CPT

## 2025-06-27 PROCEDURE — 80053 COMPREHEN METABOLIC PANEL: CPT

## 2025-06-27 PROCEDURE — 1200000002 HC GENERAL ROOM WITH TELEMETRY DAILY

## 2025-06-27 PROCEDURE — 71045 X-RAY EXAM CHEST 1 VIEW: CPT

## 2025-06-27 PROCEDURE — 99223 1ST HOSP IP/OBS HIGH 75: CPT | Performed by: INTERNAL MEDICINE

## 2025-06-27 PROCEDURE — 99285 EMERGENCY DEPT VISIT HI MDM: CPT | Performed by: STUDENT IN AN ORGANIZED HEALTH CARE EDUCATION/TRAINING PROGRAM

## 2025-06-27 PROCEDURE — 73552 X-RAY EXAM OF FEMUR 2/>: CPT | Mod: LT

## 2025-06-27 PROCEDURE — 72170 X-RAY EXAM OF PELVIS: CPT | Performed by: RADIOLOGY

## 2025-06-27 PROCEDURE — 96375 TX/PRO/DX INJ NEW DRUG ADDON: CPT

## 2025-06-27 PROCEDURE — 73552 X-RAY EXAM OF FEMUR 2/>: CPT | Performed by: RADIOLOGY

## 2025-06-27 PROCEDURE — 85610 PROTHROMBIN TIME: CPT

## 2025-06-27 PROCEDURE — 83735 ASSAY OF MAGNESIUM: CPT

## 2025-06-27 PROCEDURE — 73700 CT LOWER EXTREMITY W/O DYE: CPT | Mod: LT

## 2025-06-27 PROCEDURE — 86901 BLOOD TYPING SEROLOGIC RH(D): CPT

## 2025-06-27 PROCEDURE — 36415 COLL VENOUS BLD VENIPUNCTURE: CPT

## 2025-06-27 RX ORDER — ACETAMINOPHEN 325 MG/1
650 TABLET ORAL EVERY 6 HOURS PRN
Status: ACTIVE | OUTPATIENT
Start: 2025-06-27

## 2025-06-27 RX ORDER — FAMOTIDINE 20 MG/1
20 TABLET, FILM COATED ORAL DAILY
Status: DISPENSED | OUTPATIENT
Start: 2025-06-28

## 2025-06-27 RX ORDER — FLUVOXAMINE MALEATE 50 MG/1
200 TABLET, FILM COATED ORAL NIGHTLY
Status: DISCONTINUED | OUTPATIENT
Start: 2025-06-27 | End: 2025-06-29

## 2025-06-27 RX ORDER — MORPHINE SULFATE 2 MG/ML
2 INJECTION, SOLUTION INTRAMUSCULAR; INTRAVENOUS EVERY 4 HOURS PRN
Status: DISCONTINUED | OUTPATIENT
Start: 2025-06-27 | End: 2025-06-28

## 2025-06-27 RX ORDER — GUAIFENESIN/DEXTROMETHORPHAN 100-10MG/5
5 SYRUP ORAL EVERY 4 HOURS PRN
Status: ACTIVE | OUTPATIENT
Start: 2025-06-27

## 2025-06-27 RX ORDER — FOLIC ACID 1 MG/1
1 TABLET ORAL DAILY
Status: DISPENSED | OUTPATIENT
Start: 2025-06-28

## 2025-06-27 RX ORDER — LEVOTHYROXINE SODIUM 75 UG/1
75 TABLET ORAL DAILY
Status: DISPENSED | OUTPATIENT
Start: 2025-06-28

## 2025-06-27 RX ORDER — CHOLECALCIFEROL (VITAMIN D3) 25 MCG
2000 TABLET ORAL DAILY
Status: DISPENSED | OUTPATIENT
Start: 2025-06-28

## 2025-06-27 RX ORDER — ACETAMINOPHEN 500 MG
5 TABLET ORAL NIGHTLY PRN
Status: DISPENSED | OUTPATIENT
Start: 2025-06-27

## 2025-06-27 RX ORDER — IPRATROPIUM BROMIDE AND ALBUTEROL SULFATE 2.5; .5 MG/3ML; MG/3ML
3 SOLUTION RESPIRATORY (INHALATION) EVERY 6 HOURS PRN
Status: DISCONTINUED | OUTPATIENT
Start: 2025-06-27 | End: 2025-06-28

## 2025-06-27 RX ORDER — ACETAMINOPHEN 325 MG/1
650 TABLET ORAL EVERY 4 HOURS PRN
Status: ACTIVE | OUTPATIENT
Start: 2025-06-27

## 2025-06-27 RX ORDER — GUAIFENESIN 600 MG/1
600 TABLET, EXTENDED RELEASE ORAL EVERY 12 HOURS PRN
Status: ACTIVE | OUTPATIENT
Start: 2025-06-27

## 2025-06-27 RX ORDER — VIT C/E/ZN/COPPR/LUTEIN/ZEAXAN 250MG-90MG
500 CAPSULE ORAL DAILY
Status: DISPENSED | OUTPATIENT
Start: 2025-06-28

## 2025-06-27 RX ORDER — CALCIUM CARBONATE 200(500)MG
500 TABLET,CHEWABLE ORAL 4 TIMES DAILY PRN
Status: ACTIVE | OUTPATIENT
Start: 2025-06-27

## 2025-06-27 RX ORDER — ADHESIVE BANDAGE
30 BANDAGE TOPICAL DAILY PRN
Status: ACTIVE | OUTPATIENT
Start: 2025-06-27

## 2025-06-27 RX ORDER — CEPHALEXIN 250 MG/1
250 CAPSULE ORAL DAILY
Status: DISPENSED | OUTPATIENT
Start: 2025-06-28

## 2025-06-27 RX ORDER — ACETAMINOPHEN 650 MG/1
650 SUPPOSITORY RECTAL EVERY 4 HOURS PRN
Status: ACTIVE | OUTPATIENT
Start: 2025-06-27

## 2025-06-27 RX ORDER — CETIRIZINE HYDROCHLORIDE 10 MG/1
10 TABLET ORAL DAILY
Status: DISPENSED | OUTPATIENT
Start: 2025-06-28

## 2025-06-27 RX ORDER — ACETAMINOPHEN 160 MG/5ML
650 SOLUTION ORAL EVERY 4 HOURS PRN
Status: ACTIVE | OUTPATIENT
Start: 2025-06-27

## 2025-06-27 RX ORDER — ALUMINUM HYDROXIDE, MAGNESIUM HYDROXIDE, AND SIMETHICONE 1200; 120; 1200 MG/30ML; MG/30ML; MG/30ML
10 SUSPENSION ORAL NIGHTLY
Status: DISPENSED | OUTPATIENT
Start: 2025-06-27

## 2025-06-27 RX ORDER — ONDANSETRON 4 MG/1
4 TABLET, FILM COATED ORAL EVERY 8 HOURS PRN
Status: ACTIVE | OUTPATIENT
Start: 2025-06-27

## 2025-06-27 RX ORDER — ONDANSETRON HYDROCHLORIDE 2 MG/ML
4 INJECTION, SOLUTION INTRAVENOUS EVERY 8 HOURS PRN
Status: DISPENSED | OUTPATIENT
Start: 2025-06-27

## 2025-06-27 RX ORDER — DOCUSATE SODIUM 100 MG/1
100 CAPSULE, LIQUID FILLED ORAL 2 TIMES DAILY
Status: DISPENSED | OUTPATIENT
Start: 2025-06-27

## 2025-06-27 RX ORDER — ONDANSETRON HYDROCHLORIDE 2 MG/ML
4 INJECTION, SOLUTION INTRAVENOUS ONCE
Status: COMPLETED | OUTPATIENT
Start: 2025-06-27 | End: 2025-06-27

## 2025-06-27 RX ORDER — DIGOXIN 125 MCG
125 TABLET ORAL 3 TIMES WEEKLY
Status: DISCONTINUED | OUTPATIENT
Start: 2025-06-30 | End: 2025-06-29

## 2025-06-27 RX ORDER — CARBIDOPA AND LEVODOPA 25; 100 MG/1; MG/1
2 TABLET, EXTENDED RELEASE ORAL 3 TIMES DAILY
Status: DISCONTINUED | OUTPATIENT
Start: 2025-06-27 | End: 2025-06-29

## 2025-06-27 RX ORDER — BUSPIRONE HYDROCHLORIDE 5 MG/1
5 TABLET ORAL 2 TIMES DAILY
Status: DISCONTINUED | OUTPATIENT
Start: 2025-06-27 | End: 2025-06-29

## 2025-06-27 RX ADMIN — ONDANSETRON 4 MG: 2 INJECTION, SOLUTION INTRAMUSCULAR; INTRAVENOUS at 22:12

## 2025-06-27 RX ADMIN — ONDANSETRON 4 MG: 2 INJECTION, SOLUTION INTRAMUSCULAR; INTRAVENOUS at 20:57

## 2025-06-27 RX ADMIN — HYDROMORPHONE HYDROCHLORIDE 0.5 MG: 1 INJECTION, SOLUTION INTRAMUSCULAR; INTRAVENOUS; SUBCUTANEOUS at 20:57

## 2025-06-27 ASSESSMENT — PAIN DESCRIPTION - PAIN TYPE: TYPE: ACUTE PAIN

## 2025-06-27 ASSESSMENT — LIFESTYLE VARIABLES
HAVE YOU EVER FELT YOU SHOULD CUT DOWN ON YOUR DRINKING: NO
HAVE PEOPLE ANNOYED YOU BY CRITICIZING YOUR DRINKING: NO
EVER HAD A DRINK FIRST THING IN THE MORNING TO STEADY YOUR NERVES TO GET RID OF A HANGOVER: NO
EVER FELT BAD OR GUILTY ABOUT YOUR DRINKING: NO
TOTAL SCORE: 0

## 2025-06-27 ASSESSMENT — PAIN - FUNCTIONAL ASSESSMENT: PAIN_FUNCTIONAL_ASSESSMENT: WONG-BAKER FACES

## 2025-06-27 ASSESSMENT — PAIN DESCRIPTION - ORIENTATION: ORIENTATION: LEFT

## 2025-06-27 ASSESSMENT — PAIN DESCRIPTION - LOCATION: LOCATION: HIP

## 2025-06-27 ASSESSMENT — PAIN SCALES - WONG BAKER: WONGBAKER_NUMERICALRESPONSE: HURTS EVEN MORE

## 2025-06-27 ASSESSMENT — PAIN SCALES - GENERAL: PAINLEVEL_OUTOF10: 2

## 2025-06-28 ENCOUNTER — APPOINTMENT (OUTPATIENT)
Dept: RADIOLOGY | Facility: HOSPITAL | Age: OVER 89
End: 2025-06-28
Payer: MEDICARE

## 2025-06-28 ENCOUNTER — APPOINTMENT (OUTPATIENT)
Dept: CARDIOLOGY | Facility: HOSPITAL | Age: OVER 89
End: 2025-06-28
Payer: MEDICARE

## 2025-06-28 ENCOUNTER — ANESTHESIA (OUTPATIENT)
Dept: OPERATING ROOM | Facility: HOSPITAL | Age: OVER 89
End: 2025-06-28
Payer: MEDICARE

## 2025-06-28 ENCOUNTER — ANESTHESIA EVENT (OUTPATIENT)
Dept: OPERATING ROOM | Facility: HOSPITAL | Age: OVER 89
End: 2025-06-28
Payer: MEDICARE

## 2025-06-28 LAB
ANION GAP SERPL CALC-SCNC: 12 MMOL/L (ref 10–20)
BNP SERPL-MCNC: 295 PG/ML (ref 0–99)
BUN SERPL-MCNC: 23 MG/DL (ref 6–23)
CALCIUM SERPL-MCNC: 8.9 MG/DL (ref 8.6–10.3)
CARDIAC TROPONIN I PNL SERPL HS: 26 NG/L (ref 0–13)
CARDIAC TROPONIN I PNL SERPL HS: 29 NG/L (ref 0–13)
CHLORIDE SERPL-SCNC: 100 MMOL/L (ref 98–107)
CO2 SERPL-SCNC: 27 MMOL/L (ref 21–32)
CREAT SERPL-MCNC: 1.3 MG/DL (ref 0.5–1.05)
EGFRCR SERPLBLD CKD-EPI 2021: 38 ML/MIN/1.73M*2
ERYTHROCYTE [DISTWIDTH] IN BLOOD BY AUTOMATED COUNT: 13.3 % (ref 11.5–14.5)
GLUCOSE SERPL-MCNC: 153 MG/DL (ref 74–99)
HCT VFR BLD AUTO: 34.4 % (ref 36–46)
HGB BLD-MCNC: 12.3 G/DL (ref 12–16)
MCH RBC QN AUTO: 31.2 PG (ref 26–34)
MCHC RBC AUTO-ENTMCNC: 35.8 G/DL (ref 32–36)
MCV RBC AUTO: 87 FL (ref 80–100)
NRBC BLD-RTO: 0 /100 WBCS (ref 0–0)
PLATELET # BLD AUTO: 158 X10*3/UL (ref 150–450)
POTASSIUM SERPL-SCNC: 4.5 MMOL/L (ref 3.5–5.3)
RBC # BLD AUTO: 3.94 X10*6/UL (ref 4–5.2)
SODIUM SERPL-SCNC: 134 MMOL/L (ref 136–145)
WBC # BLD AUTO: 9.9 X10*3/UL (ref 4.4–11.3)

## 2025-06-28 PROCEDURE — BQ11ZZZ FLUOROSCOPY OF LEFT HIP: ICD-10-PCS | Performed by: ORTHOPAEDIC SURGERY

## 2025-06-28 PROCEDURE — C1713 ANCHOR/SCREW BN/BN,TIS/BN: HCPCS | Performed by: ORTHOPAEDIC SURGERY

## 2025-06-28 PROCEDURE — 36415 COLL VENOUS BLD VENIPUNCTURE: CPT | Performed by: HOSPITALIST

## 2025-06-28 PROCEDURE — 7100000001 HC RECOVERY ROOM TIME - INITIAL BASE CHARGE: Performed by: ORTHOPAEDIC SURGERY

## 2025-06-28 PROCEDURE — 36415 COLL VENOUS BLD VENIPUNCTURE: CPT | Performed by: INTERNAL MEDICINE

## 2025-06-28 PROCEDURE — 99232 SBSQ HOSP IP/OBS MODERATE 35: CPT | Performed by: HOSPITALIST

## 2025-06-28 PROCEDURE — 3600000009 HC OR TIME - EACH INCREMENTAL 1 MINUTE - PROCEDURE LEVEL FOUR: Performed by: ORTHOPAEDIC SURGERY

## 2025-06-28 PROCEDURE — 84484 ASSAY OF TROPONIN QUANT: CPT | Performed by: HOSPITALIST

## 2025-06-28 PROCEDURE — 3700000002 HC GENERAL ANESTHESIA TIME - EACH INCREMENTAL 1 MINUTE: Performed by: ORTHOPAEDIC SURGERY

## 2025-06-28 PROCEDURE — 2500000004 HC RX 250 GENERAL PHARMACY W/ HCPCS (ALT 636 FOR OP/ED): Performed by: STUDENT IN AN ORGANIZED HEALTH CARE EDUCATION/TRAINING PROGRAM

## 2025-06-28 PROCEDURE — 2500000001 HC RX 250 WO HCPCS SELF ADMINISTERED DRUGS (ALT 637 FOR MEDICARE OP): Performed by: ORTHOPAEDIC SURGERY

## 2025-06-28 PROCEDURE — 2500000005 HC RX 250 GENERAL PHARMACY W/O HCPCS: Performed by: STUDENT IN AN ORGANIZED HEALTH CARE EDUCATION/TRAINING PROGRAM

## 2025-06-28 PROCEDURE — 2720000007 HC OR 272 NO HCPCS: Performed by: ORTHOPAEDIC SURGERY

## 2025-06-28 PROCEDURE — 76000 FLUOROSCOPY <1 HR PHYS/QHP: CPT

## 2025-06-28 PROCEDURE — 83880 ASSAY OF NATRIURETIC PEPTIDE: CPT | Performed by: INTERNAL MEDICINE

## 2025-06-28 PROCEDURE — 2500000004 HC RX 250 GENERAL PHARMACY W/ HCPCS (ALT 636 FOR OP/ED): Performed by: INTERNAL MEDICINE

## 2025-06-28 PROCEDURE — 84484 ASSAY OF TROPONIN QUANT: CPT | Performed by: INTERNAL MEDICINE

## 2025-06-28 PROCEDURE — 1200000002 HC GENERAL ROOM WITH TELEMETRY DAILY

## 2025-06-28 PROCEDURE — 2500000004 HC RX 250 GENERAL PHARMACY W/ HCPCS (ALT 636 FOR OP/ED): Performed by: ORTHOPAEDIC SURGERY

## 2025-06-28 PROCEDURE — 93005 ELECTROCARDIOGRAM TRACING: CPT

## 2025-06-28 PROCEDURE — 27245 TREAT THIGH FRACTURE: CPT | Performed by: ORTHOPAEDIC SURGERY

## 2025-06-28 PROCEDURE — 2500000005 HC RX 250 GENERAL PHARMACY W/O HCPCS: Performed by: ORTHOPAEDIC SURGERY

## 2025-06-28 PROCEDURE — 80048 BASIC METABOLIC PNL TOTAL CA: CPT | Performed by: INTERNAL MEDICINE

## 2025-06-28 PROCEDURE — 99223 1ST HOSP IP/OBS HIGH 75: CPT | Performed by: ORTHOPAEDIC SURGERY

## 2025-06-28 PROCEDURE — 2500000001 HC RX 250 WO HCPCS SELF ADMINISTERED DRUGS (ALT 637 FOR MEDICARE OP): Performed by: INTERNAL MEDICINE

## 2025-06-28 PROCEDURE — 3600000004 HC OR TIME - INITIAL BASE CHARGE - PROCEDURE LEVEL FOUR: Performed by: ORTHOPAEDIC SURGERY

## 2025-06-28 PROCEDURE — 2780000003 HC OR 278 NO HCPCS: Performed by: ORTHOPAEDIC SURGERY

## 2025-06-28 PROCEDURE — 2500000004 HC RX 250 GENERAL PHARMACY W/ HCPCS (ALT 636 FOR OP/ED): Mod: JZ | Performed by: INTERNAL MEDICINE

## 2025-06-28 PROCEDURE — 0QS706Z REPOSITION LEFT UPPER FEMUR WITH INTRAMEDULLARY INTERNAL FIXATION DEVICE, OPEN APPROACH: ICD-10-PCS | Performed by: ORTHOPAEDIC SURGERY

## 2025-06-28 PROCEDURE — 85027 COMPLETE CBC AUTOMATED: CPT | Performed by: INTERNAL MEDICINE

## 2025-06-28 PROCEDURE — 7100000002 HC RECOVERY ROOM TIME - EACH INCREMENTAL 1 MINUTE: Performed by: ORTHOPAEDIC SURGERY

## 2025-06-28 PROCEDURE — 3700000001 HC GENERAL ANESTHESIA TIME - INITIAL BASE CHARGE: Performed by: ORTHOPAEDIC SURGERY

## 2025-06-28 PROCEDURE — 2500000004 HC RX 250 GENERAL PHARMACY W/ HCPCS (ALT 636 FOR OP/ED): Mod: JZ | Performed by: NURSE PRACTITIONER

## 2025-06-28 DEVICE — IMPLANTABLE DEVICE: Type: IMPLANTABLE DEVICE | Site: HIP | Status: NON-FUNCTIONAL

## 2025-06-28 DEVICE — IMPLANTABLE DEVICE: Type: IMPLANTABLE DEVICE | Site: HIP | Status: FUNCTIONAL

## 2025-06-28 DEVICE — SCREW, LOCKING, 5 X 35MM: Type: IMPLANTABLE DEVICE | Site: HIP | Status: FUNCTIONAL

## 2025-06-28 RX ORDER — ROCURONIUM BROMIDE 10 MG/ML
INJECTION, SOLUTION INTRAVENOUS AS NEEDED
Status: DISCONTINUED | OUTPATIENT
Start: 2025-06-28 | End: 2025-06-28

## 2025-06-28 RX ORDER — IPRATROPIUM BROMIDE AND ALBUTEROL SULFATE 2.5; .5 MG/3ML; MG/3ML
3 SOLUTION RESPIRATORY (INHALATION) EVERY 2 HOUR PRN
Status: ACTIVE | OUTPATIENT
Start: 2025-06-28

## 2025-06-28 RX ORDER — NALOXONE HYDROCHLORIDE 0.4 MG/ML
0.4 INJECTION, SOLUTION INTRAMUSCULAR; INTRAVENOUS; SUBCUTANEOUS AS NEEDED
Status: ACTIVE | OUTPATIENT
Start: 2025-06-28

## 2025-06-28 RX ORDER — ACETAMINOPHEN 325 MG/1
650 TABLET ORAL EVERY 4 HOURS PRN
Status: DISCONTINUED | OUTPATIENT
Start: 2025-06-28 | End: 2025-06-28 | Stop reason: HOSPADM

## 2025-06-28 RX ORDER — HYDROMORPHONE HYDROCHLORIDE 1 MG/ML
1 INJECTION, SOLUTION INTRAMUSCULAR; INTRAVENOUS; SUBCUTANEOUS ONCE
Status: COMPLETED | OUTPATIENT
Start: 2025-06-28 | End: 2025-06-28

## 2025-06-28 RX ORDER — PHENYLEPHRINE HCL IN 0.9% NACL 0.4MG/10ML
SYRINGE (ML) INTRAVENOUS AS NEEDED
Status: DISCONTINUED | OUTPATIENT
Start: 2025-06-28 | End: 2025-06-28

## 2025-06-28 RX ORDER — DEXMEDETOMIDINE IN 0.9 % NACL 20 MCG/5ML
SYRINGE (ML) INTRAVENOUS AS NEEDED
Status: DISCONTINUED | OUTPATIENT
Start: 2025-06-28 | End: 2025-06-28

## 2025-06-28 RX ORDER — HYDRALAZINE HYDROCHLORIDE 20 MG/ML
5 INJECTION INTRAMUSCULAR; INTRAVENOUS EVERY 30 MIN PRN
Status: DISCONTINUED | OUTPATIENT
Start: 2025-06-28 | End: 2025-06-28 | Stop reason: HOSPADM

## 2025-06-28 RX ORDER — BUPIVACAINE HCL/EPINEPHRINE 0.25-.0005
VIAL (ML) INJECTION AS NEEDED
Status: DISCONTINUED | OUTPATIENT
Start: 2025-06-28 | End: 2025-06-28 | Stop reason: HOSPADM

## 2025-06-28 RX ORDER — CEFAZOLIN SODIUM 2 G/50ML
2 SOLUTION INTRAVENOUS EVERY 8 HOURS
Status: COMPLETED | OUTPATIENT
Start: 2025-06-28 | End: 2025-06-29

## 2025-06-28 RX ORDER — OXYCODONE HYDROCHLORIDE 5 MG/1
5 TABLET ORAL EVERY 4 HOURS PRN
Status: DISCONTINUED | OUTPATIENT
Start: 2025-06-28 | End: 2025-06-28 | Stop reason: HOSPADM

## 2025-06-28 RX ORDER — HYDROMORPHONE HYDROCHLORIDE 1 MG/ML
1 INJECTION, SOLUTION INTRAMUSCULAR; INTRAVENOUS; SUBCUTANEOUS
Status: DISCONTINUED | OUTPATIENT
Start: 2025-06-28 | End: 2025-06-29

## 2025-06-28 RX ORDER — LABETALOL HYDROCHLORIDE 5 MG/ML
5 INJECTION, SOLUTION INTRAVENOUS ONCE AS NEEDED
Status: DISCONTINUED | OUTPATIENT
Start: 2025-06-28 | End: 2025-06-28 | Stop reason: HOSPADM

## 2025-06-28 RX ORDER — ONDANSETRON HYDROCHLORIDE 2 MG/ML
4 INJECTION, SOLUTION INTRAVENOUS ONCE AS NEEDED
Status: DISCONTINUED | OUTPATIENT
Start: 2025-06-28 | End: 2025-06-28 | Stop reason: HOSPADM

## 2025-06-28 RX ORDER — FENTANYL CITRATE 50 UG/ML
INJECTION, SOLUTION INTRAMUSCULAR; INTRAVENOUS AS NEEDED
Status: DISCONTINUED | OUTPATIENT
Start: 2025-06-28 | End: 2025-06-28

## 2025-06-28 RX ORDER — CEFAZOLIN 1 G/1
INJECTION, POWDER, FOR SOLUTION INTRAVENOUS AS NEEDED
Status: DISCONTINUED | OUTPATIENT
Start: 2025-06-28 | End: 2025-06-28

## 2025-06-28 RX ORDER — SUCCINYLCHOLINE CHLORIDE 20 MG/ML
INJECTION INTRAMUSCULAR; INTRAVENOUS AS NEEDED
Status: DISCONTINUED | OUTPATIENT
Start: 2025-06-28 | End: 2025-06-28

## 2025-06-28 RX ORDER — LIDOCAINE HYDROCHLORIDE 40 MG/ML
SOLUTION TOPICAL AS NEEDED
Status: DISCONTINUED | OUTPATIENT
Start: 2025-06-28 | End: 2025-06-28

## 2025-06-28 RX ORDER — OXYCODONE HYDROCHLORIDE 5 MG/1
10 TABLET ORAL EVERY 4 HOURS PRN
Status: DISCONTINUED | OUTPATIENT
Start: 2025-06-28 | End: 2025-06-28 | Stop reason: HOSPADM

## 2025-06-28 RX ORDER — SODIUM CHLORIDE, SODIUM LACTATE, POTASSIUM CHLORIDE, CALCIUM CHLORIDE 600; 310; 30; 20 MG/100ML; MG/100ML; MG/100ML; MG/100ML
50 INJECTION, SOLUTION INTRAVENOUS CONTINUOUS
Status: DISCONTINUED | OUTPATIENT
Start: 2025-06-28 | End: 2025-06-29

## 2025-06-28 RX ORDER — LIDOCAINE HYDROCHLORIDE 20 MG/ML
INJECTION, SOLUTION INFILTRATION; PERINEURAL AS NEEDED
Status: DISCONTINUED | OUTPATIENT
Start: 2025-06-28 | End: 2025-06-28

## 2025-06-28 RX ORDER — PROPOFOL 10 MG/ML
INJECTION, EMULSION INTRAVENOUS AS NEEDED
Status: DISCONTINUED | OUTPATIENT
Start: 2025-06-28 | End: 2025-06-28

## 2025-06-28 RX ORDER — ACETAMINOPHEN 325 MG/1
650 TABLET ORAL EVERY 6 HOURS SCHEDULED
Status: DISPENSED | OUTPATIENT
Start: 2025-06-29

## 2025-06-28 RX ORDER — ALBUTEROL SULFATE 0.83 MG/ML
2.5 SOLUTION RESPIRATORY (INHALATION) ONCE AS NEEDED
Status: DISCONTINUED | OUTPATIENT
Start: 2025-06-28 | End: 2025-06-28 | Stop reason: HOSPADM

## 2025-06-28 RX ADMIN — Medication 160 MCG: at 11:27

## 2025-06-28 RX ADMIN — CEFAZOLIN SODIUM 2 G: 2 SOLUTION INTRAVENOUS at 22:39

## 2025-06-28 RX ADMIN — HYDROMORPHONE HYDROCHLORIDE 0.5 MG: 1 INJECTION, SOLUTION INTRAMUSCULAR; INTRAVENOUS; SUBCUTANEOUS at 22:31

## 2025-06-28 RX ADMIN — CEFAZOLIN 2 G: 330 INJECTION, POWDER, FOR SOLUTION INTRAMUSCULAR; INTRAVENOUS at 11:30

## 2025-06-28 RX ADMIN — ONDANSETRON 4 MG: 2 INJECTION INTRAMUSCULAR; INTRAVENOUS at 13:00

## 2025-06-28 RX ADMIN — PROPOFOL 100 MG: 10 INJECTION, EMULSION INTRAVENOUS at 11:18

## 2025-06-28 RX ADMIN — ALUMINUM HYDROXIDE, MAGNESIUM HYDROXIDE, AND DIMETHICONE 10 ML: 200; 20; 200 SUSPENSION ORAL at 21:06

## 2025-06-28 RX ADMIN — FLUVOXAMINE MALEATE 200 MG: 50 TABLET ORAL at 20:58

## 2025-06-28 RX ADMIN — Medication 120 MCG: at 11:18

## 2025-06-28 RX ADMIN — DOCUSATE SODIUM 100 MG: 100 CAPSULE, LIQUID FILLED ORAL at 20:58

## 2025-06-28 RX ADMIN — ONDANSETRON 4 MG: 2 INJECTION INTRAMUSCULAR; INTRAVENOUS at 18:52

## 2025-06-28 RX ADMIN — Medication 160 MCG: at 11:33

## 2025-06-28 RX ADMIN — FLUVOXAMINE MALEATE 200 MG: 50 TABLET ORAL at 00:43

## 2025-06-28 RX ADMIN — PROPOFOL 30 MG: 10 INJECTION, EMULSION INTRAVENOUS at 12:12

## 2025-06-28 RX ADMIN — HYDROMORPHONE HYDROCHLORIDE 1 MG: 1 INJECTION, SOLUTION INTRAMUSCULAR; INTRAVENOUS; SUBCUTANEOUS at 03:17

## 2025-06-28 RX ADMIN — HYDROMORPHONE HYDROCHLORIDE 0.2 MG: 2 INJECTION, SOLUTION INTRAMUSCULAR; INTRAVENOUS; SUBCUTANEOUS at 12:29

## 2025-06-28 RX ADMIN — SUGAMMADEX 200 MG: 100 INJECTION, SOLUTION INTRAVENOUS at 12:59

## 2025-06-28 RX ADMIN — Medication 3 L/MIN: at 18:48

## 2025-06-28 RX ADMIN — ROCURONIUM BROMIDE 10 MG: 10 INJECTION, SOLUTION INTRAVENOUS at 11:59

## 2025-06-28 RX ADMIN — Medication 120 MCG: at 12:29

## 2025-06-28 RX ADMIN — ALUMINUM HYDROXIDE, MAGNESIUM HYDROXIDE, AND DIMETHICONE 10 ML: 200; 20; 200 SUSPENSION ORAL at 00:43

## 2025-06-28 RX ADMIN — CARBIDOPA AND LEVODOPA 2 TABLET: 25; 100 TABLET, EXTENDED RELEASE ORAL at 00:43

## 2025-06-28 RX ADMIN — BUSPIRONE HYDROCHLORIDE 5 MG: 5 TABLET ORAL at 00:43

## 2025-06-28 RX ADMIN — FENTANYL CITRATE 50 MCG: 50 INJECTION, SOLUTION INTRAMUSCULAR; INTRAVENOUS at 11:58

## 2025-06-28 RX ADMIN — LIDOCAINE HYDROCHLORIDE 4 ML: 40 SOLUTION TOPICAL at 11:21

## 2025-06-28 RX ADMIN — LIDOCAINE HYDROCHLORIDE 30 MG: 20 INJECTION, SOLUTION INFILTRATION; PERINEURAL at 11:18

## 2025-06-28 RX ADMIN — Medication 160 MCG: at 11:40

## 2025-06-28 RX ADMIN — ROCURONIUM BROMIDE 30 MG: 10 INJECTION, SOLUTION INTRAVENOUS at 11:34

## 2025-06-28 RX ADMIN — Medication 160 MCG: at 12:21

## 2025-06-28 RX ADMIN — CARBOXYMETHYLCELLULOSE SODIUM 1 DROP: 5 SOLUTION/ DROPS OPHTHALMIC at 20:57

## 2025-06-28 RX ADMIN — FENTANYL CITRATE 50 MCG: 50 INJECTION, SOLUTION INTRAMUSCULAR; INTRAVENOUS at 11:18

## 2025-06-28 RX ADMIN — SODIUM CHLORIDE, POTASSIUM CHLORIDE, SODIUM LACTATE AND CALCIUM CHLORIDE: 600; 310; 30; 20 INJECTION, SOLUTION INTRAVENOUS at 11:13

## 2025-06-28 RX ADMIN — MORPHINE SULFATE 2 MG: 2 INJECTION, SOLUTION INTRAMUSCULAR; INTRAVENOUS at 01:39

## 2025-06-28 RX ADMIN — DOCUSATE SODIUM 100 MG: 100 CAPSULE, LIQUID FILLED ORAL at 00:43

## 2025-06-28 RX ADMIN — CARBIDOPA AND LEVODOPA 2 TABLET: 25; 100 TABLET, EXTENDED RELEASE ORAL at 20:58

## 2025-06-28 RX ADMIN — HYDROMORPHONE HYDROCHLORIDE 0.2 MG: 2 INJECTION, SOLUTION INTRAMUSCULAR; INTRAVENOUS; SUBCUTANEOUS at 12:11

## 2025-06-28 RX ADMIN — BUSPIRONE HYDROCHLORIDE 5 MG: 5 TABLET ORAL at 20:58

## 2025-06-28 RX ADMIN — Medication 8 MCG: at 12:05

## 2025-06-28 RX ADMIN — SUCCINYLCHOLINE CHLORIDE 120 MG: 20 INJECTION, SOLUTION INTRAMUSCULAR; INTRAVENOUS at 11:18

## 2025-06-28 SDOH — HEALTH STABILITY: MENTAL HEALTH: HOW OFTEN DO YOU HAVE A DRINK CONTAINING ALCOHOL?: NEVER

## 2025-06-28 SDOH — SOCIAL STABILITY: SOCIAL INSECURITY: ARE THERE ANY APPARENT SIGNS OF INJURIES/BEHAVIORS THAT COULD BE RELATED TO ABUSE/NEGLECT?: UNABLE TO ASSESS

## 2025-06-28 SDOH — SOCIAL STABILITY: SOCIAL INSECURITY: HAS ANYONE EVER THREATENED TO HURT YOUR FAMILY OR YOUR PETS?: NO

## 2025-06-28 SDOH — SOCIAL STABILITY: SOCIAL INSECURITY: HAVE YOU HAD ANY THOUGHTS OF HARMING ANYONE ELSE?: NO

## 2025-06-28 SDOH — SOCIAL STABILITY: SOCIAL INSECURITY
WITHIN THE LAST YEAR, HAVE YOU BEEN RAPED OR FORCED TO HAVE ANY KIND OF SEXUAL ACTIVITY BY YOUR PARTNER OR EX-PARTNER?: PATIENT DECLINED

## 2025-06-28 SDOH — ECONOMIC STABILITY: FOOD INSECURITY
WITHIN THE PAST 12 MONTHS, YOU WORRIED THAT YOUR FOOD WOULD RUN OUT BEFORE YOU GOT THE MONEY TO BUY MORE.: PATIENT DECLINED

## 2025-06-28 SDOH — SOCIAL STABILITY: SOCIAL INSECURITY: ARE YOU OR HAVE YOU BEEN THREATENED OR ABUSED PHYSICALLY, EMOTIONALLY, OR SEXUALLY BY ANYONE?: NO

## 2025-06-28 SDOH — ECONOMIC STABILITY: FOOD INSECURITY: WITHIN THE PAST 12 MONTHS, THE FOOD YOU BOUGHT JUST DIDN'T LAST AND YOU DIDN'T HAVE MONEY TO GET MORE.: PATIENT DECLINED

## 2025-06-28 SDOH — HEALTH STABILITY: MENTAL HEALTH: HOW OFTEN DO YOU HAVE SIX OR MORE DRINKS ON ONE OCCASION?: NEVER

## 2025-06-28 SDOH — SOCIAL STABILITY: SOCIAL INSECURITY: HAS ANYONE EVER THREATENED TO HURT YOUR FAMILY OR YOUR PETS?: UNABLE TO ASSESS

## 2025-06-28 SDOH — SOCIAL STABILITY: SOCIAL INSECURITY
WITHIN THE LAST YEAR, HAVE YOU BEEN KICKED, HIT, SLAPPED, OR OTHERWISE PHYSICALLY HURT BY YOUR PARTNER OR EX-PARTNER?: PATIENT DECLINED

## 2025-06-28 SDOH — SOCIAL STABILITY: SOCIAL INSECURITY: DO YOU FEEL UNSAFE GOING BACK TO THE PLACE WHERE YOU ARE LIVING?: UNABLE TO ASSESS

## 2025-06-28 SDOH — SOCIAL STABILITY: SOCIAL INSECURITY: ABUSE: ADULT

## 2025-06-28 SDOH — ECONOMIC STABILITY: INCOME INSECURITY: IN THE PAST 12 MONTHS HAS THE ELECTRIC, GAS, OIL, OR WATER COMPANY THREATENED TO SHUT OFF SERVICES IN YOUR HOME?: NO

## 2025-06-28 SDOH — SOCIAL STABILITY: SOCIAL INSECURITY: WERE YOU ABLE TO COMPLETE ALL THE BEHAVIORAL HEALTH SCREENINGS?: YES

## 2025-06-28 SDOH — SOCIAL STABILITY: SOCIAL INSECURITY
WITHIN THE LAST YEAR, HAVE YOU BEEN HUMILIATED OR EMOTIONALLY ABUSED IN OTHER WAYS BY YOUR PARTNER OR EX-PARTNER?: PATIENT DECLINED

## 2025-06-28 SDOH — SOCIAL STABILITY: SOCIAL INSECURITY: HAVE YOU HAD THOUGHTS OF HARMING ANYONE ELSE?: NO

## 2025-06-28 SDOH — SOCIAL STABILITY: SOCIAL INSECURITY: DO YOU FEEL ANYONE HAS EXPLOITED OR TAKEN ADVANTAGE OF YOU FINANCIALLY OR OF YOUR PERSONAL PROPERTY?: UNABLE TO ASSESS

## 2025-06-28 SDOH — SOCIAL STABILITY: SOCIAL INSECURITY: DOES ANYONE TRY TO KEEP YOU FROM HAVING/CONTACTING OTHER FRIENDS OR DOING THINGS OUTSIDE YOUR HOME?: NO

## 2025-06-28 SDOH — HEALTH STABILITY: MENTAL HEALTH: HOW MANY DRINKS CONTAINING ALCOHOL DO YOU HAVE ON A TYPICAL DAY WHEN YOU ARE DRINKING?: PATIENT DOES NOT DRINK

## 2025-06-28 SDOH — SOCIAL STABILITY: SOCIAL INSECURITY: WITHIN THE LAST YEAR, HAVE YOU BEEN AFRAID OF YOUR PARTNER OR EX-PARTNER?: PATIENT DECLINED

## 2025-06-28 SDOH — SOCIAL STABILITY: SOCIAL INSECURITY: DOES ANYONE TRY TO KEEP YOU FROM HAVING/CONTACTING OTHER FRIENDS OR DOING THINGS OUTSIDE YOUR HOME?: UNABLE TO ASSESS

## 2025-06-28 SDOH — SOCIAL STABILITY: SOCIAL INSECURITY: DO YOU FEEL ANYONE HAS EXPLOITED OR TAKEN ADVANTAGE OF YOU FINANCIALLY OR OF YOUR PERSONAL PROPERTY?: NO

## 2025-06-28 SDOH — SOCIAL STABILITY: SOCIAL INSECURITY: ARE THERE ANY APPARENT SIGNS OF INJURIES/BEHAVIORS THAT COULD BE RELATED TO ABUSE/NEGLECT?: NO

## 2025-06-28 SDOH — SOCIAL STABILITY: SOCIAL INSECURITY: DO YOU FEEL UNSAFE GOING BACK TO THE PLACE WHERE YOU ARE LIVING?: NO

## 2025-06-28 ASSESSMENT — PAIN SCALES - PAIN ASSESSMENT IN ADVANCED DEMENTIA (PAINAD)
NEGVOCALIZATION: OCCASIONAL MOAN/GROAN, LOW SPEECH, NEGATIVE/DISAPPROVING QUALITY
TOTALSCORE: MEDICATION (SEE MAR);REPOSITIONED;RELAXATION TECHNIQUE;REST
TOTALSCORE: MEDICATION (SEE MAR);REPOSITIONED;RELAXATION TECHNIQUE;REST
TOTALSCORE: 4
CONSOLABILITY: UNABLE TO CONSOLE, DISTRACT OR REASSURE
NEGVOCALIZATION: OCCASIONAL MOAN/GROAN, LOW SPEECH, NEGATIVE/DISAPPROVING QUALITY
FACIALEXPRESSION: SMILING OR INEXPRESSIVE
FACIALEXPRESSION: SAD, FRIGHTENED, FROWN
BREATHING: NORMAL
TOTALSCORE: 6
BODYLANGUAGE: TENSE, DISTRESSED PACING, FIDGETING
CONSOLABILITY: DISTRACTED OR REASSURED BY VOICE/TOUCH
TOTALSCORE: 4
FACIALEXPRESSION: SAD, FRIGHTENED, FROWN
CONSOLABILITY: NO NEED TO CONSOLE
TOTALSCORE: 6
CONSOLABILITY: UNABLE TO CONSOLE, DISTRACT OR REASSURE
FACIALEXPRESSION: SAD, FRIGHTENED, FROWN
NEGVOCALIZATION: REPEATED TROUBLED CALLING OUT, LOUD MOANING/GROANING, CRYING
FACIALEXPRESSION: SAD, FRIGHTENED, FROWN
NEGVOCALIZATION: REPEATED TROUBLED CALLING OUT, LOUD MOANING/GROANING, CRYING
BREATHING: NORMAL
BODYLANGUAGE: RELAXED
CONSOLABILITY: DISTRACTED OR REASSURED BY VOICE/TOUCH
TOTALSCORE: 0
BREATHING: NORMAL
TOTALSCORE: MEDICATION (SEE MAR)
BODYLANGUAGE: TENSE, DISTRESSED PACING, FIDGETING

## 2025-06-28 ASSESSMENT — LIFESTYLE VARIABLES
AUDIT-C TOTAL SCORE: 0
HOW MANY STANDARD DRINKS CONTAINING ALCOHOL DO YOU HAVE ON A TYPICAL DAY: PATIENT DOES NOT DRINK
HOW OFTEN DO YOU HAVE 6 OR MORE DRINKS ON ONE OCCASION: NEVER
AUDIT-C TOTAL SCORE: 0
SKIP TO QUESTIONS 9-10: 1
AUDIT-C TOTAL SCORE: 0
HOW OFTEN DO YOU HAVE A DRINK CONTAINING ALCOHOL: NEVER
SKIP TO QUESTIONS 9-10: 1
SKIP TO QUESTIONS 9-10: 1
HOW OFTEN DO YOU HAVE A DRINK CONTAINING ALCOHOL: NEVER
HOW OFTEN DO YOU HAVE 6 OR MORE DRINKS ON ONE OCCASION: NEVER
HOW MANY STANDARD DRINKS CONTAINING ALCOHOL DO YOU HAVE ON A TYPICAL DAY: PATIENT DOES NOT DRINK
AUDIT-C TOTAL SCORE: 0
AUDIT-C TOTAL SCORE: 0

## 2025-06-28 ASSESSMENT — COGNITIVE AND FUNCTIONAL STATUS - GENERAL
DRESSING REGULAR UPPER BODY CLOTHING: A LOT
CLIMB 3 TO 5 STEPS WITH RAILING: TOTAL
DRESSING REGULAR LOWER BODY CLOTHING: A LOT
HELP NEEDED FOR BATHING: TOTAL
MOBILITY SCORE: 10
EATING MEALS: A LITTLE
DRESSING REGULAR LOWER BODY CLOTHING: A LOT
PERSONAL GROOMING: A LITTLE
MOVING FROM LYING ON BACK TO SITTING ON SIDE OF FLAT BED WITH BEDRAILS: A LOT
MOVING FROM LYING ON BACK TO SITTING ON SIDE OF FLAT BED WITH BEDRAILS: A LOT
EATING MEALS: A LOT
TOILETING: A LOT
STANDING UP FROM CHAIR USING ARMS: A LOT
TURNING FROM BACK TO SIDE WHILE IN FLAT BAD: A LOT
HELP NEEDED FOR BATHING: A LOT
CLIMB 3 TO 5 STEPS WITH RAILING: TOTAL
MOVING TO AND FROM BED TO CHAIR: A LOT
TURNING FROM BACK TO SIDE WHILE IN FLAT BAD: A LOT
PERSONAL GROOMING: A LITTLE
DAILY ACTIVITIY SCORE: 14
MOVING FROM LYING ON BACK TO SITTING ON SIDE OF FLAT BED WITH BEDRAILS: A LOT
STANDING UP FROM CHAIR USING ARMS: A LOT
TURNING FROM BACK TO SIDE WHILE IN FLAT BAD: A LOT
STANDING UP FROM CHAIR USING ARMS: A LOT
MOBILITY SCORE: 10
DRESSING REGULAR UPPER BODY CLOTHING: A LOT
WALKING IN HOSPITAL ROOM: TOTAL
DRESSING REGULAR UPPER BODY CLOTHING: A LOT
MOVING TO AND FROM BED TO CHAIR: A LOT
PERSONAL GROOMING: A LOT
WALKING IN HOSPITAL ROOM: TOTAL
DRESSING REGULAR LOWER BODY CLOTHING: TOTAL
TOILETING: A LOT
DAILY ACTIVITIY SCORE: 9
MOVING TO AND FROM BED TO CHAIR: A LOT
TOILETING: TOTAL
EATING MEALS: A LITTLE
DAILY ACTIVITIY SCORE: 14
WALKING IN HOSPITAL ROOM: TOTAL
HELP NEEDED FOR BATHING: A LOT
MOBILITY SCORE: 10
CLIMB 3 TO 5 STEPS WITH RAILING: TOTAL
PATIENT BASELINE BEDBOUND: NO

## 2025-06-28 ASSESSMENT — ACTIVITIES OF DAILY LIVING (ADL)
GROOMING: NEEDS ASSISTANCE
DRESSING YOURSELF: NEEDS ASSISTANCE
LACK_OF_TRANSPORTATION: PATIENT DECLINED
BATHING: NEEDS ASSISTANCE
JUDGMENT_ADEQUATE_SAFELY_COMPLETE_DAILY_ACTIVITIES: NO
LACK_OF_TRANSPORTATION: NO
TOILETING: NEEDS ASSISTANCE
ADEQUATE_TO_COMPLETE_ADL: YES
FEEDING YOURSELF: UNABLE TO ASSESS
HEARING - LEFT EAR: DIFFICULTY WITH NOISE
HEARING - RIGHT EAR: DIFFICULTY WITH NOISE
WALKS IN HOME: DEPENDENT
PATIENT'S MEMORY ADEQUATE TO SAFELY COMPLETE DAILY ACTIVITIES?: NO
LACK_OF_TRANSPORTATION: PATIENT DECLINED

## 2025-06-28 ASSESSMENT — PATIENT HEALTH QUESTIONNAIRE - PHQ9
1. LITTLE INTEREST OR PLEASURE IN DOING THINGS: NOT AT ALL
2. FEELING DOWN, DEPRESSED OR HOPELESS: NOT AT ALL
1. LITTLE INTEREST OR PLEASURE IN DOING THINGS: NOT AT ALL
SUM OF ALL RESPONSES TO PHQ9 QUESTIONS 1 & 2: 0

## 2025-06-28 ASSESSMENT — ENCOUNTER SYMPTOMS
WHEEZING: 0
ENDOCRINE NEGATIVE: 1
SHORTNESS OF BREATH: 0
EYES NEGATIVE: 1
CONFUSION: 1
SPEECH DIFFICULTY: 1
COUGH: 0
ACTIVITY CHANGE: 1
WOUND: 1
ALLERGIC/IMMUNOLOGIC NEGATIVE: 1
HEMATOLOGIC/LYMPHATIC NEGATIVE: 1

## 2025-06-28 ASSESSMENT — PAIN SCALES - GENERAL
PAINLEVEL_OUTOF10: 0 - NO PAIN

## 2025-06-28 ASSESSMENT — PAIN - FUNCTIONAL ASSESSMENT
PAIN_FUNCTIONAL_ASSESSMENT: 0-10
PAIN_FUNCTIONAL_ASSESSMENT: PAINAD (PAIN ASSESSMENT IN ADVANCED DEMENTIA SCALE)

## 2025-06-28 NOTE — ED PROVIDER NOTES
HPI   Chief Complaint   Patient presents with    Fall     Per NH staff, pt fell out of the WC and landed on the floor. Staff states no LOC and did not hit head. Pt is on eliquis. Pt is a DNR-CC. EMS states that there was positive shortening and rotations.       HPI  Shanon Smith is a 89 y/o F with pmhx dementia (normally AA&Ox2), afib on eliquis, presenting from nursing home via EMS for fall out of wheelchair. Nursing report stated it was an unwitnessed fall and she slid out of wheelchair and was found sitting on the ground crying for help. Did not hit head. Found to have shortened and externally rotated left hip. EMS splinted with air splint and treated pain with fentanyl prior to arrival. Daughter HC-POA and son in law bedside.     Denies fevers, chills, neck pain, back pain, inability to ambulate, CP, cough, SOB, vision changes, nausea, vomiting, numbness and tingling, diarrhea or abdominal pain. Denies any alcohol, tobacco, smoking, and illicit drugs.  Denies any new medications or dose adjustments.  Denies any additional trauma, injury or anticoagulation use.  Denies any additional symptoms or complaints at this time.       Patient History   Medical History[1]  Surgical History[2]  Family History[3]  Social History[4]    Physical Exam   ED Triage Vitals [06/27/25 1956]   Temperature Heart Rate Respirations BP   36 °C (96.8 °F) 83 18 (!) 194/66      Pulse Ox Temp Source Heart Rate Source Patient Position   (!) 93 % Temporal -- Lying      BP Location FiO2 (%)     Right arm --       Physical Exam  Constitutional:       General: She is not in acute distress.  HENT:      Head: Normocephalic and atraumatic.      Comments: Absent dentition     Right Ear: External ear normal.      Left Ear: External ear normal.      Nose: Nose normal.      Mouth/Throat:      Mouth: Mucous membranes are moist.      Pharynx: Oropharynx is clear.   Eyes:      Extraocular Movements: Extraocular movements intact.       Conjunctiva/sclera: Conjunctivae normal.      Pupils: Pupils are equal, round, and reactive to light.   Cardiovascular:      Rate and Rhythm: Normal rate. Rhythm irregular.      Pulses: Normal pulses.      Heart sounds: Normal heart sounds.   Pulmonary:      Effort: Pulmonary effort is normal.      Breath sounds: Normal breath sounds.   Abdominal:      Palpations: Abdomen is soft.      Tenderness: There is no abdominal tenderness.   Musculoskeletal:      Cervical back: Normal range of motion and neck supple.      Comments: Left lower extremity shortened and rotated in. Pulses and sensation intact, extremely painful on palpation    Skin:     General: Skin is warm and dry.      Capillary Refill: Capillary refill takes less than 2 seconds.   Neurological:      General: No focal deficit present.      Mental Status: She is alert. Mental status is at baseline.      Comments: AA&O x2 at baseline       ED Course & MDM   ED Course as of 06/27/25 2346 Fri Jun 27, 2025 2145 Contacted ortho on call for reccs fracture management  [EF]   2145 IMPRESSION:  1. Comminuted, angulated left intertrochanteric femoral fracture.   [EF]      ED Course User Index  [EF] Brandy Abbott, DO         Diagnoses as of 06/27/25 2346   Closed intertrochanteric fracture, left, initial encounter                 No data recorded     Jose L Coma Scale Score: 14 (06/27/25 2003 : Cristal Love RN)                     Medical Decision Making  Patient found to have a Comminuted, angulated left intertrochanteric femoral fracture. Dr. Sidhu from ortho contacted, pre op labs drawn. Will admit to floor under inpatient status.  Continue NPO status and hold eliquis for repair tomorrow AM.       I have personally reviewed all available pertinent labs, imaging, and consult notes with the patient.   All questions and concerns were addressed. Patient verbalizes understanding instructions and agrees with established plan of care.   Patient seen and  discussed with Dr. Deb Abbott DO, MA   PGY-1 [facility]       Brandy Abbott DO  Resident  06/27/25 2214         Brandy Abbott DO  Resident  06/27/25 2216       [1]   Past Medical History:  Diagnosis Date    Alzheimer's disease with late onset (CODE) 05/05/2021    Late onset Alzheimer's dementia without behavioral disturbance    Arthritis of knee, left 03/11/2024    Asymmetric SNHL (sensorineural hearing loss) 03/11/2024    Clouded consciousness 03/11/2024    Diverticulosis 03/11/2024    Encounter for palliative care 04/26/2021    Hospice care patient    Encounter for palliative care 05/05/2021    Hospice care patient    History of colonic polyps 03/11/2024    Hyperlipidemia 03/11/2024    Inflammatory bowel disease 03/11/2024    Pancytopenia 03/11/2024    Parkinson's disease 12/14/2015    Parkinsonism    Personal history of other diseases of the circulatory system 04/26/2021    History of atrial fibrillation    Personal history of other diseases of the circulatory system 05/05/2021    History of atrial fibrillation    Personal history of other diseases of the nervous system and sense organs 04/26/2021    History of Parkinson's disease    Personal history of other diseases of the nervous system and sense organs 05/05/2021    History of restless legs syndrome    Personal history of other mental and behavioral disorders 07/16/2018    History of dementia    Personal history of other specified conditions 04/26/2021    History of weakness    Primary osteoarthritis involving multiple joints 03/11/2024    Rectal hemorrhage 03/11/2024    Rectal prolapse 03/11/2024    Recurrent falls 11/14/2020    Formatting of this note might be different from the original. --fall precautions --PT/OT eval    Thrombocytopenia 03/11/2024    Unspecified abnormalities of gait and mobility 05/06/2015    Gait disturbance   [2]   Past Surgical History:  Procedure Laterality Date    MR HEAD ANGIO  WO IV CONTRAST  9/14/2014    MR HEAD ANGIO WO IV CONTRAST [facility]    OTHER SURGICAL HISTORY  10/07/2022    Cholecystectomy   [3]   Family History  Family history unknown: Yes   [4]   Social History  Tobacco Use    Smoking status: Never    Smokeless tobacco: Never   Substance Use Topics    Alcohol use: Not Currently    Drug use: Never        Brandy Abbott DO  Resident  06/27/25 8150

## 2025-06-28 NOTE — PROGRESS NOTES
06/28/25 0854   Discharge Planning   Living Arrangements Other (Comment)  (Pt from [facility] > 180 days)   Support Systems Children   Assistance Needed A&OX2-3 per family; dependent on assist for ADLs with walker and assist to wheelchair;room air baseline-currently 2L NC-denies CPAP; doesn't drive; PCP Dr Corrales; on Eliquis prior to hospitalization   Type of Residence Skilled nursing facility   Do you have animals or pets at home? No   Who is requesting discharge planning? Provider   Home or Post Acute Services Post acute facilities (Rehab/SNF/etc)   Type of Post Acute Facility Services Skilled nursing   Expected Discharge Disposition SNF  (Daughter requesting new SNF (has list) for skilled therapy and then patient will return to [facility] long term from SNF. No precert needed for new SNF but patient requires 3 Medicare midnights to transition to snf. This TCC asked [facility] for PASRR/7000)   Does the patient need discharge transport arranged? Yes   RoundTrip coordination needed? Yes   Has discharge transport been arranged? No   Financial Resource Strain   How hard is it for you to pay for the very basics like food, housing, medical care, and heating? Not hard   Housing Stability   In the last 12 months, was there a time when you were not able to pay the mortgage or rent on time? N   In the past 12 months, how many times have you moved where you were living? 1   At any time in the past 12 months, were you homeless or living in a shelter (including now)? N   Transportation Needs   In the past 12 months, has lack of transportation kept you from medical appointments or from getting medications? no   In the past 12 months, has lack of transportation kept you from meetings, work, or from getting things needed for daily living? No

## 2025-06-28 NOTE — ANESTHESIA PROCEDURE NOTES
Airway  Date/Time: 6/28/2025 11:21 AM  Reason: elective    Airway not difficult    Staffing  Performed: attending   Authorized by: Aisha Herrera MD    Performed by: Aisha Herrera MD  Patient location during procedure: OR    Patient Condition  Indications for airway management: anesthesia  Patient position: sniffing  MILS not maintained throughout  Planned trial extubation  Sedation level: deep     Final Airway Details   Preoxygenated: yes  Final airway type: endotracheal airway  Successful airway: ETT  Cuffed: yes   Successful intubation technique: video laryngoscopy  Adjuncts used in placement: intubating stylet  Blade: Heath  Blade size: #3  ETT size (mm): 7.0  Cormack-Lehane Classification: grade I - full view of glottis  Placement verified by: capnometry   Measured from: lips  ETT to lips (cm): 20  Number of attempts at approach: 1

## 2025-06-28 NOTE — OP NOTE
Pre-Operative Diagnosis: left hip fracture  Post-Operative Diagnosis: same  Procedure: left hip fracture operative fixation with cephalomedullary device  Surgeon: Nahum  Anesthesia: General  Complications: none  Estimated blood loss: 200 mL  Specimen: none  Findings: See below  Implants:  Implant Name Type Inv. Item Serial No.  Lot No. LRB No. Used Action   SCREW, LOCKING, 5 X 35MM - -[id number] - [id number] Screw SCREW, LOCKING, 5 X 35MM [id number] [employer]un [id number] Left 1 Implanted   Intermediate Gamma 4 nail    Disposition: Good/PACU      Indications: Patient sustained a fall onto left hip and had immediate pain and inability to bear weight. Radiographs demonstrated displaced peritrochanteric femur fracture. Risks and benefits of operative fixation were reviewed with the patient and their family.  After thoroughly reviewing they wish to proceed with surgery, as indicated. Expected operative course and postoperative courses reviewed and all questions addressed.    Operative course: Patient was greeted in the preoperative holding area and the operative site was marked with indelible marker. Patient was brought back to the operating room suite where general anesthetic was induced by the anesthesia team. Patient was placed onto the fracture table with the left leg in gentle traction. Fluoroscopic imaging was then utilized to obtain good reduction in orthogonal views. The lower extremity was prepped and draped in standard sterile fashion and timeout procedure was performed as per standard protocol. A longitudinal incision was made 2 fingerbreadths proximal to the greater trochanter and guidewire was inserted to the level of the greater trochanter. In order to obtain better approach to guidewire insertion the leg was further a deducted.  After doing this fluoroscopic imaging again confirmed appropriate fracture reduction. Fluoroscopic imaging confirmed positioning of the starting point and  guidewire was advanced. Imaging was used to confirm positioning of the guidewire with orthogonal views. Opening reamer was then utilized and following this the short nail was placed. The nail was advanced to appropriate positioning utilizing fluoroscopic imaging. Following this the aiming guide for the lag screw was used and K wire was advanced to appropriate positioning in the femoral head as confirmed on fluoroscopic imaging. Step cut guide drill was then utilized and titanium lag screw was positioned. The set screw was then positioned and attention was taken to the distal locking screw.  The aiming jig was placed and a distal locking screw was placed in a static position using standard technique. Aiming guide was then removed and final fluoroscopic imaging confirmed implant positioning as well as fracture reduction. Wounds were then copiously irrigated and deep fascia was reapproximated with 0 Vicryl in a figure-of-eight fashion followed by 2-0 Vicryl in a buried interrupted fashion and staples on the skin. Dry sterile dressings were applied and patient was moved back to their hospital bed and awoken uneventfully.    Patient will be weightbearing as tolerated on operative leg and will plan on resuming patient's regular anticoagulation.

## 2025-06-28 NOTE — ANESTHESIA POSTPROCEDURE EVALUATION
Patient: Shanon Smith    Procedure Summary       Date: 06/28/25 Room / Location: [facility] OR 01 / [facility]    Anesthesia Start: 1113 Anesthesia Stop: 1315    Procedure: Left hip operative fixation (Left: Hip) Diagnosis:       Closed intertrochanteric fracture, left, initial encounter      (Closed intertrochanteric fracture, left, initial encounter [S72.142A])    Surgeons: Jimbo Sidhu MD Responsible Provider: Aisha Herrera MD    Anesthesia Type: general ASA Status: 3 - Emergent            Anesthesia Type: general    Vitals Value Taken Time   /73 06/28/25 13:37   Temp 36 °C (96.8 °F) 06/28/25 13:07   Pulse 80 06/28/25 13:37   Resp 14 06/28/25 13:37   SpO2 97 % 06/28/25 13:37       Anesthesia Post Evaluation    Patient location during evaluation: PACU  Patient participation: complete - patient participated  Level of consciousness: sleepy but conscious and awake  Pain management: adequate  Airway patency: patent  Cardiovascular status: acceptable  Respiratory status: acceptable  Hydration status: acceptable  Postoperative Nausea and Vomiting: none  Comments: Denies nausea         No notable events documented.

## 2025-06-28 NOTE — ANESTHESIA PREPROCEDURE EVALUATION
Patient: Shanon Smith    Procedure Information       Date: 25    Procedure: Left hip operative fixation (Left)    Location: [facility]    Surgeons: Jimbo Sidhu MD            Relevant Problems   Cardiac   (+) Atrial fibrillation (Multi)   (+) Chronic combined systolic and diastolic congestive heart failure   (+) Secondary hypertension      Neuro   (+) Dementia due to Parkinson's disease without behavioral disturbance (Multi)   (+) Depression with anxiety   (+) Severe dementia with mood disturbance      GI   (+) Gastroesophageal reflux disease      /Renal   (+) Urinary tract infection without hematuria      Endocrine   (+) Hypothyroidism      ID   (+) Urinary tract infection without hematuria       Clinical information reviewed:   Tobacco  Allergies  Meds  Problems  Med Hx  Surg Hx   Fam Hx  Soc   Hx        NPO Detail:  No data recorded     Physical Exam    Airway  Mallampati: IV  TM distance: >3 FB  Neck ROM: full  Mouth openin finger widths     Cardiovascular   Rhythm: irregular  Rate: normal     Dental   Comments: edentulous     Pulmonary - normal exam   Abdominal - normal exam           Anesthesia Plan    History of general anesthesia?: yes  History of complications of general anesthesia?: no    ASA 3 - emergent     general   (Discussed with POA regarding suspension of DNR for duration of case paper form signed and in bedside chart of patient. )  intravenous induction   Postoperative pain plan includes opioids.  Trial extubation is planned.  Anesthetic plan and risks discussed with legal guardian.  Use of blood products discussed with legal guardian who consented to blood products.    Plan discussed with attending.

## 2025-06-28 NOTE — CARE PLAN
"Pt was very confused this shift, yelling out, screaming for help, and telling the nurses that they \"need to send someone older in to see her.\"  Pt continued to attempt to pull out her IV and her purewick, despite educating her and redirecting her. She did not understand what was being told her and she is VERY [place]. Sitter is now at bedside. Pt was telling the sitter multiple times that her leg hurt so nurse gave the pt PRN morphine. After the morphine is when the pt really started yelling. Dr Felipe was made aware that pt was screaming for help so he ordered 1mg IV dilaudid. Pt slept for about 30 minutes and then woke up yelling again. She slept for about that past 2 hours of the shift. She is NPO and is supposed to get surgery around 0930. VSS. Will continue to monitor.  "

## 2025-06-28 NOTE — PROGRESS NOTES
"PROGRESS NOTE    ASSESSMENT AND PLAN:     Fall with intertrochanteric fracture of the left hip  -Patient had a mechanical fall at the skilled nursing facility.    Plan:  -Seen by orthopedic surgery, plan for surgical intervention.  - Hold anticoagulation (apixaban). Last dose was this am  -Continue pain control     Atrial fibrillation  Troponin elevation  -Troponin elevation most likely related to mild renal disease and fall  -Currently no active chest pain, EKG shows no acute findings.  -Continue to monitor     Parkinson's disease  - On carbidopa-levodopa     History of combined systolic and diastolic heart failure  - Clinically compensated  - Continue to monitor     Code status  - Addressed with daughter/POA: Chitra Blancas  - Pt is DNR in the event of a cardiopulmonary arrest        SUBJECTIVE:   Admit Date: 6/27/2025    Interval History: Wakes up, no active complaints.       OBJECTIVE:   Vitals: /52   Pulse 88   Temp 36.4 °C (97.5 °F) (Temporal)   Resp 16   Ht (!) 1.499 m (4' 11\")   Wt 70.5 kg (155 lb 8 oz)   SpO2 97%   BMI 31.41 kg/m²    Wt Readings from Last 3 Encounters:   06/27/25 70.5 kg (155 lb 8 oz)   07/05/24 66.3 kg (146 lb 3.2 oz)   01/13/24 69.4 kg (152 lb 14.4 oz)      24HR INTAKE/OUTPUT:    Intake/Output Summary (Last 24 hours) at 6/28/2025 1259  Last data filed at 6/28/2025 1247  Gross per 24 hour   Intake 960 ml   Output 0 ml   Net 960 ml       PHYSICAL EXAM:   GENERAL: Laying in bed, does not appear to be in any distress.   HEENT: HEAD: Normocephalic atraumatic.  Neck: Supple.  Eyes: Pupils are reactive to direct light.   CVS: S1, S2 heard.  Irregular rhythm  LUNGS: Clear to auscultate bilaterally. No wheezing or rhonchi appreciated.  ABDOMEN: Soft, nontender to palpate. Positive bowel sounds. No guarding or rebound appreciated.  NEUROLOGICAL: No focal neurological deficits appreciated. Cranial nerves are grossly intact.  EXTREMITIES: No edema appreciated.  SKIN:  Grossly intact, warm " "and dry.      LABS/IMAGING AND MEDICATIONS:   Scheduled Meds:Scheduled Medications[1]  PRN Meds:PRN Medications[2]    No lab exists for component: \"CBC\"   No lab exists for component: \"CMP\"   No lab exists for component: \"TROPONIN\"  Results from last 7 days   Lab Units 06/28/25  0555   BNP pg/mL 295*     Results from last 7 days   Lab Units 06/27/25  2157   INR  1.3*     No lab exists for component: \"LIPIDS\"       No lab exists for component: \"URINALYSIS\"          BMP:  Results from last 7 days   Lab Units 06/28/25  0555 06/27/25 2157   SODIUM mmol/L 134* 136   POTASSIUM mmol/L 4.5 3.8   CHLORIDE mmol/L 100 99   CO2 mmol/L 27 29   BUN mg/dL 23 21   CREATININE mg/dL 1.30* 1.39*       CBC:  Results from last 7 days   Lab Units 06/28/25  0555 06/27/25  2157   WBC AUTO x10*3/uL 9.9 11.5*   RBC AUTO x10*6/uL 3.94* 4.09   HEMOGLOBIN g/dL 12.3 12.8   HEMATOCRIT % 34.4* 37.5   MCV fL 87 92   MCH pg 31.2 31.3   MCHC g/dL 35.8 34.1   RDW % 13.3 13.5   PLATELETS AUTO x10*3/uL 158 170       Cardiac Enzymes:   Results from last 7 days   Lab Units 06/28/25  0840 06/28/25  0555   TROPHS ng/L 29* 26*         Hepatic Function Panel:  Results from last 7 days   Lab Units 06/27/25 2157   ALK PHOS U/L 100   ALT U/L 3*   AST U/L 15   PROTEIN TOTAL g/dL 6.3*   BILIRUBIN TOTAL mg/dL 0.4       Magnesium:  Results from last 7 days   Lab Units 06/27/25  2157   MAGNESIUM mg/dL 1.95       Pro-BNP:  No results found for: \"PROBNP\"    INR:  Results from last 7 days   Lab Units 06/27/25  2157   PROTIME seconds 14.9*   INR  1.3*       TSH:  No results found for: \"TSH\"    Lipid Profile:        No lab exists for component: \"LABVLDL\"    HgbA1C:        Lactate Level:  No lab exists for component: \"LACTA\"    CMP:  Results from last 7 days   Lab Units 06/28/25  0555 06/27/25  2157   SODIUM mmol/L 134* 136   POTASSIUM mmol/L 4.5 3.8   CHLORIDE mmol/L 100 99   CO2 mmol/L 27 29   BUN mg/dL 23 21   CREATININE mg/dL 1.30* 1.39*   GLUCOSE mg/dL 153* 139* "   CALCIUM mg/dL 8.9 8.9   PROTEIN TOTAL g/dL  --  6.3*   BILIRUBIN TOTAL mg/dL  --  0.4   ALK PHOS U/L  --  100   AST U/L  --  15   ALT U/L  --  3*             [1] alum-mag hydroxide-simeth, 10 mL, oral, Nightly  [Held by provider] apixaban, 2.5 mg, oral, BID  busPIRone, 5 mg, oral, BID  carbidopa-levodopa, 2 tablet, oral, TID  cephalexin, 250 mg, oral, Daily  cetirizine, 10 mg, oral, Daily  cholecalciferol, 2,000 Units, oral, Daily  cyanocobalamin, 500 mcg, oral, Daily  [START ON 6/30/2025] digoxin, 125 mcg, oral, Once per day on Monday Wednesday Friday  docusate sodium, 100 mg, oral, BID  famotidine, 20 mg, oral, Daily  fLuvoxaMINE, 200 mg, oral, Nightly  folic acid, 1 mg, oral, Daily  lactobacillus acidophilus, 1 capsule, oral, Daily  levothyroxine, 75 mcg, oral, Daily  lubricating eye drops, 1 drop, Right Eye, TID  [2] PRN medications: acetaminophen **OR** acetaminophen **OR** acetaminophen, acetaminophen, acetaminophen, albuterol, BUPivacaine-EPINEPHrine, calcium carbonate, dextromethorphan-guaifenesin, guaiFENesin, hydrALAZINE, HYDROmorphone, HYDROmorphone, [Transfer Hold] HYDROmorphone, ipratropium-albuteroL, labetaloL, magnesium hydroxide, melatonin, ondansetron **OR** ondansetron, ondansetron, oxyCODONE, oxyCODONE, oxygen

## 2025-06-28 NOTE — CONSULTS
Reason For Consult  Left hip fracture    History Of Present Illness  Shanon Smith is a 89 y.o. female presenting with left hip pain after fall.  Patient presented from her long-term care facility with history notable for dementia, atrial fibrillation on long-term anticoagulation, chronic combined systolic and diastolic heart failure and Parkinson's disease.  Workup in the emergency room demonstrated displaced left peritrochanteric hip fracture.  Orthopedics was consulted for further evaluation.     Past Medical History  She has a past medical history of Alzheimer's disease with late onset (CODE) (05/05/2021), Arthritis of knee, left (03/11/2024), Asymmetric SNHL (sensorineural hearing loss) (03/11/2024), Clouded consciousness (03/11/2024), Diverticulosis (03/11/2024), Encounter for palliative care (04/26/2021), Encounter for palliative care (05/05/2021), History of colonic polyps (03/11/2024), Hyperlipidemia (03/11/2024), Inflammatory bowel disease (03/11/2024), Pancytopenia (03/11/2024), Parkinson's disease (12/14/2015), Personal history of other diseases of the circulatory system (04/26/2021), Personal history of other diseases of the circulatory system (05/05/2021), Personal history of other diseases of the nervous system and sense organs (04/26/2021), Personal history of other diseases of the nervous system and sense organs (05/05/2021), Personal history of other mental and behavioral disorders (07/16/2018), Personal history of other specified conditions (04/26/2021), PONV (postoperative nausea and vomiting), Primary osteoarthritis involving multiple joints (03/11/2024), Rectal hemorrhage (03/11/2024), Rectal prolapse (03/11/2024), Recurrent falls (11/14/2020), Thrombocytopenia (03/11/2024), and Unspecified abnormalities of gait and mobility (05/06/2015).    Surgical History  She has a past surgical history that includes Other surgical history (10/07/2022) and MR angio head wo IV contrast (9/14/2014).    "  Social History  She reports that she has never smoked. She has never used smokeless tobacco. She reports that she does not currently use alcohol. She reports that she does not use drugs.    Family History  Family History[1]     Allergies  Midazolam, Sulfamethoxazole-trimethoprim, and Erythromycin    Review of Systems  No fevers or chills.     Physical Exam  Moving bilateral upper extremities freely  Moving right lower extremity freely as well.  Left lower extremity is shortened and rotated relative to contralateral side.  Able to wiggle toes.  Patient demonstrates pain with any self attempted motion of her left leg.     Last Recorded Vitals  Blood pressure 133/52, pulse 88, temperature 36.4 °C (97.5 °F), temperature source Temporal, resp. rate 16, height (!) 1.499 m (4' 11\"), weight 70.5 kg (155 lb 8 oz), SpO2 97%.    Relevant Results      Scheduled medications  Scheduled Medications[2]  Continuous medications  Continuous Medications[3]  PRN medications  PRN Medications[4]  Results for orders placed or performed during the hospital encounter of 06/27/25 (from the past 24 hours)   CBC and Auto Differential   Result Value Ref Range    WBC 11.5 (H) 4.4 - 11.3 x10*3/uL    nRBC 0.0 0.0 - 0.0 /100 WBCs    RBC 4.09 4.00 - 5.20 x10*6/uL    Hemoglobin 12.8 12.0 - 16.0 g/dL    Hematocrit 37.5 36.0 - 46.0 %    MCV 92 80 - 100 fL    MCH 31.3 26.0 - 34.0 pg    MCHC 34.1 32.0 - 36.0 g/dL    RDW 13.5 11.5 - 14.5 %    Platelets 170 150 - 450 x10*3/uL    Neutrophils % 83.2 40.0 - 80.0 %    Immature Granulocytes %, Automated 0.8 0.0 - 0.9 %    Lymphocytes % 8.7 13.0 - 44.0 %    Monocytes % 6.3 2.0 - 10.0 %    Eosinophils % 0.7 0.0 - 6.0 %    Basophils % 0.3 0.0 - 2.0 %    Neutrophils Absolute 9.60 (H) 1.60 - 5.50 x10*3/uL    Immature Granulocytes Absolute, Automated 0.09 0.00 - 0.50 x10*3/uL    Lymphocytes Absolute 1.00 0.80 - 3.00 x10*3/uL    Monocytes Absolute 0.73 0.05 - 0.80 x10*3/uL    Eosinophils Absolute 0.08 0.00 - 0.40 " x10*3/uL    Basophils Absolute 0.04 0.00 - 0.10 x10*3/uL   Comprehensive metabolic panel   Result Value Ref Range    Glucose 139 (H) 74 - 99 mg/dL    Sodium 136 136 - 145 mmol/L    Potassium 3.8 3.5 - 5.3 mmol/L    Chloride 99 98 - 107 mmol/L    Bicarbonate 29 21 - 32 mmol/L    Anion Gap 12 10 - 20 mmol/L    Urea Nitrogen 21 6 - 23 mg/dL    Creatinine 1.39 (H) 0.50 - 1.05 mg/dL    eGFR 35 (L) >60 mL/min/1.73m*2    Calcium 8.9 8.6 - 10.3 mg/dL    Albumin 3.7 3.4 - 5.0 g/dL    Alkaline Phosphatase 100 33 - 136 U/L    Total Protein 6.3 (L) 6.4 - 8.2 g/dL    AST 15 9 - 39 U/L    Bilirubin, Total 0.4 0.0 - 1.2 mg/dL    ALT 3 (L) 7 - 45 U/L   Magnesium   Result Value Ref Range    Magnesium 1.95 1.60 - 2.40 mg/dL   Protime-INR   Result Value Ref Range    Protime 14.9 (H) 9.8 - 12.4 seconds    INR 1.3 (H) 0.9 - 1.1   APTT   Result Value Ref Range    aPTT 32 26 - 36 seconds   Type And Screen   Result Value Ref Range    ABO TYPE O     Rh TYPE POS     ANTIBODY SCREEN NEG    Basic metabolic panel   Result Value Ref Range    Glucose 153 (H) 74 - 99 mg/dL    Sodium 134 (L) 136 - 145 mmol/L    Potassium 4.5 3.5 - 5.3 mmol/L    Chloride 100 98 - 107 mmol/L    Bicarbonate 27 21 - 32 mmol/L    Anion Gap 12 10 - 20 mmol/L    Urea Nitrogen 23 6 - 23 mg/dL    Creatinine 1.30 (H) 0.50 - 1.05 mg/dL    eGFR 38 (L) >60 mL/min/1.73m*2    Calcium 8.9 8.6 - 10.3 mg/dL   CBC   Result Value Ref Range    WBC 9.9 4.4 - 11.3 x10*3/uL    nRBC 0.0 0.0 - 0.0 /100 WBCs    RBC 3.94 (L) 4.00 - 5.20 x10*6/uL    Hemoglobin 12.3 12.0 - 16.0 g/dL    Hematocrit 34.4 (L) 36.0 - 46.0 %    MCV 87 80 - 100 fL    MCH 31.2 26.0 - 34.0 pg    MCHC 35.8 32.0 - 36.0 g/dL    RDW 13.3 11.5 - 14.5 %    Platelets 158 150 - 450 x10*3/uL   Troponin I, High Sensitivity   Result Value Ref Range    Troponin I, High Sensitivity 26 (H) 0 - 13 ng/L   B-type natriuretic peptide   Result Value Ref Range     (H) 0 - 99 pg/mL   Troponin I, High Sensitivity   Result Value Ref  Range    Troponin I, High Sensitivity 29 (H) 0 - 13 ng/L        Assessment/Plan     Displaced left peritrochanteric femur fracture.    I reviewed diagnosis and radiographs with patient and her daughters, including her power of .  We discussed risks and benefits of nonoperative versus operative treatment options.  After thoroughly reviewing they would like to proceed with surgical intervention for her left hip fracture.  Informed consent reviewed and signed.  Questions addressed.  Plan to proceed with surgery today.    Jimbo Sidhu MD         [1]   Family History  Family history unknown: Yes   [2] alum-mag hydroxide-simeth, 10 mL, oral, Nightly  [Held by provider] apixaban, 2.5 mg, oral, BID  busPIRone, 5 mg, oral, BID  carbidopa-levodopa, 2 tablet, oral, TID  cephalexin, 250 mg, oral, Daily  cetirizine, 10 mg, oral, Daily  cholecalciferol, 2,000 Units, oral, Daily  cyanocobalamin, 500 mcg, oral, Daily  [START ON 6/30/2025] digoxin, 125 mcg, oral, Once per day on Monday Wednesday Friday  docusate sodium, 100 mg, oral, BID  famotidine, 20 mg, oral, Daily  fLuvoxaMINE, 200 mg, oral, Nightly  folic acid, 1 mg, oral, Daily  lactobacillus acidophilus, 1 capsule, oral, Daily  levothyroxine, 75 mcg, oral, Daily  lubricating eye drops, 1 drop, Right Eye, TID    [3]    [4] PRN medications: acetaminophen **OR** acetaminophen **OR** acetaminophen, acetaminophen, calcium carbonate, dextromethorphan-guaifenesin, guaiFENesin, [Transfer Hold] HYDROmorphone, ipratropium-albuteroL, magnesium hydroxide, melatonin, ondansetron **OR** ondansetron

## 2025-06-28 NOTE — H&P
History Of Present Illness  Shanon Smith is a 89 y.o. female, [facility], with history of dementia, atrial fibrillation on long-term anticoagulation with apixaban, chronic combined systolic and diastolic heart failure, and Parkinson's disease presenting with left hip pain following a [facility]. Pt is an unreliable historian, so additional information was obtained from daughter (over the phone) and from reviewing pertinent EMR and ECF notes and documentation. Pt is primarily in a wheel chair. Today, she tried to get out of wheel chair to get something from her closet when she fell and landed on left side. One of the resident's daughter found her on the floor and called for help on her behalf. Pt did complain of pain in left hip. EMS were called and she was transported to the ED. Imaging studies in ED revealed an intertrochanteric fracture of the left hip.       Past Medical History  Medical History[1]    Past Surgical History  Surgical History[2]     Social History  She reports that she has never smoked. She has never used smokeless tobacco. She reports that she does not currently use alcohol. She reports that she does not use drugs.    Family History  Positive for anxiety disorder and dementia     Allergies  Midazolam, Sulfamethoxazole-trimethoprim, and Erythromycin    Medications  Current Outpatient Medications   Medication Instructions    acetaminophen (TYLENOL) 650 mg, Every 6 hours PRN    alum-mag hydroxide-simeth (Maalox MAX) 400-400-40 mg/5 mL suspension 10 mL, Nightly    apixaban (ELIQUIS) 2.5 mg, 2 times daily    busPIRone (BUSPAR) 5 mg, 2 times daily    carbidopa-levodopa (Sinemet CR)  mg ER tablet 2 tablets, 3 times daily    cephalexin (KEFLEX) 250 mg, Daily    cholecalciferol, vitamin D3, 25 mcg (1,000 unit) tablet,chewable 2 tablets, Daily    cranberry extract 425 mg capsule 1 capsule, Daily    cyanocobalamin (VITAMIN B-12) 500 mcg, Daily    digoxin (Lanoxin) 125 MCG tablet 1 tablet, 4  times weekly    famotidine (PEPCID) 20 mg, Daily    fLuvoxaMINE (LUVOX) 200 mg, Nightly    folic acid (FOLVITE) 1 mg, Daily    Lactobacillus acidophilus 100 mg (1 billion cell) capsule 1 capsule, Daily    levothyroxine (TIROSINT) 75 mcg, Daily before breakfast    lubricating eye drops ophthalmic solution 1 drop, 3 times daily    magnesium hydroxide (Milk of Magnesia) 400 mg/5 mL suspension 30 mL, Daily PRN    melatonin 5 mg, Nightly PRN    ondansetron (ZOFRAN) 4 mg, Every 6 hours PRN    white petrolatum-mineral oiL (Tears Naturale PM) 94-3 % ointment ophthalmic ointment 1 Application, 3 times daily PRN         Review of Systems   Constitutional:  Positive for activity change.   HENT:  Positive for hearing loss.    Eyes: Negative.    Respiratory:  Negative for cough, shortness of breath and wheezing.    Cardiovascular:  Negative for chest pain and leg swelling.   Gastrointestinal:         Has rectal prolapse   Endocrine: Negative.    Genitourinary: Negative.    Musculoskeletal:         See HPI   Skin:  Positive for wound.   Allergic/Immunologic: Negative.    Neurological:  Positive for speech difficulty.   Hematological: Negative.    Psychiatric/Behavioral:  Positive for confusion.         Physical Exam  Constitutional:       General: She is not in acute distress.     Appearance: She is obese. She is not ill-appearing, toxic-appearing or diaphoretic.   HENT:      Head: Normocephalic and atraumatic.      Mouth/Throat:      Mouth: Mucous membranes are dry.      Pharynx: Oropharynx is clear. No oropharyngeal exudate or posterior oropharyngeal erythema.   Eyes:      General: No scleral icterus.        Right eye: No discharge.         Left eye: No discharge.   Cardiovascular:      Rate and Rhythm: Normal rate. Rhythm irregular.      Heart sounds: No murmur heard.  Pulmonary:      Breath sounds: No wheezing, rhonchi or rales.   Abdominal:      Palpations: There is no mass.      Tenderness: There is no abdominal tenderness.  "There is no guarding or rebound.   Genitourinary:     Comments: Rectal prolapse  Musculoskeletal:      Cervical back: Neck supple.      Right lower leg: No edema.      Left lower leg: No edema.      Comments: LROM in left hip secondary to pain   Lymphadenopathy:      Cervical: No cervical adenopathy.   Skin:     General: Skin is warm and dry.      Findings: Lesion present.      Comments: Small sacral ulcerative lesion present   Neurological:      General: No focal deficit present.      Mental Status: She is alert. She is disoriented.   Psychiatric:         Mood and Affect: Mood normal.          Last Recorded Vitals  Blood pressure 138/67, pulse 84, temperature 36 °C (96.8 °F), temperature source Temporal, resp. rate 16, height (!) 1.499 m (4' 11\"), weight 66.2 kg (146 lb), SpO2 94%.    Relevant Results   Latest Reference Range & Units 06/27/25 21:57   GLUCOSE 74 - 99 mg/dL 139 (H)   SODIUM 136 - 145 mmol/L 136   POTASSIUM 3.5 - 5.3 mmol/L 3.8   CHLORIDE 98 - 107 mmol/L 99   Bicarbonate 21 - 32 mmol/L 29   Anion Gap 10 - 20 mmol/L 12   Blood Urea Nitrogen 6 - 23 mg/dL 21   Creatinine 0.50 - 1.05 mg/dL 1.39 (H)   EGFR >60 mL/min/1.73m*2 35 (L)   Calcium 8.6 - 10.3 mg/dL 8.9   Albumin 3.4 - 5.0 g/dL 3.7   Alkaline Phosphatase 33 - 136 U/L 100   ALT 7 - 45 U/L 3 (L)   AST 9 - 39 U/L 15   Bilirubin Total 0.0 - 1.2 mg/dL 0.4   Total Protein 6.4 - 8.2 g/dL 6.3 (L)   MAGNESIUM 1.60 - 2.40 mg/dL 1.95   Protime 9.8 - 12.4 seconds 14.9 (H)   INR 0.9 - 1.1  1.3 (H)   aPTT 26 - 36 seconds 32   WBC 4.4 - 11.3 x10*3/uL 11.5 (H)   nRBC 0.0 - 0.0 /100 WBCs 0.0   RBC 4.00 - 5.20 x10*6/uL 4.09   HEMOGLOBIN 12.0 - 16.0 g/dL 12.8   HEMATOCRIT 36.0 - 46.0 % 37.5   MCV 80 - 100 fL 92   MCH 26.0 - 34.0 pg 31.3   MCHC 32.0 - 36.0 g/dL 34.1   RED CELL DISTRIBUTION WIDTH 11.5 - 14.5 % 13.5   Platelets 150 - 450 x10*3/uL 170   Neutrophils % 40.0 - 80.0 % 83.2   Immature Granulocytes %, Automated 0.0 - 0.9 % 0.8   Lymphocytes % 13.0 - 44.0 % " 8.7   Monocytes % 2.0 - 10.0 % 6.3   Eosinophils % 0.0 - 6.0 % 0.7   Basophils % 0.0 - 2.0 % 0.3   Neutrophils Absolute 1.60 - 5.50 x10*3/uL 9.60 (H)   Immature Granulocytes Absolute, Automated 0.00 - 0.50 x10*3/uL 0.09   Lymphocytes Absolute 0.80 - 3.00 x10*3/uL 1.00   Monocytes Absolute 0.05 - 0.80 x10*3/uL 0.73   Eosinophils Absolute 0.00 - 0.40 x10*3/uL 0.08   Basophils Absolute 0.00 - 0.10 x10*3/uL 0.04   (H): Data is abnormally high  (L): Data is abnormally low    XR PELVIS/LEFT FEMUR  IMPRESSION:  1. Comminuted, angulated left intertrochanteric femoral fracture.    XR CHEST  IMPRESSION:  1. Mild central vascular congestion.    CT LEFT FEMUR  IMPRESSION:  Intertrochanteric fracture of the left hip.  Assessment & Plan  Closed intertrochanteric fracture, left, initial encounter    Fall with intertrochanteric fracture of the left hip  - Admit to medical floor  - Pain control  - Ortho consulted and planning OR in the am  - Pt has no history of CAD and denies having any active ischemia symptoms  - Pt with RCRI score of 1 and 6% risk of major cardiac event  - She can proceed directly to surgery with that in mind  - Will check BNP, EKG and troponin  - Hold anticoagulation (apixaban). Last dose was this am    Atrial fibrillation  - Currently rate controlled  - Anticoagulation on hold in readiness for surgery    Parkinson's disease  - On carbidopa-levodopa    History of combined systolic and diastolic heart failure  - Clinically compensated  - BNP ordered    Code status  - Addressed with daughter/POA: Chitra Blancas  - Pt is DNR in the event of a cardiopulmonary arrest      I spent 75 minutes in the professional and overall care of this patient.      Ryan Felipe MD         [1]   Past Medical History:  Diagnosis Date    Alzheimer's disease with late onset (CODE) 05/05/2021    Late onset Alzheimer's dementia without behavioral disturbance    Arthritis of knee, left 03/11/2024    Asymmetric SNHL (sensorineural  hearing loss) 03/11/2024    Clouded consciousness 03/11/2024    Diverticulosis 03/11/2024    Encounter for palliative care 04/26/2021    Hospice care patient    Encounter for palliative care 05/05/2021    Hospice care patient    History of colonic polyps 03/11/2024    Hyperlipidemia 03/11/2024    Inflammatory bowel disease 03/11/2024    Pancytopenia 03/11/2024    Parkinson's disease 12/14/2015    Parkinsonism    Personal history of other diseases of the circulatory system 04/26/2021    History of atrial fibrillation    Personal history of other diseases of the circulatory system 05/05/2021    History of atrial fibrillation    Personal history of other diseases of the nervous system and sense organs 04/26/2021    History of Parkinson's disease    Personal history of other diseases of the nervous system and sense organs 05/05/2021    History of restless legs syndrome    Personal history of other mental and behavioral disorders 07/16/2018    History of dementia    Personal history of other specified conditions 04/26/2021    History of weakness    Primary osteoarthritis involving multiple joints 03/11/2024    Rectal hemorrhage 03/11/2024    Rectal prolapse 03/11/2024    Recurrent falls 11/14/2020    Formatting of this note might be different from the original. --fall precautions --PT/OT eval    Thrombocytopenia 03/11/2024    Unspecified abnormalities of gait and mobility 05/06/2015    Gait disturbance   [2]   Past Surgical History:  Procedure Laterality Date    MR HEAD ANGIO WO IV CONTRAST  9/14/2014    MR HEAD ANGIO WO IV CONTRAST [facility]    OTHER SURGICAL HISTORY  10/07/2022    Cholecystectomy

## 2025-06-28 NOTE — CARE PLAN
The patient's goals for the shift include pain control and sleep    The clinical goals for the shift include pain will be controlled <6 with current regimen this shift

## 2025-06-29 VITALS
HEIGHT: 59 IN | BODY MASS INDEX: 31.35 KG/M2 | WEIGHT: 155.5 LBS | DIASTOLIC BLOOD PRESSURE: 61 MMHG | TEMPERATURE: 97.3 F | HEART RATE: 112 BPM | RESPIRATION RATE: 18 BRPM | SYSTOLIC BLOOD PRESSURE: 117 MMHG | OXYGEN SATURATION: 97 %

## 2025-06-29 LAB
ANION GAP SERPL CALC-SCNC: 10 MMOL/L (ref 10–20)
BUN SERPL-MCNC: 29 MG/DL (ref 6–23)
CALCIUM SERPL-MCNC: 8.2 MG/DL (ref 8.6–10.3)
CHLORIDE SERPL-SCNC: 100 MMOL/L (ref 98–107)
CO2 SERPL-SCNC: 29 MMOL/L (ref 21–32)
CREAT SERPL-MCNC: 1.67 MG/DL (ref 0.5–1.05)
EGFRCR SERPLBLD CKD-EPI 2021: 28 ML/MIN/1.73M*2
ERYTHROCYTE [DISTWIDTH] IN BLOOD BY AUTOMATED COUNT: 13.6 % (ref 11.5–14.5)
ERYTHROCYTE [DISTWIDTH] IN BLOOD BY AUTOMATED COUNT: 13.7 % (ref 11.5–14.5)
GLUCOSE SERPL-MCNC: 127 MG/DL (ref 74–99)
HCT VFR BLD AUTO: 20.9 % (ref 36–46)
HCT VFR BLD AUTO: 25.8 % (ref 36–46)
HGB BLD-MCNC: 7 G/DL (ref 12–16)
HGB BLD-MCNC: 8.6 G/DL (ref 12–16)
MCH RBC QN AUTO: 30.8 PG (ref 26–34)
MCH RBC QN AUTO: 30.9 PG (ref 26–34)
MCHC RBC AUTO-ENTMCNC: 33.3 G/DL (ref 32–36)
MCHC RBC AUTO-ENTMCNC: 33.5 G/DL (ref 32–36)
MCV RBC AUTO: 92 FL (ref 80–100)
MCV RBC AUTO: 93 FL (ref 80–100)
NRBC BLD-RTO: 0 /100 WBCS (ref 0–0)
NRBC BLD-RTO: 0 /100 WBCS (ref 0–0)
PLATELET # BLD AUTO: 106 X10*3/UL (ref 150–450)
PLATELET # BLD AUTO: 124 X10*3/UL (ref 150–450)
POTASSIUM SERPL-SCNC: 4.3 MMOL/L (ref 3.5–5.3)
RBC # BLD AUTO: 2.27 X10*6/UL (ref 4–5.2)
RBC # BLD AUTO: 2.78 X10*6/UL (ref 4–5.2)
SODIUM SERPL-SCNC: 135 MMOL/L (ref 136–145)
WBC # BLD AUTO: 7.2 X10*3/UL (ref 4.4–11.3)
WBC # BLD AUTO: 9.5 X10*3/UL (ref 4.4–11.3)

## 2025-06-29 PROCEDURE — 80048 BASIC METABOLIC PNL TOTAL CA: CPT | Performed by: STUDENT IN AN ORGANIZED HEALTH CARE EDUCATION/TRAINING PROGRAM

## 2025-06-29 PROCEDURE — 85027 COMPLETE CBC AUTOMATED: CPT | Performed by: STUDENT IN AN ORGANIZED HEALTH CARE EDUCATION/TRAINING PROGRAM

## 2025-06-29 PROCEDURE — 36415 COLL VENOUS BLD VENIPUNCTURE: CPT | Performed by: STUDENT IN AN ORGANIZED HEALTH CARE EDUCATION/TRAINING PROGRAM

## 2025-06-29 PROCEDURE — 2500000005 HC RX 250 GENERAL PHARMACY W/O HCPCS: Performed by: ORTHOPAEDIC SURGERY

## 2025-06-29 PROCEDURE — 82374 ASSAY BLOOD CARBON DIOXIDE: CPT | Performed by: STUDENT IN AN ORGANIZED HEALTH CARE EDUCATION/TRAINING PROGRAM

## 2025-06-29 PROCEDURE — 2500000001 HC RX 250 WO HCPCS SELF ADMINISTERED DRUGS (ALT 637 FOR MEDICARE OP): Performed by: ORTHOPAEDIC SURGERY

## 2025-06-29 PROCEDURE — 2500000004 HC RX 250 GENERAL PHARMACY W/ HCPCS (ALT 636 FOR OP/ED): Performed by: ORTHOPAEDIC SURGERY

## 2025-06-29 PROCEDURE — 2500000002 HC RX 250 W HCPCS SELF ADMINISTERED DRUGS (ALT 637 FOR MEDICARE OP, ALT 636 FOR OP/ED): Performed by: ORTHOPAEDIC SURGERY

## 2025-06-29 PROCEDURE — 1200000002 HC GENERAL ROOM WITH TELEMETRY DAILY

## 2025-06-29 PROCEDURE — 99233 SBSQ HOSP IP/OBS HIGH 50: CPT | Performed by: STUDENT IN AN ORGANIZED HEALTH CARE EDUCATION/TRAINING PROGRAM

## 2025-06-29 PROCEDURE — 94760 N-INVAS EAR/PLS OXIMETRY 1: CPT

## 2025-06-29 PROCEDURE — 2500000001 HC RX 250 WO HCPCS SELF ADMINISTERED DRUGS (ALT 637 FOR MEDICARE OP): Performed by: STUDENT IN AN ORGANIZED HEALTH CARE EDUCATION/TRAINING PROGRAM

## 2025-06-29 PROCEDURE — 2500000004 HC RX 250 GENERAL PHARMACY W/ HCPCS (ALT 636 FOR OP/ED): Performed by: NURSE PRACTITIONER

## 2025-06-29 RX ORDER — BUSPIRONE HYDROCHLORIDE 5 MG/1
5 TABLET ORAL 2 TIMES DAILY
Status: ACTIVE | OUTPATIENT
Start: 2025-06-29

## 2025-06-29 RX ORDER — FLUVOXAMINE MALEATE 50 MG/1
200 TABLET, FILM COATED ORAL DAILY
Status: DISPENSED | OUTPATIENT
Start: 2025-06-29

## 2025-06-29 RX ORDER — CARBIDOPA AND LEVODOPA 25; 100 MG/1; MG/1
2 TABLET, EXTENDED RELEASE ORAL 3 TIMES DAILY
Status: ACTIVE | OUTPATIENT
Start: 2025-06-30

## 2025-06-29 RX ORDER — OXYCODONE HYDROCHLORIDE 5 MG/1
5 TABLET ORAL EVERY 6 HOURS PRN
Refills: 0 | Status: DISPENSED | OUTPATIENT
Start: 2025-06-29

## 2025-06-29 RX ORDER — SODIUM CHLORIDE, SODIUM LACTATE, POTASSIUM CHLORIDE, CALCIUM CHLORIDE 600; 310; 30; 20 MG/100ML; MG/100ML; MG/100ML; MG/100ML
100 INJECTION, SOLUTION INTRAVENOUS CONTINUOUS
Status: ACTIVE | OUTPATIENT
Start: 2025-06-29 | End: 2025-06-30

## 2025-06-29 RX ORDER — HYDROMORPHONE HYDROCHLORIDE 1 MG/ML
1 INJECTION, SOLUTION INTRAMUSCULAR; INTRAVENOUS; SUBCUTANEOUS
Status: ACTIVE | OUTPATIENT
Start: 2025-06-29

## 2025-06-29 RX ORDER — FLUVOXAMINE MALEATE 50 MG/1
200 TABLET, FILM COATED ORAL EVERY 24 HOURS
Status: CANCELLED | OUTPATIENT
Start: 2025-06-29

## 2025-06-29 RX ORDER — OXYCODONE HYDROCHLORIDE 10 MG/1
10 TABLET ORAL EVERY 4 HOURS PRN
Refills: 0 | Status: DISPENSED | OUTPATIENT
Start: 2025-06-29

## 2025-06-29 RX ORDER — DIGOXIN 125 MCG
125 TABLET ORAL 3 TIMES WEEKLY
Status: ACTIVE | OUTPATIENT
Start: 2025-06-30

## 2025-06-29 RX ADMIN — Medication 1 L/MIN: at 07:57

## 2025-06-29 RX ADMIN — Medication 2 L/MIN: at 00:18

## 2025-06-29 RX ADMIN — OXYCODONE HYDROCHLORIDE 5 MG: 5 TABLET ORAL at 23:45

## 2025-06-29 RX ADMIN — ACETAMINOPHEN 650 MG: 325 TABLET ORAL at 06:19

## 2025-06-29 RX ADMIN — Medication 2 L/MIN: at 20:10

## 2025-06-29 RX ADMIN — CEPHALEXIN 250 MG: 250 CAPSULE ORAL at 08:53

## 2025-06-29 RX ADMIN — CARBOXYMETHYLCELLULOSE SODIUM 1 DROP: 5 SOLUTION/ DROPS OPHTHALMIC at 21:05

## 2025-06-29 RX ADMIN — HYDROMORPHONE HYDROCHLORIDE 0.5 MG: 1 INJECTION, SOLUTION INTRAMUSCULAR; INTRAVENOUS; SUBCUTANEOUS at 08:56

## 2025-06-29 RX ADMIN — LEVOTHYROXINE SODIUM 75 MCG: 0.07 TABLET ORAL at 06:20

## 2025-06-29 RX ADMIN — DOCUSATE SODIUM 100 MG: 100 CAPSULE, LIQUID FILLED ORAL at 08:54

## 2025-06-29 RX ADMIN — CARBIDOPA AND LEVODOPA 2 TABLET: 25; 100 TABLET, EXTENDED RELEASE ORAL at 08:53

## 2025-06-29 RX ADMIN — PROMETHAZINE HYDROCHLORIDE 25 MG: 25 INJECTION INTRAMUSCULAR; INTRAVENOUS at 01:55

## 2025-06-29 RX ADMIN — DOCUSATE SODIUM 100 MG: 100 CAPSULE, LIQUID FILLED ORAL at 21:04

## 2025-06-29 RX ADMIN — BUSPIRONE HYDROCHLORIDE 5 MG: 5 TABLET ORAL at 17:11

## 2025-06-29 RX ADMIN — FAMOTIDINE 20 MG: 20 TABLET, FILM COATED ORAL at 08:54

## 2025-06-29 RX ADMIN — FLUVOXAMINE MALEATE 200 MG: 50 TABLET ORAL at 21:03

## 2025-06-29 RX ADMIN — CARBIDOPA AND LEVODOPA 2 TABLET: 25; 100 TABLET, EXTENDED RELEASE ORAL at 14:20

## 2025-06-29 RX ADMIN — ACETAMINOPHEN 650 MG: 325 TABLET ORAL at 23:45

## 2025-06-29 RX ADMIN — FOLIC ACID 1 MG: 1 TABLET ORAL at 08:54

## 2025-06-29 RX ADMIN — ALUMINUM HYDROXIDE, MAGNESIUM HYDROXIDE, AND DIMETHICONE 10 ML: 200; 20; 200 SUSPENSION ORAL at 21:08

## 2025-06-29 RX ADMIN — CEFAZOLIN SODIUM 2 G: 2 SOLUTION INTRAVENOUS at 06:21

## 2025-06-29 RX ADMIN — Medication 1 CAPSULE: at 08:53

## 2025-06-29 RX ADMIN — Medication 5 MG: at 23:45

## 2025-06-29 ASSESSMENT — COGNITIVE AND FUNCTIONAL STATUS - GENERAL
MOVING TO AND FROM BED TO CHAIR: A LOT
CLIMB 3 TO 5 STEPS WITH RAILING: TOTAL
EATING MEALS: A LOT
DRESSING REGULAR UPPER BODY CLOTHING: A LOT
TOILETING: TOTAL
TURNING FROM BACK TO SIDE WHILE IN FLAT BAD: A LOT
DRESSING REGULAR LOWER BODY CLOTHING: TOTAL
DAILY ACTIVITIY SCORE: 9
MOBILITY SCORE: 10
PERSONAL GROOMING: A LOT
MOVING FROM LYING ON BACK TO SITTING ON SIDE OF FLAT BED WITH BEDRAILS: A LOT
STANDING UP FROM CHAIR USING ARMS: A LOT
HELP NEEDED FOR BATHING: TOTAL
WALKING IN HOSPITAL ROOM: TOTAL

## 2025-06-29 ASSESSMENT — PAIN SCALES - PAIN ASSESSMENT IN ADVANCED DEMENTIA (PAINAD)
CONSOLABILITY: NO NEED TO CONSOLE
FACIALEXPRESSION: SMILING OR INEXPRESSIVE
BODYLANGUAGE: RELAXED
BREATHING: NORMAL
TOTALSCORE: 0
TOTALSCORE: COLD APPLIED;REPOSITIONED

## 2025-06-29 NOTE — CARE PLAN
The patient's goals for the shift include pain control and sleep    The clinical goals for the shift include pain control      Problem: Skin  Goal: Decreased wound size/increased tissue granulation at next dressing change  Flowsheets (Taken 6/28/2025 0054 by Wendy Kumar RN)  Decreased wound size/increased tissue granulation at next dressing change:   Promote sleep for wound healing   Protective dressings over bony prominences  Goal: Participates in plan/prevention/treatment measures  Flowsheets (Taken 6/28/2025 0054 by Wendy Kumar, RN)  Participates in plan/prevention/treatment measures:   Discuss with provider PT/OT consult   Elevate heels  Goal: Prevent/manage excess moisture  Flowsheets (Taken 6/28/2025 0054 by Wendy Kumar, RN)  Prevent/manage excess moisture: Cleanse incontinence/protect with barrier cream  Goal: Prevent/minimize sheer/friction injuries  Flowsheets (Taken 6/28/2025 0054 by Wendy Kuamr, RN)  Prevent/minimize sheer/friction injuries:   Complete micro-shifts as needed if patient unable. Adjust patient position to relieve pressure points, not a full turn   Use pull sheet   HOB 30 degrees or less   Turn/reposition every 2 hours/use positioning/transfer devices  Goal: Promote/optimize nutrition  Flowsheets (Taken 6/28/2025 0054 by Wendy Kumar, RN)  Promote/optimize nutrition:   Assist with feeding   Monitor/record intake including meals   Offer water/supplements/favorite foods  Goal: Promote skin healing  Flowsheets (Taken 6/28/2025 0054 by Wendy Kumar, RN)  Promote skin healing:   Protective dressings over bony prominences   Turn/reposition every 2 hours/use positioning/transfer devices

## 2025-06-29 NOTE — PROGRESS NOTES
Shanon Smith is a 89 y.o. female on day 2 of admission presenting with Closed intertrochanteric fracture, left, initial encounter.      Subjective   Patient was pulling at lines overnight so sitter was placed. Discussed with daughters including Chitra PARK. Discussed post-op YEN and Hgb drop and that we will monitor. POA consented for blood if needed.       Objective     Last Recorded Vitals  /61 (BP Location: Right arm)   Pulse 89   Temp 36.4 °C (97.5 °F) (Temporal)   Resp 18   Wt 70.5 kg (155 lb 8 oz)   SpO2 96%   Intake/Output last 3 Shifts:    Intake/Output Summary (Last 24 hours) at 6/29/2025 1409  Last data filed at 6/29/2025 1234  Gross per 24 hour   Intake 1563.33 ml   Output --   Net 1563.33 ml       Admission Weight  Weight: 66.2 kg (146 lb) (06/27/25 1956)    Daily Weight  06/27/25 : 70.5 kg (155 lb 8 oz)    Image Results  FL less than 1 hour  These images are not reportable by radiology and will not be interpreted   by  Radiologists.      Physical Exam  Constitutional:       Comments: Appears stated age, minimally interactive   HENT:      Mouth/Throat:      Mouth: Mucous membranes are moist.   Cardiovascular:      Rate and Rhythm: Normal rate and regular rhythm.   Pulmonary:      Effort: Pulmonary effort is normal. No respiratory distress.      Breath sounds: Normal breath sounds.   Abdominal:      General: Abdomen is flat. Bowel sounds are normal.      Palpations: Abdomen is soft.   Musculoskeletal:         General: No swelling, tenderness or deformity.   Skin:     General: Skin is warm and dry.   Neurological:      Mental Status: She is disoriented.         Relevant Results  Scheduled medications  Scheduled Medications[1]  Continuous medications  Continuous Medications[2]  PRN medications  PRN Medications[3]            Assessment & Plan  Closed intertrochanteric fracture, left, initial encounter    Fall with intertrochanteric fracture of the left hip  -Patient had a mechanical fall at  the skilled nursing facility.     Plan:  -Seen by orthopedic surgery, POD #1 from Left hip operative fixation   - Hold anticoagulation (apixaban). Last dose was Saturday am. Will continue to hold given Hgb drop  -Continue pain control    Anemia  Likely a combination of blood loss and dilution. No overt signs of bleeding or hematoma on my exam. Will recheck evening hgb with a type and screen. POA electronically consented for blood and is in the EMR.    YEN  Likely from poor PO intake.  Will monitor bladder scans and give IVF  Check Cr in am.     Atrial fibrillation  Troponin elevation  -Troponin elevation most likely related to mild renal disease and fall  -Currently no active chest pain, EKG shows no acute findings.  -Continue to monitor     Parkinson's disease  - On carbidopa-levodopa     History of combined systolic and diastolic heart failure  - Clinically compensated  - Continue to monitor     Code status  - Addressed with daughter/POA: Chitra Blancas  - Pt is DNR in the event of a cardiopulmonary arrest    Dispo: pending clinical course and PT/OT eval.           Britton Dean MD           [1] acetaminophen, 650 mg, oral, q6h NIURKA  alum-mag hydroxide-simeth, 10 mL, oral, Nightly  [Held by provider] apixaban, 2.5 mg, oral, BID  busPIRone, 5 mg, oral, BID  carbidopa-levodopa, 2 tablet, oral, TID  cephalexin, 250 mg, oral, Daily  cetirizine, 10 mg, oral, Daily  cholecalciferol, 2,000 Units, oral, Daily  cyanocobalamin, 500 mcg, oral, Daily  [START ON 6/30/2025] digoxin, 125 mcg, oral, Once per day on Monday Wednesday Friday  docusate sodium, 100 mg, oral, BID  famotidine, 20 mg, oral, Daily  fLuvoxaMINE, 200 mg, oral, Nightly  folic acid, 1 mg, oral, Daily  lactobacillus acidophilus, 1 capsule, oral, Daily  levothyroxine, 75 mcg, oral, Daily  lubricating eye drops, 1 drop, Right Eye, TID  [2] lactated Ringer's, 100 mL/hr, Last Rate: 100 mL/hr (06/29/25 1205)  oxygen, 2 L/min, Last Rate: 1 L/min (06/29/25 0757)  [3] PRN  medications: acetaminophen **OR** acetaminophen **OR** acetaminophen, acetaminophen, calcium carbonate, dextromethorphan-guaifenesin, guaiFENesin, HYDROmorphone, ipratropium-albuteroL, magnesium hydroxide, melatonin, naloxone, ondansetron **OR** ondansetron, oxyCODONE, oxyCODONE

## 2025-06-29 NOTE — PROGRESS NOTES
Physical Therapy                 Therapy Communication Note    Patient Name: Shanon Smith  MRN: [id number]  Department: [address]  Room: [address] [place] [address]  Today's Date: 6/29/2025     Discipline: Physical Therapy    Missed Visit: PT Missed Visit: Yes     Missed Visit Reason: Missed Visit Reason: Patient refused (Extended time at bedside while providing encouragement to participate with family support, however pt refused to participate in assessment. Nurse updated.)    Missed Time: Attempt    Comment: Extended time at bedside while providing encouragement to participate with family support, however pt refused to participate in assessment. Nurse and OT updated.

## 2025-06-29 NOTE — CARE PLAN
Per [facility] patient can start Nurtec 75 mg Daily #8  Medication was sent to the pharmacy.  Patient voiced her understanding The patient's goals for the shift include pain control  The clinical goals for the shift include pain control

## 2025-06-29 NOTE — PROGRESS NOTES
Occupational Therapy                 Therapy Communication Note    Patient Name: Shanon Smith  MRN: [id number]  Department: [address]  Room: [address] [place] [address]  Today's Date: 6/29/2025     Discipline: Occupational Therapy    Missed Visit: OT Missed Visit: Yes     Missed Visit Reason: Missed Visit Reason: Patient refused (Pt would not engage with therapy with caregivers present and encouragement pt still refused to participate with therapy at this time)    Missed Time: Attempt

## 2025-06-30 LAB
ABO GROUP (TYPE) IN BLOOD: NORMAL
ANION GAP SERPL CALC-SCNC: 8 MMOL/L (ref 10–20)
ANTIBODY SCREEN: NORMAL
ATRIAL RATE: 468 BPM
BUN SERPL-MCNC: 26 MG/DL (ref 6–23)
CALCIUM SERPL-MCNC: 8 MG/DL (ref 8.6–10.3)
CHLORIDE SERPL-SCNC: 101 MMOL/L (ref 98–107)
CO2 SERPL-SCNC: 29 MMOL/L (ref 21–32)
CREAT SERPL-MCNC: 1.33 MG/DL (ref 0.5–1.05)
EGFRCR SERPLBLD CKD-EPI 2021: 37 ML/MIN/1.73M*2
ERYTHROCYTE [DISTWIDTH] IN BLOOD BY AUTOMATED COUNT: 13.6 % (ref 11.5–14.5)
GLUCOSE SERPL-MCNC: 107 MG/DL (ref 74–99)
HCT VFR BLD AUTO: 20.1 % (ref 36–46)
HCT VFR BLD AUTO: 22.9 % (ref 36–46)
HGB BLD-MCNC: 6.8 G/DL (ref 12–16)
HGB BLD-MCNC: 7.7 G/DL (ref 12–16)
MCH RBC QN AUTO: 30.8 PG (ref 26–34)
MCHC RBC AUTO-ENTMCNC: 33.8 G/DL (ref 32–36)
MCV RBC AUTO: 91 FL (ref 80–100)
NRBC BLD-RTO: 0 /100 WBCS (ref 0–0)
PLATELET # BLD AUTO: 100 X10*3/UL (ref 150–450)
POTASSIUM SERPL-SCNC: 4.2 MMOL/L (ref 3.5–5.3)
Q ONSET: 220 MS
QRS COUNT: 13 BEATS
QRS DURATION: 78 MS
QT INTERVAL: 352 MS
QTC CALCULATION(BAZETT): 401 MS
QTC FREDERICIA: 384 MS
R AXIS: -31 DEGREES
RBC # BLD AUTO: 2.21 X10*6/UL (ref 4–5.2)
RH FACTOR (ANTIGEN D): NORMAL
SODIUM SERPL-SCNC: 134 MMOL/L (ref 136–145)
T AXIS: 150 DEGREES
T OFFSET: 396 MS
VENTRICULAR RATE: 78 BPM
WBC # BLD AUTO: 6.5 X10*3/UL (ref 4.4–11.3)

## 2025-06-30 PROCEDURE — 36430 TRANSFUSION BLD/BLD COMPNT: CPT

## 2025-06-30 PROCEDURE — 97161 PT EVAL LOW COMPLEX 20 MIN: CPT | Mod: GP

## 2025-06-30 PROCEDURE — 2500000001 HC RX 250 WO HCPCS SELF ADMINISTERED DRUGS (ALT 637 FOR MEDICARE OP): Performed by: INTERNAL MEDICINE

## 2025-06-30 PROCEDURE — 2500000002 HC RX 250 W HCPCS SELF ADMINISTERED DRUGS (ALT 637 FOR MEDICARE OP, ALT 636 FOR OP/ED): Performed by: ORTHOPAEDIC SURGERY

## 2025-06-30 PROCEDURE — 85027 COMPLETE CBC AUTOMATED: CPT | Performed by: STUDENT IN AN ORGANIZED HEALTH CARE EDUCATION/TRAINING PROGRAM

## 2025-06-30 PROCEDURE — 99232 SBSQ HOSP IP/OBS MODERATE 35: CPT | Performed by: INTERNAL MEDICINE

## 2025-06-30 PROCEDURE — 36415 COLL VENOUS BLD VENIPUNCTURE: CPT | Performed by: STUDENT IN AN ORGANIZED HEALTH CARE EDUCATION/TRAINING PROGRAM

## 2025-06-30 PROCEDURE — 97165 OT EVAL LOW COMPLEX 30 MIN: CPT | Mod: GO

## 2025-06-30 PROCEDURE — 1200000002 HC GENERAL ROOM WITH TELEMETRY DAILY

## 2025-06-30 PROCEDURE — P9016 RBC LEUKOCYTES REDUCED: HCPCS

## 2025-06-30 PROCEDURE — 2500000005 HC RX 250 GENERAL PHARMACY W/O HCPCS: Performed by: ORTHOPAEDIC SURGERY

## 2025-06-30 PROCEDURE — 2500000001 HC RX 250 WO HCPCS SELF ADMINISTERED DRUGS (ALT 637 FOR MEDICARE OP): Performed by: ORTHOPAEDIC SURGERY

## 2025-06-30 PROCEDURE — 85014 HEMATOCRIT: CPT | Performed by: INTERNAL MEDICINE

## 2025-06-30 PROCEDURE — 80048 BASIC METABOLIC PNL TOTAL CA: CPT | Performed by: STUDENT IN AN ORGANIZED HEALTH CARE EDUCATION/TRAINING PROGRAM

## 2025-06-30 PROCEDURE — 36415 COLL VENOUS BLD VENIPUNCTURE: CPT | Performed by: INTERNAL MEDICINE

## 2025-06-30 PROCEDURE — 2500000001 HC RX 250 WO HCPCS SELF ADMINISTERED DRUGS (ALT 637 FOR MEDICARE OP): Performed by: STUDENT IN AN ORGANIZED HEALTH CARE EDUCATION/TRAINING PROGRAM

## 2025-06-30 PROCEDURE — 99232 SBSQ HOSP IP/OBS MODERATE 35: CPT | Performed by: NURSE PRACTITIONER

## 2025-06-30 PROCEDURE — 94760 N-INVAS EAR/PLS OXIMETRY 1: CPT

## 2025-06-30 PROCEDURE — 86901 BLOOD TYPING SEROLOGIC RH(D): CPT | Performed by: INTERNAL MEDICINE

## 2025-06-30 RX ORDER — EAR PLUGS
1 EACH OTIC (EAR) 3 TIMES DAILY
Status: DISCONTINUED | OUTPATIENT
Start: 2025-06-30 | End: 2025-07-02 | Stop reason: HOSPADM

## 2025-06-30 RX ADMIN — CARBOXYMETHYLCELLULOSE SODIUM 1 DROP: 5 SOLUTION/ DROPS OPHTHALMIC at 15:41

## 2025-06-30 RX ADMIN — ACETAMINOPHEN 650 MG: 325 TABLET, FILM COATED ORAL at 12:25

## 2025-06-30 RX ADMIN — Medication 1 CAPSULE: at 09:43

## 2025-06-30 RX ADMIN — FAMOTIDINE 20 MG: 20 TABLET, FILM COATED ORAL at 09:40

## 2025-06-30 RX ADMIN — Medication 1 L/MIN: at 19:34

## 2025-06-30 RX ADMIN — LEVOTHYROXINE SODIUM 75 MCG: 75 TABLET ORAL at 06:59

## 2025-06-30 RX ADMIN — CARBIDOPA AND LEVODOPA 2 TABLET: 25; 100 TABLET, EXTENDED RELEASE ORAL at 09:37

## 2025-06-30 RX ADMIN — DOCUSATE SODIUM 100 MG: 100 CAPSULE, LIQUID FILLED ORAL at 09:40

## 2025-06-30 RX ADMIN — CYANOCOBALAMIN TAB 500 MCG 500 MCG: 500 TAB at 09:39

## 2025-06-30 RX ADMIN — FOLIC ACID 1 MG: 1 TABLET ORAL at 09:40

## 2025-06-30 RX ADMIN — ACETAMINOPHEN 650 MG: 325 TABLET, FILM COATED ORAL at 06:59

## 2025-06-30 RX ADMIN — FLUVOXAMINE MALEATE 200 MG: 50 TABLET ORAL at 12:26

## 2025-06-30 RX ADMIN — BUSPIRONE HYDROCHLORIDE 5 MG: 5 TABLET ORAL at 12:25

## 2025-06-30 RX ADMIN — CARBOXYMETHYLCELLULOSE SODIUM 1 DROP: 5 SOLUTION/ DROPS OPHTHALMIC at 09:43

## 2025-06-30 RX ADMIN — CARBIDOPA AND LEVODOPA 2 TABLET: 25; 100 TABLET, EXTENDED RELEASE ORAL at 15:41

## 2025-06-30 RX ADMIN — Medication 2000 UNITS: at 09:38

## 2025-06-30 RX ADMIN — CARBIDOPA AND LEVODOPA 2 TABLET: 25; 100 TABLET, EXTENDED RELEASE ORAL at 12:25

## 2025-06-30 RX ADMIN — CEPHALEXIN 250 MG: 250 CAPSULE ORAL at 09:44

## 2025-06-30 RX ADMIN — ACETAMINOPHEN 650 MG: 325 TABLET, FILM COATED ORAL at 17:27

## 2025-06-30 RX ADMIN — Medication 1 APPLICATION: at 22:10

## 2025-06-30 RX ADMIN — DIGOXIN 125 MCG: 125 TABLET ORAL at 12:25

## 2025-06-30 ASSESSMENT — COGNITIVE AND FUNCTIONAL STATUS - GENERAL
MOVING TO AND FROM BED TO CHAIR: TOTAL
HELP NEEDED FOR BATHING: TOTAL
CLIMB 3 TO 5 STEPS WITH RAILING: TOTAL
EATING MEALS: A LOT
DRESSING REGULAR LOWER BODY CLOTHING: TOTAL
TOILETING: TOTAL
HELP NEEDED FOR BATHING: TOTAL
STANDING UP FROM CHAIR USING ARMS: A LOT
DRESSING REGULAR LOWER BODY CLOTHING: TOTAL
TURNING FROM BACK TO SIDE WHILE IN FLAT BAD: A LOT
MOVING TO AND FROM BED TO CHAIR: A LOT
DAILY ACTIVITIY SCORE: 10
MOVING FROM LYING ON BACK TO SITTING ON SIDE OF FLAT BED WITH BEDRAILS: A LOT
TURNING FROM BACK TO SIDE WHILE IN FLAT BAD: TOTAL
DRESSING REGULAR UPPER BODY CLOTHING: A LOT
TOILETING: TOTAL
PERSONAL GROOMING: A LOT
PERSONAL GROOMING: A LOT
MOBILITY SCORE: 6
MOBILITY SCORE: 10
WALKING IN HOSPITAL ROOM: TOTAL
WALKING IN HOSPITAL ROOM: TOTAL
DRESSING REGULAR UPPER BODY CLOTHING: A LOT
STANDING UP FROM CHAIR USING ARMS: TOTAL
CLIMB 3 TO 5 STEPS WITH RAILING: TOTAL
EATING MEALS: A LITTLE
MOVING FROM LYING ON BACK TO SITTING ON SIDE OF FLAT BED WITH BEDRAILS: TOTAL
DAILY ACTIVITIY SCORE: 9

## 2025-06-30 ASSESSMENT — PAIN SCALES - PAIN ASSESSMENT IN ADVANCED DEMENTIA (PAINAD)
FACIALEXPRESSION: SMILING OR INEXPRESSIVE
TOTALSCORE: 3
NEGVOCALIZATION: OCCASIONAL MOAN/GROAN, LOW SPEECH, NEGATIVE/DISAPPROVING QUALITY
BODYLANGUAGE: TENSE, DISTRESSED PACING, FIDGETING
BREATHING: NORMAL
TOTALSCORE: REPOSITIONED;RELAXATION TECHNIQUE;REST
CONSOLABILITY: DISTRACTED OR REASSURED BY VOICE/TOUCH

## 2025-06-30 ASSESSMENT — PAIN SCALES - WONG BAKER
WONGBAKER_NUMERICALRESPONSE: HURTS LITTLE MORE
WONGBAKER_NUMERICALRESPONSE: HURTS LITTLE MORE

## 2025-06-30 ASSESSMENT — ACTIVITIES OF DAILY LIVING (ADL)
ADL_ASSISTANCE: NEEDS ASSISTANCE
BATHING_ASSISTANCE: TOTAL
ADL_ASSISTANCE: NEEDS ASSISTANCE

## 2025-06-30 ASSESSMENT — PAIN - FUNCTIONAL ASSESSMENT
PAIN_FUNCTIONAL_ASSESSMENT: WONG-BAKER FACES
PAIN_FUNCTIONAL_ASSESSMENT: 0-10
PAIN_FUNCTIONAL_ASSESSMENT: PAINAD (PAIN ASSESSMENT IN ADVANCED DEMENTIA SCALE)

## 2025-06-30 NOTE — PROGRESS NOTES
"Shanon Smith is a 89 y.o. female on day 3 of admission presenting with Closed intertrochanteric fracture, left, initial encounter.    Subjective   No acute overnight events.      Objective     Physical Exam  Vitals reviewed.   Constitutional:       Appearance: Normal appearance. She is ill-appearing.   HENT:      Head: Normocephalic and atraumatic.   Eyes:      Conjunctiva/sclera: Conjunctivae normal.      Pupils: Pupils are equal, round, and reactive to light.   Cardiovascular:      Rate and Rhythm: Normal rate and regular rhythm.      Pulses: Normal pulses.   Pulmonary:      Effort: Pulmonary effort is normal.   Abdominal:      Palpations: Abdomen is soft.   Musculoskeletal:      Cervical back: Neck supple.      Comments: Left lateral hip and thigh dressings in place, there is slight shadowing on the distal dressing, thigh and calf supple, negative Viv's, leg and foot warm and well perfused.  Quad recruitment strength 4/5, SILT S/S/SPN/DPN distributions, no foot drop, 5/5 dorsi and plantarflexion strength.     Skin:     General: Skin is warm and dry.      Capillary Refill: Capillary refill takes less than 2 seconds.      Coloration: Skin is pale.   Neurological:      General: No focal deficit present.      Mental Status: She is alert and oriented to person, place, and time.   Psychiatric:         Mood and Affect: Mood normal.         Behavior: Behavior normal.         Thought Content: Thought content normal.         Judgment: Judgment normal.       Last Recorded Vitals  Blood pressure (!) 103/49, pulse 90, temperature 36.5 °C (97.7 °F), temperature source Temporal, resp. rate 20, height (!) 1.499 m (4' 11\"), weight 70.5 kg (155 lb 8 oz), SpO2 99%.  Intake/Output last 3 Shifts:  I/O last 3 completed shifts:  In: 3398.3 (48.2 mL/kg) [P.O.:1460; I.V.:1938.3 (27.5 mL/kg)]  Out: 200 (2.8 mL/kg) [Urine:200 (0.1 mL/kg/hr)]  Weight: 70.5 kg     Relevant Results  FL less than 1 hour  Result Date: 6/28/2025  These " images are not reportable by radiology and will not be interpreted by  Radiologists.    CT femur left wo IV contrast  Result Date: 6/27/2025  STUDY: CT Extremity; Completed Time:  06/27/2025 9:13 PM INDICATION: Left lower extremity fracture evaluation. COMPARISON: None available. ACCESSION NUMBER(S): [id number] ORDERING CLINICIAN: MELVINA LINN TECHNIQUE:  Thin section axial images were obtained through the right lower extremity without intravenous contrast.  Orthogonal reconstructed images were obtained and reviewed. Automated mA/kV exposure control was utilized and patient examination was performed in strict accordance with principles of ALARA. FINDINGS:  Intertrochanteric fracture of the left hip.  Mild impaction.  There are moderate pubic symphyseal degenerative changes.  Superior and inferior rami intact. Mild joint effusion of the left hip.  Muscle and tendon structures, quadriceps, and hamstring muscles are unremarkable.  Vascular arterial calcification seen.  Advanced degenerative change and varus angulation of the left hip present. .    Intertrochanteric fracture of the left hip. Signed by Huey Floyd MD    XR pelvis 1-2 views  Result Date: 6/27/2025  Interpreted By:  Marcus Horton, STUDY: XR PELVIS 1-2 VIEWS; XR FEMUR LEFT 2+ VIEWS;  6/27/2025 8:54 pm   INDICATION: Signs/Symptoms:shortened left leg, s/p fall; Signs/Symptoms:fall.     COMPARISON: None.   ACCESSION NUMBER(S): [id number]; [id number]   ORDERING CLINICIAN: MELVINA LINN   FINDINGS: AP view pelvis and AP and lateral views of the left femur.   No pelvic fracture identified. Moderate degenerative changes of the bilateral hips and sacroiliac joints. Heavy aorto bi iliac vascular calcifications.   Comminuted left intertrochanteric femoral fracture. Slight proximal displacement and lateral angulation of the fracture site.       1. Comminuted, angulated left intertrochanteric femoral fracture.       Signed by: Marcus Horton 6/27/2025  9:11 PM Dictation workstation:   [id number]    XR femur left 2+ views  Result Date: 6/27/2025  Interpreted By:  Marcus Horton, STUDY: XR PELVIS 1-2 VIEWS; XR FEMUR LEFT 2+ VIEWS;  6/27/2025 8:54 pm   INDICATION: Signs/Symptoms:shortened left leg, s/p fall; Signs/Symptoms:fall.     COMPARISON: None.   ACCESSION NUMBER(S): [id number]; [id number]   ORDERING CLINICIAN: MELVINA LINN   FINDINGS: AP view pelvis and AP and lateral views of the left femur.   No pelvic fracture identified. Moderate degenerative changes of the bilateral hips and sacroiliac joints. Heavy aorto bi iliac vascular calcifications.   Comminuted left intertrochanteric femoral fracture. Slight proximal displacement and lateral angulation of the fracture site.       1. Comminuted, angulated left intertrochanteric femoral fracture.       Signed by: Marcus Horton 6/27/2025 9:11 PM Dictation workstation:   [id number]    XR chest 1 view  Result Date: 6/27/2025  Interpreted By:  Marcus Horton, STUDY: XR CHEST 1 VIEW;  6/27/2025 8:54 pm   INDICATION: Signs/Symptoms:fall.     COMPARISON: Chest radiograph 02/06/2015   ACCESSION NUMBER(S): [id number]   ORDERING CLINICIAN: MELVINA LINN   FINDINGS: Similar cardiomegaly. Calcifications of a tortuous thoracic aorta. Mild central vascular congestion. No focal consolidation, sizeable effusion, or discernible pneumothorax. No acute osseous abnormality identified. Unchanged sclerotic lesion in the left humeral head.       1. Mild central vascular congestion.     Signed by: Marcus Horton 6/27/2025 9:09 PM Dictation workstation:   [id number]    Scheduled medications  Scheduled Medications[1]  Continuous medications  Continuous Medications[2]  PRN medications  PRN Medications[3]  Results for orders placed or performed during the hospital encounter of 06/27/25 (from the past 24 hours)   CBC   Result Value Ref Range    WBC 7.2 4.4 - 11.3 x10*3/uL    nRBC 0.0 0.0 - 0.0 /100 WBCs    RBC 2.27 (L) 4.00 - 5.20  x10*6/uL    Hemoglobin 7.0 (L) 12.0 - 16.0 g/dL    Hematocrit 20.9 (L) 36.0 - 46.0 %    MCV 92 80 - 100 fL    MCH 30.8 26.0 - 34.0 pg    MCHC 33.5 32.0 - 36.0 g/dL    RDW 13.6 11.5 - 14.5 %    Platelets 106 (L) 150 - 450 x10*3/uL   CBC   Result Value Ref Range    WBC 6.5 4.4 - 11.3 x10*3/uL    nRBC 0.0 0.0 - 0.0 /100 WBCs    RBC 2.21 (L) 4.00 - 5.20 x10*6/uL    Hemoglobin 6.8 (L) 12.0 - 16.0 g/dL    Hematocrit 20.1 (L) 36.0 - 46.0 %    MCV 91 80 - 100 fL    MCH 30.8 26.0 - 34.0 pg    MCHC 33.8 32.0 - 36.0 g/dL    RDW 13.6 11.5 - 14.5 %    Platelets 100 (L) 150 - 450 x10*3/uL   Basic Metabolic Panel   Result Value Ref Range    Glucose 107 (H) 74 - 99 mg/dL    Sodium 134 (L) 136 - 145 mmol/L    Potassium 4.2 3.5 - 5.3 mmol/L    Chloride 101 98 - 107 mmol/L    Bicarbonate 29 21 - 32 mmol/L    Anion Gap 8 (L) 10 - 20 mmol/L    Urea Nitrogen 26 (H) 6 - 23 mg/dL    Creatinine 1.33 (H) 0.50 - 1.05 mg/dL    eGFR 37 (L) >60 mL/min/1.73m*2    Calcium 8.0 (L) 8.6 - 10.3 mg/dL   Type and screen   Result Value Ref Range    ABO TYPE O     Rh TYPE POS     ANTIBODY SCREEN NEG    Prepare RBC: 1 Units   Result Value Ref Range    PRODUCT CODE N2912R76     Unit Number [id number]     Unit ABO O     Unit RH POS     XM INTEP COMP     Dispense Status IS     Blood Expiration Date 7/2/2025 11:59:00 PM EDT     PRODUCT BLOOD TYPE 5100     UNIT VOLUME 350      Assessment & Plan  Closed intertrochanteric fracture, left, initial encounter    89 year old female POD 2 from [facility]    - AM labs reviewed: HgB 6.8 this AM - pending 1 unit PRBC - continue to monitor  - WBAT BLE  - PT/OT evaluations  - 24 hours Ancef completed  - start Eliquis once HgB stable  - SCDs  - follow up with Dr. Sidhu in 2 weeks for staple removal   - pain control and remainder of care per primary    Discussed with Dr. Sidhu        I spent 30 minutes in the professional and overall care of this patient.    Yoli Rosen, APRN-CNP           [1] acetaminophen, 650 mg,  oral, q6h [facility]  alum-mag hydroxide-simeth, 10 mL, oral, Nightly  [Held by provider] apixaban, 2.5 mg, oral, BID  busPIRone, 5 mg, oral, BID  carbidopa-levodopa, 2 tablet, oral, TID  cephalexin, 250 mg, oral, Daily  cetirizine, 10 mg, oral, Daily  cholecalciferol, 2,000 Units, oral, Daily  cyanocobalamin, 500 mcg, oral, Daily  digoxin, 125 mcg, oral, Once per day on Monday Wednesday Friday  docusate sodium, 100 mg, oral, BID  famotidine, 20 mg, oral, Daily  fLuvoxaMINE, 200 mg, oral, Daily  folic acid, 1 mg, oral, Daily  lactobacillus acidophilus, 1 capsule, oral, Daily  levothyroxine, 75 mcg, oral, Daily  lubricating eye drops, 1 drop, Right Eye, TID    [2] oxygen, 2 L/min, Last Rate: 2 L/min (06/29/25 2010)    [3] PRN medications: acetaminophen **OR** acetaminophen **OR** acetaminophen, acetaminophen, calcium carbonate, dextromethorphan-guaifenesin, guaiFENesin, HYDROmorphone, ipratropium-albuteroL, magnesium hydroxide, melatonin, naloxone, ondansetron **OR** ondansetron, oxyCODONE, oxyCODONE

## 2025-06-30 NOTE — PROGRESS NOTES
Shanon Smith is a 89 y.o. female on day 3 of admission presenting with Closed intertrochanteric fracture, left, initial encounter.      Subjective   No acute events overnight. She is quite somnolent today. Daughter is at the bedside and provides history.        Objective     Last Recorded Vitals  /62   Pulse (!) 111   Temp 36.7 °C (98.1 °F)   Resp 20   Wt 70.5 kg (155 lb 8 oz)   SpO2 93%   Intake/Output last 3 Shifts:    Intake/Output Summary (Last 24 hours) at 6/30/2025 1527  Last data filed at 6/30/2025 1338  Gross per 24 hour   Intake 2195 ml   Output 350 ml   Net 1845 ml       Admission Weight  Weight: 66.2 kg (146 lb) (06/27/25 1956)    Daily Weight  06/27/25 : 70.5 kg (155 lb 8 oz)    Image Results  ECG 12 lead  Atrial fibrillation  Left axis deviation  Minimal voltage criteria for LVH, may be normal variant  Nonspecific ST and T wave abnormality  Abnormal ECG  No previous ECGs available      Physical Exam  Constitutional:       Comments: Appears stated age, minimally interactive   HENT:      Mouth/Throat:      Mouth: Mucous membranes are moist.   Cardiovascular:      Rate and Rhythm: Normal rate and regular rhythm.   Pulmonary:      Effort: Pulmonary effort is normal. No respiratory distress.      Breath sounds: Normal breath sounds.   Abdominal:      General: Abdomen is flat. Bowel sounds are normal.      Palpations: Abdomen is soft.   Musculoskeletal:         General: No swelling, tenderness or deformity.   Skin:     General: Skin is warm and dry.   Neurological:      Mental Status: She is disoriented.      Comments: Somnolent           Relevant Results  Scheduled medications  Scheduled Medications[1]  Continuous medications  Continuous Medications[2]  PRN medications  PRN Medications[3]            Assessment & Plan  Closed intertrochanteric fracture, left, initial encounter    Fall with intertrochanteric fracture of the left hip  -Patient had a mechanical fall at the skilled nursing  facility.     Plan:  -Orthopedic surgery, POD #2 from Left hip operative fixation   - Hold anticoagulation (apixaban). Last dose was Saturday am. Will continue to hold given Hgb drop  - Continue pain control    Anemia  - Likely a combination of blood loss and dilution. No overt signs of bleeding or hematoma on my exam.   - s/p x1 unit PRBCs today  - Recheck evening hgb     YEN  - Likely from poor PO intake.  - Will monitor bladder scans and give IVF  - Check Cr in am.     Atrial fibrillation  Troponin elevation  -Troponin elevation most likely related to mild renal disease and fall  -Currently no active chest pain, EKG shows no acute findings.  -Continue to monitor     Parkinson's disease  - On carbidopa-levodopa     History of combined systolic and diastolic heart failure  - Clinically compensated  - Continue to monitor     Code status  - Addressed with daughter/POA: Chitra Blancas  - Pt is DNR in the event of a cardiopulmonary arrest    Dispo: pending clinical course and PT/OT eval.           Kyle Valdes,              [1] acetaminophen, 650 mg, oral, q6h [facility]  alum-mag hydroxide-simeth, 10 mL, oral, Nightly  [Held by provider] apixaban, 2.5 mg, oral, BID  busPIRone, 5 mg, oral, BID  carbidopa-levodopa, 2 tablet, oral, TID  cephalexin, 250 mg, oral, Daily  cetirizine, 10 mg, oral, Daily  cholecalciferol, 2,000 Units, oral, Daily  cyanocobalamin, 500 mcg, oral, Daily  digoxin, 125 mcg, oral, Once per day on Monday Wednesday Friday  docusate sodium, 100 mg, oral, BID  famotidine, 20 mg, oral, Daily  fLuvoxaMINE, 200 mg, oral, Daily  folic acid, 1 mg, oral, Daily  lactobacillus acidophilus, 1 capsule, oral, Daily  levothyroxine, 75 mcg, oral, Daily  lubricating eye drops, 1 drop, Right Eye, TID     [2] oxygen, 2 L/min, Last Rate: 2 L/min (06/29/25 2010)     [3] PRN medications: acetaminophen **OR** acetaminophen **OR** acetaminophen, acetaminophen, calcium carbonate, dextromethorphan-guaifenesin, guaiFENesin,  HYDROmorphone, ipratropium-albuteroL, magnesium hydroxide, melatonin, naloxone, ondansetron **OR** ondansetron, oxyCODONE, oxyCODONE

## 2025-06-30 NOTE — CONSULTS
Wound Care Consult     Visit Date: 6/30/2025      Patient Name: Shanon Smith         MRN: [id number]             Reason for Consult: wound        Wound History: closed intertrochanteric fracture, left    Assessment:Patient assessed with assist of PT, no wounds noted-  digital images incorrect, attempting to remove from chart-    -  Chronic lavender discoloration to buttock, external catheter leakage, removed, replaced, care provided. Butt paste ordered obtained to protect and promote local blood flow.  - B/L heels waxy RAB Waffle boots and CAVILON to protect.                         Wound Plan: Continue Q 2 hour turns.  Message with questions.      Ariadne Morales RN, [facility]  6/30/2025  7:36 PM

## 2025-06-30 NOTE — PROGRESS NOTES
Occupational Therapy    Evaluation    Patient Name: Shanon Smith  MRN: [id number]  Department: [address]  Room: [address] [place] [address]  Today's Date: 6/30/2025  Time Calculation  Start Time: 1310  Stop Time: 1327  Time Calculation (min): 17 min    Assessment  IP OT Assessment  OT Assessment: Pt is a 89 yo F referred to occupational therapy for impaired self-care and functional mobility 2/2 hospitalization for closed intertrochanteric fx, s/p L fixation 6/28, WBAT LLE. Pt demonstrates decreased ADL completion, functional mobility and transfers this date. Pt required max A for LE dressing and max A x2 for bed  mobility. Pt would continue to benefit from OT services at the MOD intensity level to increase safety and functional independence.  Prognosis: Good  Barriers to Discharge Home: Caregiver assistance, Physical needs  Caregiver Assistance: Caregiver assistance needed per identified barriers - however, level of patient's required assistance exceeds assistance available at home  Physical Needs: Returning to long-term care/other facility, 24hr mobility assistance needed, 24hr ADL assistance needed  Evaluation/Treatment Tolerance: Patient tolerated treatment well, Patient limited by fatigue  Medical Staff Made Aware: Yes  End of Session Communication: Bedside nurse  End of Session Patient Position: Bed, 3 rail up, Alarm on  Plan:  Treatment Interventions: ADL retraining, Functional transfer training, UE strengthening/ROM, Endurance training, Patient/family training, Cognitive reorientation, Equipment evaluation/education, Compensatory technique education  OT Frequency: 3 times per week (During this acute inpatient hospitalization.)  OT Discharge Recommendations: Moderate intensity level of continued care (Based on current functional status and rehab potential, patient is anticipated to tolerate and benefit from 5 or more days per week of skilled rehabilitative therapy after discharge from this acute inpatient hospitalization.)  OT  Recommended Transfer Status: Maximum assist, Assist of 2  OT - OK to Discharge: Yes (per OT POC)    Subjective   Current Problem:  1. Closed intertrochanteric fracture, left, initial encounter  Case Request Operating Room: Left hip operative fixation    Case Request Operating Room: Left hip operative fixation    FL less than 1 hour    FL less than 1 hour    CANCELED: Case Request Operating Room: Left hip operative fixation    CANCELED: Case Request Operating Room: Left hip operative fixation        OT Visit Info:  OT Received On: 06/30/25  General Visit Info:  General  Reason for Referral: Pt is a 89 yo F referred to occupational therapy for impaired self-care and functional mobility 2/2 hospitalization for closed intertrochanteric fx, s/p L fixation 6/28  Referred By: Jimbo Sidhu MD  Past Medical History Relevant to Rehab: dementia, atrial fibrillation on long-term anticoagulation with apixaban, chronic combined systolic and diastolic heart failure, and Parkinson's disease  Family/Caregiver Present: Yes (Daughter present)  Co-Treatment: PT  Co-Treatment Reason: To maximize pt safety and outcomes  Prior to Session Communication: Bedside nurse  Patient Position Received: Bed, 3 rail up, Alarm on  Preferred Learning Style: verbal, visual  General Comment: Pt agreeable to therapy session, opens eyes to visual and kinestetic stimuli. Pt cleared by RN prior to session  Precautions:  Hearing/Visual Limitations: [place]  LE Weight Bearing Status: Weight Bearing as Tolerated  Precautions Comment: margaret vera purewick    Pain:  Pain Assessment  Pain Assessment: 0-10  Pierre-Baker FACES Pain Rating: Hurts little more (Pt unable to state where pain is)  Pain Interventions: Repositioned, Ambulation/increased activity  Response to Interventions: Content/relaxed    Objective   Cognition:  Overall Cognitive Status: Impaired at baseline  Arousal/Alertness: Delayed responses to stimuli  Orientation Level: Disoriented to place,  Disoriented to time, Disoriented to situation    Home Living:  Type of Home: Long Term Care facility  Lives With: Alone  Home Adaptive Equipment: Wheelchair-manual, Walker rolling or standard  Home Layout: One level  Home Access: No concerns   Prior Function:  Level of Major: Needs assistance with ADLs, Needs assistance with homemaking  Receives Help From: Personal care attendant (Staff at LT)  ADL Assistance: Needs assistance (Per EMR, pt is dependent for all ADLs)  Homemaking Assistance: Needs assistance (Staff at [facility] completes)  Ambulatory Assistance: Needs assistance (Assist to transfer to wheelchair)    ADL:  Eating Assistance: Maximal  Grooming Assistance: Maximal  Bathing Assistance: Total  UE Dressing Assistance: Maximal  LE Dressing Assistance: Total  LE Dressing Deficit: Don/doff R sock, Don/doff L sock, Don/doff R shoe, Don/doff L shoe  Toileting Assistance with Device: Total  Toileting Deficit: Urinary Catheter  Functional Assistance: Maximal  ADL Comments: Pt required max A x2 for bed mobility for max A for LE dressing. Other ADLs anticipated.    Bed Mobility/Transfers:   Bed Mobility  Bed Mobility: Yes  Bed Mobility 1  Bed Mobility 1: Rolling right, Rolling left  Level of Assistance 1: Maximum assistance, +2  Bed Mobility 2  Bed Mobility  2: Supine to sitting  Level of Assistance 2: Maximum assistance, +2    Sitting Balance:  Static Sitting Balance  Static Sitting-Balance Support: Feet supported  Static Sitting-Level of Assistance: Maximum assistance  Dynamic Sitting Balance  Dynamic Sitting-Balance Support: Feet supported  Dynamic Sitting-Level of Assistance: Maximum assistance  Dynamic Sitting-Balance: Lateral lean (Posterior lean)    Sensation:  Light Touch: No apparent deficits  Strength:  Strength Comments: Not formally tested, BUE at least 3/5  Coordination:  Movements are Fluid and Coordinated: Yes   Hand Function:  Hand Function  Gross Grasp: Functional  Coordination:  Functional  Extremities:   RUE: Within Functional Limits (not formally tested)  LUE: Within Functional Limits (not formally tested)    Outcome Measures:   [facility] Daily Activity  Putting on and taking off regular lower body clothing: Total  Bathing (including washing, rinsing, drying): Total  Putting on and taking off regular upper body clothing: A lot  Toileting, which includes using toilet, bedpan or urinal: Total  Taking care of personal grooming such as brushing teeth: A lot  Eating Meals: A lot  Daily Activity - Total Score: 9    Education Documentation  Body Mechanics, taught by Ruchi Garcia OT at 6/30/2025  2:17 PM.  Learner: Patient  Readiness: Acceptance  Method: Explanation  Response: Verbalizes Understanding  Comment: Pt educated on POC, DC recs, safety and body mechanics when completing transfers and ADLs    Precautions, taught by Ruchi Garcia OT at 6/30/2025  2:17 PM.  Learner: Patient  Readiness: Acceptance  Method: Explanation  Response: Verbalizes Understanding  Comment: Pt educated on POC, DC recs, safety and body mechanics when completing transfers and ADLs    ADL Training, taught by Ruchi Garcia OT at 6/30/2025  2:17 PM.  Learner: Patient  Readiness: Acceptance  Method: Explanation  Response: Verbalizes Understanding  Comment: Pt educated on POC, DC recs, safety and body mechanics when completing transfers and ADLs    Goals:   Encounter Problems       Encounter Problems (Active)       ADLs       Patient will complete daily grooming tasks with moderate assist level of assistance and PRN adaptive equipment while supported sitting.       Start:  06/30/25    Expected End:  07/14/25               BALANCE       Pt will maintain dynamic seated balance during ADL task with minimal assist  level of assistance in order to demonstrate decreased risk of falling and improved postural control.       Start:  06/30/25    Expected End:  07/14/25               TRANSFERS       Patient will perform bed  mobility minimal assist  level of assistance and bed rails in order to improve safety and independence with mobility       Start:  06/30/25    Expected End:  07/14/25            Patient will complete functional transfer to chair/BSC/toilet with least restrictive device with maximal assist level of assistance.       Start:  06/30/25    Expected End:  07/14/25            Patient will complete sit to stand transfer with maximal assist level of assistance and least restrictive device in order to improve safety and prepare for out of bed mobility.       Start:  06/30/25    Expected End:  07/14/25

## 2025-06-30 NOTE — CARE PLAN
The patient's goals for the shift include comfort and sleep    The clinical goals for the shift include pt will rest comfortably and remain pleasant and cooperative this shift

## 2025-06-30 NOTE — PROGRESS NOTES
Physical Therapy    Physical Therapy Evaluation    Patient Name: Shanon Smith  MRN: [id number]  Department: [address]  Room: [address] [place] [address]  Today's Date: 6/30/2025   Time Calculation  Start Time: 1309  Stop Time: 1327  Time Calculation (min): 18 min    Assessment/Plan   PT Assessment  PT Assessment Results: Decreased strength, Decreased endurance, Impaired balance, Decreased mobility  Rehab Prognosis: Fair  Barriers to Discharge Home: Caregiver assistance, Physical needs  Caregiver Assistance: Caregiver assistance needed per identified barriers - however, level of patient's required assistance exceeds assistance available at home  Physical Needs: Intermittent mobility assistance needed, Intermittent ADL assistance needed, High falls risk due to function or environment  Evaluation/Treatment Tolerance: Patient limited by fatigue  Medical Staff Made Aware: Yes  Strengths: Housing layout, Support of Caregivers  Barriers to Participation: Ability to acquire knowledge, Comorbidities  End of Session Communication: Bedside nurse  Assessment Comment: Patient with decreased tolerance for functional mobility s/p fall and R hip fx. Patient does reside at LTC facility, has support from staff, owns walker and w/c. Patient would continue to benefit from MOD intensity PT intervention.  End of Session Patient Position: Bed, 3 rail up, Alarm on (ice to L hip, all needs within reach, dtr at bedside)  IP OR SWING BED PT PLAN  Inpatient or Swing Bed: Inpatient  PT Plan  Treatment/Interventions: Bed mobility, Transfer training, Gait training, Balance training, Strengthening, Endurance training  PT Plan: Ongoing PT  PT Frequency: 4 times per week  PT Discharge Recommendations: Moderate intensity level of continued care  Equipment Recommended upon Discharge: Wheeled walker, Wheelchair  PT Recommended Transfer Status: Assist x2  PT - OK to Discharge: Yes (per PT POC)    Subjective     PT Visit Info:  PT Received On: 06/30/25  General Visit  Information:  General  Reason for Referral: Impaired functional mobility; fall with L hip fx, repaired with cephalomedullary device  Referred By: Jimbo Sidhu MD  Past Medical History Relevant to Rehab: dementia, atrial fibrillation on long-term anticoagulation with apixaban, chronic combined systolic and diastolic heart failure, and Parkinson's disease  Family/Caregiver Present: Yes (Dtr)  Co-Treatment: OT  Co-Treatment Reason: Maximize patients functional mobility and outcomes  Prior to Session Communication: Bedside nurse  Patient Position Received: Bed, 3 rail up, Alarm on  General Comment: Pt agreeable to therapy session, opens eyes to visual and kinestetic stimuli. Pt cleared by RN prior to session  Home Living:  Home Living  Type of Home: Long Term Care facility  Lives With: Alone  Home Adaptive Equipment: Wheelchair-manual, Walker rolling or standard  Home Layout: One level  Home Access: No concerns  Prior Level of Function:  Prior Function Per Pt/Caregiver Report  Level of [place]: Needs assistance with ADLs, Needs assistance with homemaking  Receives Help From: Personal care attendant (LTC)  ADL Assistance: Needs assistance (Per EMR, pt is dependent for all ADLs)  Homemaking Assistance: Needs assistance (Assist from LTC staff)  Ambulatory Assistance: Needs assistance (Primarily transfers to/from w/c)  Precautions:  Precautions  Hearing/Visual Limitations: [place], wear hearing aides  LE Weight Bearing Status: Weight Bearing as Tolerated  Precautions Comment: margaret vera purewick (Patient received blood transfusion this morning, cleared by RN in PM)             Objective   Pain:  Pain Assessment  Pain Assessment: Pierre-Baker FACES  Pierre-Baker FACES Pain Rating: Hurts little more  Pain Interventions: Repositioned, Ambulation/increased activity  Cognition:  Cognition  Overall Cognitive Status: Impaired at baseline  Arousal/Alertness: Delayed responses to stimuli  Orientation Level: Disoriented to  place, Disoriented to time, Disoriented to situation    General Assessments:     Activity Tolerance  Endurance: Tolerates less than 10 min exercise, no significant change in vital signs    Sensation  Light Touch: No apparent deficits    Strength  Strength Comments: Functionally L hip 2/5, L knee and ankle 3/5, R LE 3/5    Coordination  Movements are Fluid and Coordinated: Yes    Postural Control  Postural Control: Within Functional Limits    Static Sitting Balance  Static Sitting-Balance Support: Bilateral upper extremity supported, Feet supported  Static Sitting-Level of Assistance: Maximum assistance       Functional Assessments:       Bed Mobility  Bed Mobility: Yes  Bed Mobility 1  Bed Mobility 1: Rolling right, Rolling left  Level of Assistance 1: Maximum assistance, +2  Bed Mobility 2  Bed Mobility  2: Supine to sitting, Sitting to supine  Level of Assistance 2: Maximum assistance, +2  Bed Mobility 3  Bed Mobility 3:  (Repositioning in bed)  Level of Assistance 3: Maximum assistance (x 2)    Transfers  Transfer: No (unable to safely complete at this time d/t patient fatigue)    Outcome Measures:  [facility] Basic Mobility  Turning from your back to your side while in a flat bed without using bedrails: Total  Moving from lying on your back to sitting on the side of a flat bed without using bedrails: Total  Moving to and from bed to chair (including a wheelchair): Total  Standing up from a chair using your arms (e.g. wheelchair or bedside chair): Total  To walk in hospital room: Total  Climbing 3-5 steps with railing: Total  Basic Mobility - Total Score: 6    Encounter Problems       Encounter Problems (Active)       Balance       STG - Maintains dynamic sitting balance without upper extremity support x 5' supervision  (Progressing)       Start:  06/30/25    Expected End:  07/14/25               Mobility       STG - Patient will ambulate with 2WW 10' min A  (Progressing)       Start:  06/30/25    Expected End:   07/14/25               PT Transfers          PT Transfers       STG - Patient will perform bed mobility min A  (Progressing)       Start:  06/30/25    Expected End:  07/14/25            STG - Patient will transfer sit to and from stand min A  (Progressing)       Start:  06/30/25    Expected End:  07/14/25                   Education Documentation  Precautions, taught by Lala Reddy PT at 6/30/2025  3:06 PM.  Learner: Family, Patient  Readiness: Acceptance  Method: Explanation  Response: Verbalizes Understanding, Needs Reinforcement  Comment: Importance of continued mobility with staff assist, ice for edema control, pillows placed for alignment and patient comfort    Mobility Training, taught by Lala Reddy PT at 6/30/2025  3:06 PM.  Learner: Family, Patient  Readiness: Acceptance  Method: Explanation  Response: Verbalizes Understanding, Needs Reinforcement  Comment: Importance of continued mobility with staff assist, ice for edema control, pillows placed for alignment and patient comfort    Education Comments  No comments found.

## 2025-06-30 NOTE — NURSING NOTE
Incorrect photos applied to this patient, removed from chart, Maddy PRITCHARD will retake digital images.  Message with questions.

## 2025-07-01 LAB
ANION GAP SERPL CALC-SCNC: 10 MMOL/L (ref 10–20)
BLOOD EXPIRATION DATE: NORMAL
BUN SERPL-MCNC: 22 MG/DL (ref 6–23)
CALCIUM SERPL-MCNC: 8.1 MG/DL (ref 8.6–10.3)
CHLORIDE SERPL-SCNC: 99 MMOL/L (ref 98–107)
CO2 SERPL-SCNC: 29 MMOL/L (ref 21–32)
CREAT SERPL-MCNC: 1.34 MG/DL (ref 0.5–1.05)
DISPENSE STATUS: NORMAL
EGFRCR SERPLBLD CKD-EPI 2021: 37 ML/MIN/1.73M*2
ERYTHROCYTE [DISTWIDTH] IN BLOOD BY AUTOMATED COUNT: 16 % (ref 11.5–14.5)
GLUCOSE SERPL-MCNC: 130 MG/DL (ref 74–99)
HCT VFR BLD AUTO: 26.4 % (ref 36–46)
HGB BLD-MCNC: 8.6 G/DL (ref 12–16)
MCH RBC QN AUTO: 29.9 PG (ref 26–34)
MCHC RBC AUTO-ENTMCNC: 32.6 G/DL (ref 32–36)
MCV RBC AUTO: 92 FL (ref 80–100)
NRBC BLD-RTO: 0 /100 WBCS (ref 0–0)
PLATELET # BLD AUTO: 125 X10*3/UL (ref 150–450)
POTASSIUM SERPL-SCNC: 4 MMOL/L (ref 3.5–5.3)
PRODUCT BLOOD TYPE: 5100
PRODUCT CODE: NORMAL
RBC # BLD AUTO: 2.88 X10*6/UL (ref 4–5.2)
SODIUM SERPL-SCNC: 134 MMOL/L (ref 136–145)
UNIT ABO: NORMAL
UNIT NUMBER: NORMAL
UNIT RH: NORMAL
UNIT VOLUME: 350
WBC # BLD AUTO: 6.8 X10*3/UL (ref 4.4–11.3)
XM INTEP: NORMAL

## 2025-07-01 PROCEDURE — 2500000002 HC RX 250 W HCPCS SELF ADMINISTERED DRUGS (ALT 637 FOR MEDICARE OP, ALT 636 FOR OP/ED): Performed by: ORTHOPAEDIC SURGERY

## 2025-07-01 PROCEDURE — 97530 THERAPEUTIC ACTIVITIES: CPT | Mod: GP,CQ

## 2025-07-01 PROCEDURE — 2500000001 HC RX 250 WO HCPCS SELF ADMINISTERED DRUGS (ALT 637 FOR MEDICARE OP): Performed by: STUDENT IN AN ORGANIZED HEALTH CARE EDUCATION/TRAINING PROGRAM

## 2025-07-01 PROCEDURE — 2500000004 HC RX 250 GENERAL PHARMACY W/ HCPCS (ALT 636 FOR OP/ED): Performed by: ORTHOPAEDIC SURGERY

## 2025-07-01 PROCEDURE — 85027 COMPLETE CBC AUTOMATED: CPT | Performed by: INTERNAL MEDICINE

## 2025-07-01 PROCEDURE — 36415 COLL VENOUS BLD VENIPUNCTURE: CPT | Performed by: INTERNAL MEDICINE

## 2025-07-01 PROCEDURE — 99232 SBSQ HOSP IP/OBS MODERATE 35: CPT | Performed by: INTERNAL MEDICINE

## 2025-07-01 PROCEDURE — 2500000001 HC RX 250 WO HCPCS SELF ADMINISTERED DRUGS (ALT 637 FOR MEDICARE OP): Performed by: ORTHOPAEDIC SURGERY

## 2025-07-01 PROCEDURE — 2500000002 HC RX 250 W HCPCS SELF ADMINISTERED DRUGS (ALT 637 FOR MEDICARE OP, ALT 636 FOR OP/ED): Performed by: INTERNAL MEDICINE

## 2025-07-01 PROCEDURE — 2500000005 HC RX 250 GENERAL PHARMACY W/O HCPCS: Performed by: ORTHOPAEDIC SURGERY

## 2025-07-01 PROCEDURE — 1100000001 HC PRIVATE ROOM DAILY

## 2025-07-01 PROCEDURE — 99232 SBSQ HOSP IP/OBS MODERATE 35: CPT | Performed by: NURSE PRACTITIONER

## 2025-07-01 PROCEDURE — 97110 THERAPEUTIC EXERCISES: CPT | Mod: GP,CQ

## 2025-07-01 PROCEDURE — 80048 BASIC METABOLIC PNL TOTAL CA: CPT | Performed by: INTERNAL MEDICINE

## 2025-07-01 RX ORDER — BISACODYL 5 MG
5 TABLET, DELAYED RELEASE (ENTERIC COATED) ORAL DAILY PRN
Status: DISCONTINUED | OUTPATIENT
Start: 2025-07-01 | End: 2025-07-02 | Stop reason: HOSPADM

## 2025-07-01 RX ORDER — QUETIAPINE FUMARATE 25 MG/1
25 TABLET, FILM COATED ORAL NIGHTLY
Status: DISCONTINUED | OUTPATIENT
Start: 2025-07-01 | End: 2025-07-02 | Stop reason: HOSPADM

## 2025-07-01 RX ADMIN — BUSPIRONE HYDROCHLORIDE 5 MG: 5 TABLET ORAL at 12:27

## 2025-07-01 RX ADMIN — CARBOXYMETHYLCELLULOSE SODIUM 1 DROP: 5 SOLUTION/ DROPS OPHTHALMIC at 16:13

## 2025-07-01 RX ADMIN — Medication 1 CAPSULE: at 09:10

## 2025-07-01 RX ADMIN — ONDANSETRON 4 MG: 2 INJECTION INTRAMUSCULAR; INTRAVENOUS at 17:56

## 2025-07-01 RX ADMIN — CYANOCOBALAMIN TAB 500 MCG 500 MCG: 500 TAB at 09:12

## 2025-07-01 RX ADMIN — DOCUSATE SODIUM 100 MG: 100 CAPSULE, LIQUID FILLED ORAL at 21:32

## 2025-07-01 RX ADMIN — LEVOTHYROXINE SODIUM 75 MCG: 75 TABLET ORAL at 09:06

## 2025-07-01 RX ADMIN — Medication 1 APPLICATION: at 09:18

## 2025-07-01 RX ADMIN — Medication 5 MG: at 21:32

## 2025-07-01 RX ADMIN — CARBOXYMETHYLCELLULOSE SODIUM 1 DROP: 5 SOLUTION/ DROPS OPHTHALMIC at 09:06

## 2025-07-01 RX ADMIN — Medication 2000 UNITS: at 09:10

## 2025-07-01 RX ADMIN — CEPHALEXIN 250 MG: 250 CAPSULE ORAL at 09:06

## 2025-07-01 RX ADMIN — Medication 1 APPLICATION: at 16:17

## 2025-07-01 RX ADMIN — OXYCODONE HYDROCHLORIDE 10 MG: 10 TABLET ORAL at 01:20

## 2025-07-01 RX ADMIN — CARBIDOPA AND LEVODOPA 2 TABLET: 25; 100 TABLET, EXTENDED RELEASE ORAL at 16:12

## 2025-07-01 RX ADMIN — Medication 1 APPLICATION: at 21:40

## 2025-07-01 RX ADMIN — FLUVOXAMINE MALEATE 200 MG: 50 TABLET ORAL at 12:26

## 2025-07-01 RX ADMIN — ACETAMINOPHEN 650 MG: 325 TABLET, FILM COATED ORAL at 17:59

## 2025-07-01 RX ADMIN — CARBIDOPA AND LEVODOPA 2 TABLET: 25; 100 TABLET, EXTENDED RELEASE ORAL at 12:27

## 2025-07-01 RX ADMIN — ALUMINUM HYDROXIDE, MAGNESIUM HYDROXIDE, AND DIMETHICONE 10 ML: 200; 20; 200 SUSPENSION ORAL at 21:32

## 2025-07-01 RX ADMIN — QUETIAPINE FUMARATE 25 MG: 25 TABLET ORAL at 21:32

## 2025-07-01 RX ADMIN — BUSPIRONE HYDROCHLORIDE 5 MG: 5 TABLET ORAL at 21:33

## 2025-07-01 RX ADMIN — MAGNESIUM HYDROXIDE 30 ML: 400 SUSPENSION ORAL at 09:20

## 2025-07-01 RX ADMIN — CARBOXYMETHYLCELLULOSE SODIUM 1 DROP: 5 SOLUTION/ DROPS OPHTHALMIC at 21:32

## 2025-07-01 RX ADMIN — ACETAMINOPHEN 650 MG: 325 TABLET, FILM COATED ORAL at 23:20

## 2025-07-01 RX ADMIN — CARBIDOPA AND LEVODOPA 2 TABLET: 25; 100 TABLET, EXTENDED RELEASE ORAL at 09:06

## 2025-07-01 RX ADMIN — ACETAMINOPHEN 650 MG: 325 TABLET, FILM COATED ORAL at 12:27

## 2025-07-01 RX ADMIN — FOLIC ACID 1 MG: 1 TABLET ORAL at 09:11

## 2025-07-01 ASSESSMENT — PAIN SCALES - PAIN ASSESSMENT IN ADVANCED DEMENTIA (PAINAD)
FACIALEXPRESSION: SMILING OR INEXPRESSIVE
BODYLANGUAGE: TENSE, DISTRESSED PACING, FIDGETING
TOTALSCORE: 3
FACIALEXPRESSION: SMILING OR INEXPRESSIVE
BREATHING: NORMAL
BODYLANGUAGE: RELAXED
CONSOLABILITY: DISTRACTED OR REASSURED BY VOICE/TOUCH
BREATHING: NORMAL
CONSOLABILITY: NO NEED TO CONSOLE
NEGVOCALIZATION: OCCASIONAL MOAN/GROAN, LOW SPEECH, NEGATIVE/DISAPPROVING QUALITY
TOTALSCORE: 1
NEGVOCALIZATION: OCCASIONAL MOAN/GROAN, LOW SPEECH, NEGATIVE/DISAPPROVING QUALITY
TOTALSCORE: MEDICATION (SEE MAR);REPOSITIONED;RELAXATION TECHNIQUE;REST

## 2025-07-01 ASSESSMENT — COGNITIVE AND FUNCTIONAL STATUS - GENERAL
HELP NEEDED FOR BATHING: TOTAL
DAILY ACTIVITIY SCORE: 10
WALKING IN HOSPITAL ROOM: TOTAL
EATING MEALS: A LITTLE
TURNING FROM BACK TO SIDE WHILE IN FLAT BAD: A LOT
DAILY ACTIVITIY SCORE: 10
DRESSING REGULAR LOWER BODY CLOTHING: TOTAL
WALKING IN HOSPITAL ROOM: TOTAL
MOVING TO AND FROM BED TO CHAIR: TOTAL
TURNING FROM BACK TO SIDE WHILE IN FLAT BAD: A LOT
TURNING FROM BACK TO SIDE WHILE IN FLAT BAD: A LOT
TOILETING: TOTAL
MOVING FROM LYING ON BACK TO SITTING ON SIDE OF FLAT BED WITH BEDRAILS: TOTAL
MOVING FROM LYING ON BACK TO SITTING ON SIDE OF FLAT BED WITH BEDRAILS: A LOT
CLIMB 3 TO 5 STEPS WITH RAILING: TOTAL
EATING MEALS: A LITTLE
WALKING IN HOSPITAL ROOM: TOTAL
DRESSING REGULAR UPPER BODY CLOTHING: A LOT
CLIMB 3 TO 5 STEPS WITH RAILING: TOTAL
CLIMB 3 TO 5 STEPS WITH RAILING: TOTAL
PERSONAL GROOMING: A LOT
STANDING UP FROM CHAIR USING ARMS: A LOT
MOBILITY SCORE: 10
MOVING TO AND FROM BED TO CHAIR: A LOT
STANDING UP FROM CHAIR USING ARMS: A LOT
MOBILITY SCORE: 10
HELP NEEDED FOR BATHING: TOTAL
DRESSING REGULAR UPPER BODY CLOTHING: A LOT
TOILETING: TOTAL
MOVING FROM LYING ON BACK TO SITTING ON SIDE OF FLAT BED WITH BEDRAILS: A LOT
STANDING UP FROM CHAIR USING ARMS: A LOT
MOBILITY SCORE: 8
MOVING TO AND FROM BED TO CHAIR: A LOT
PERSONAL GROOMING: A LOT
DRESSING REGULAR LOWER BODY CLOTHING: TOTAL

## 2025-07-01 ASSESSMENT — PAIN - FUNCTIONAL ASSESSMENT
PAIN_FUNCTIONAL_ASSESSMENT: UNABLE TO SELF-REPORT
PAIN_FUNCTIONAL_ASSESSMENT: PAINAD (PAIN ASSESSMENT IN ADVANCED DEMENTIA SCALE)
PAIN_FUNCTIONAL_ASSESSMENT: WONG-BAKER FACES

## 2025-07-01 ASSESSMENT — PAIN SCALES - WONG BAKER
WONGBAKER_NUMERICALRESPONSE: NO HURT
WONGBAKER_NUMERICALRESPONSE: HURTS LITTLE MORE

## 2025-07-01 ASSESSMENT — PAIN SCALES - GENERAL: PAINLEVEL_OUTOF10: 0 - NO PAIN

## 2025-07-01 NOTE — PROGRESS NOTES
"Shanon Smith is a 89 y.o. female on day 4 of admission presenting with Closed intertrochanteric fracture, left, initial encounter.    Subjective   No acute overnight events.      Objective     Physical Exam  Vitals reviewed.   Constitutional:       Appearance: Normal appearance. She is ill-appearing.   HENT:      Head: Normocephalic and atraumatic.   Eyes:      Conjunctiva/sclera: Conjunctivae normal.      Pupils: Pupils are equal, round, and reactive to light.   Cardiovascular:      Rate and Rhythm: Normal rate and regular rhythm.      Pulses: Normal pulses.   Pulmonary:      Effort: Pulmonary effort is normal.   Abdominal:      Palpations: Abdomen is soft.   Musculoskeletal:      Cervical back: Neck supple.      Comments: Left lateral hip and thigh dressings in place, there is slight shadowing on the distal dressing and moderate strikethrough on central incision, thigh and calf supple, large amount of ecchymosis of left thigh down to knee, negative Viv's, leg and foot warm and well perfused.  Quad recruitment strength 4/5, SILT S/S/SPN/DPN distributions, no foot drop, 5/5 dorsi and plantarflexion strength.     Skin:     General: Skin is warm and dry.      Capillary Refill: Capillary refill takes less than 2 seconds.      Coloration: Skin is pale.   Neurological:      General: No focal deficit present.      Mental Status: She is alert and oriented to person, place, and time.   Psychiatric:         Mood and Affect: Mood normal.         Behavior: Behavior normal.         Thought Content: Thought content normal.         Judgment: Judgment normal.       Last Recorded Vitals  Blood pressure 114/85, pulse 88, temperature 36.5 °C (97.7 °F), temperature source Temporal, resp. rate 20, height (!) 1.499 m (4' 11\"), weight 70.5 kg (155 lb 8 oz), SpO2 98%.  Intake/Output last 3 Shifts:  I/O last 3 completed shifts:  In: 2323.3 (32.9 mL/kg) [P.O.:760; I.V.:1155 (16.4 mL/kg); Blood:408.3]  Out: 525 (7.4 mL/kg) [Urine:525 " (0.2 mL/kg/hr)]  Weight: 70.5 kg     Relevant Results  FL less than 1 hour  Result Date: 6/28/2025  These images are not reportable by radiology and will not be interpreted by  Radiologists.    CT femur left wo IV contrast  Result Date: 6/27/2025  STUDY: CT Extremity; Completed Time:  06/27/2025 9:13 PM INDICATION: Left lower extremity fracture evaluation. COMPARISON: None available. ACCESSION NUMBER(S): [id number] ORDERING CLINICIAN: MELVINA LINN TECHNIQUE:  Thin section axial images were obtained through the right lower extremity without intravenous contrast.  Orthogonal reconstructed images were obtained and reviewed. Automated mA/kV exposure control was utilized and patient examination was performed in strict accordance with principles of ALARA. FINDINGS:  Intertrochanteric fracture of the left hip.  Mild impaction.  There are moderate pubic symphyseal degenerative changes.  Superior and inferior rami intact. Mild joint effusion of the left hip.  Muscle and tendon structures, quadriceps, and hamstring muscles are unremarkable.  Vascular arterial calcification seen.  Advanced degenerative change and varus angulation of the left hip present. .    Intertrochanteric fracture of the left hip. Signed by Huey Floyd MD    XR pelvis 1-2 views  Result Date: 6/27/2025  Interpreted By:  Marcus Horton, STUDY: XR PELVIS 1-2 VIEWS; XR FEMUR LEFT 2+ VIEWS;  6/27/2025 8:54 pm   INDICATION: Signs/Symptoms:shortened left leg, s/p fall; Signs/Symptoms:fall.     COMPARISON: None.   ACCESSION NUMBER(S): [id number]; [id number]   ORDERING CLINICIAN: MELVINA LINN   FINDINGS: AP view pelvis and AP and lateral views of the left femur.   No pelvic fracture identified. Moderate degenerative changes of the bilateral hips and sacroiliac joints. Heavy aorto bi iliac vascular calcifications.   Comminuted left intertrochanteric femoral fracture. Slight proximal displacement and lateral angulation of the fracture site.       1.  Comminuted, angulated left intertrochanteric femoral fracture.       Signed by: Marcus Horton 6/27/2025 9:11 PM Dictation workstation:   [id number]    XR femur left 2+ views  Result Date: 6/27/2025  Interpreted By:  Marcus Horton, STUDY: XR PELVIS 1-2 VIEWS; XR FEMUR LEFT 2+ VIEWS;  6/27/2025 8:54 pm   INDICATION: Signs/Symptoms:shortened left leg, s/p fall; Signs/Symptoms:fall.     COMPARISON: None.   ACCESSION NUMBER(S): [id number]; [id number]   ORDERING CLINICIAN: MELVINA LINN   FINDINGS: AP view pelvis and AP and lateral views of the left femur.   No pelvic fracture identified. Moderate degenerative changes of the bilateral hips and sacroiliac joints. Heavy aorto bi iliac vascular calcifications.   Comminuted left intertrochanteric femoral fracture. Slight proximal displacement and lateral angulation of the fracture site.       1. Comminuted, angulated left intertrochanteric femoral fracture.       Signed by: Marcus Horton 6/27/2025 9:11 PM Dictation workstation:   [id number]    XR chest 1 view  Result Date: 6/27/2025  Interpreted By:  Marcus Horton, STUDY: XR CHEST 1 VIEW;  6/27/2025 8:54 pm   INDICATION: Signs/Symptoms:fall.     COMPARISON: Chest radiograph 02/06/2015   ACCESSION NUMBER(S): [id number]   ORDERING CLINICIAN: MELVINA LINN   FINDINGS: Similar cardiomegaly. Calcifications of a tortuous thoracic aorta. Mild central vascular congestion. No focal consolidation, sizeable effusion, or discernible pneumothorax. No acute osseous abnormality identified. Unchanged sclerotic lesion in the left humeral head.       1. Mild central vascular congestion.     Signed by: Marcus Horton 6/27/2025 9:09 PM Dictation workstation:   [id number]    Scheduled medications  Scheduled Medications[1]  Continuous medications  Continuous Medications[2]  PRN medications  PRN Medications[3]  Results for orders placed or performed during the hospital encounter of 06/27/25 (from the past 24 hours)   Hemoglobin and  hematocrit, blood   Result Value Ref Range    Hemoglobin 7.7 (L) 12.0 - 16.0 g/dL    Hematocrit 22.9 (L) 36.0 - 46.0 %     Assessment & Plan  Closed intertrochanteric fracture, left, initial encounter    89 year old female POD 3 from [facility]    - AM labs reviewed: HgB 7.7 this AM - SP 1 unit PRBC  - WBAT BLE  - PT/OT evaluations  - 24 hours Ancef completed  - start Eliquis once HgB stable  - SCDs  - follow up with Dr. Sidhu in 2 weeks for staple removal   - pain control and remainder of care per primary  - ortho will sign off, please secure chat with any questions or concerns        I spent 30 minutes in the professional and overall care of this patient.    Yoli Rosen, APRN-CNP             [1] acetaminophen, 650 mg, oral, q6h NIURKA  alum-mag hydroxide-simeth, 10 mL, oral, Nightly  [Held by provider] apixaban, 2.5 mg, oral, BID  busPIRone, 5 mg, oral, BID  carbidopa-levodopa, 2 tablet, oral, TID  cephalexin, 250 mg, oral, Daily  cetirizine, 10 mg, oral, Daily  cholecalciferol, 2,000 Units, oral, Daily  cyanocobalamin, 500 mcg, oral, Daily  digoxin, 125 mcg, oral, Once per day on Monday Wednesday Friday  docusate sodium, 100 mg, oral, BID  famotidine, 20 mg, oral, Daily  fLuvoxaMINE, 200 mg, oral, Daily  folic acid, 1 mg, oral, Daily  lactobacillus acidophilus, 1 capsule, oral, Daily  levothyroxine, 75 mcg, oral, Daily  lubricating eye drops, 1 drop, Right Eye, TID  zinc oxide, 1 Application, Topical, TID     [2] oxygen, 2 L/min, Last Rate: 1 L/min (06/30/25 1934)     [3] PRN medications: acetaminophen **OR** acetaminophen **OR** acetaminophen, acetaminophen, calcium carbonate, dextromethorphan-guaifenesin, guaiFENesin, HYDROmorphone, ipratropium-albuteroL, magnesium hydroxide, melatonin, naloxone, ondansetron **OR** ondansetron, oxyCODONE, oxyCODONE

## 2025-07-01 NOTE — PROGRESS NOTES
Shanon Smith is a 89 y.o. female on day 4 of admission presenting with Closed intertrochanteric fracture, left, initial encounter.      Subjective   No acute events overnight although she was agitated and required PRN oxycodone for sedation. She is more awake, but still slightly confused. Daughter is at the bedside, questions answered.        Objective     Last Recorded Vitals  /85 (BP Location: Right arm, Patient Position: Lying)   Pulse 88   Temp 36.5 °C (97.7 °F) (Temporal)   Resp 20   Wt 70.5 kg (155 lb 8 oz)   SpO2 98%   Intake/Output last 3 Shifts:    Intake/Output Summary (Last 24 hours) at 7/1/2025 1557  Last data filed at 7/1/2025 1332  Gross per 24 hour   Intake 610 ml   Output 625 ml   Net -15 ml       Admission Weight  Weight: 66.2 kg (146 lb) (06/27/25 1956)    Daily Weight  06/27/25 : 70.5 kg (155 lb 8 oz)    Image Results  ECG 12 lead  Atrial fibrillation  Left axis deviation  Minimal voltage criteria for LVH, may be normal variant  Nonspecific ST and T wave abnormality  Abnormal ECG  No previous ECGs available      Physical Exam  Constitutional:       Comments: Appears stated age, minimally interactive   HENT:      Mouth/Throat:      Mouth: Mucous membranes are moist.   Cardiovascular:      Rate and Rhythm: Normal rate and regular rhythm.   Pulmonary:      Effort: Pulmonary effort is normal. No respiratory distress.      Breath sounds: Normal breath sounds.   Abdominal:      General: Abdomen is flat. Bowel sounds are normal.      Palpations: Abdomen is soft.   Musculoskeletal:         General: No swelling, tenderness or deformity.   Skin:     General: Skin is warm and dry.   Neurological:      Mental Status: She is disoriented.      Comments: Somnolent           Relevant Results  Scheduled medications  Scheduled Medications[1]  Continuous medications  Continuous Medications[2]  PRN medications  PRN Medications[3]            Assessment & Plan  Closed intertrochanteric fracture, left,  initial encounter    Fall with intertrochanteric fracture of the left hip  -Patient had a mechanical fall at the skilled nursing facility.     Plan:  -Orthopedic surgery, POD #3 from Left hip operative fixation   - Hold anticoagulation (apixaban). Last dose was Saturday am. Will continue to hold given Hgb drop  - Continue pain control    Anemia  - Likely a combination of blood loss and dilution. No overt signs of bleeding or hematoma on my exam.   - s/p x1 unit PRBCs today  - Recheck evening hgb     YEN  - Likely from poor PO intake.  - Will monitor bladder scans and give IVF  - Check Cr in am.     Atrial fibrillation  Troponin elevation  -Troponin elevation most likely related to mild renal disease and fall  -Currently no active chest pain, EKG shows no acute findings.  -Continue to monitor     Parkinson's disease  - On carbidopa-levodopa     History of combined systolic and diastolic heart failure  - Clinically compensated  - Continue to monitor     Code status  - Addressed with daughter/POA: Chitra Blancas  - Pt is DNR in the event of a cardiopulmonary arrest    Dispo: pending clinical course and PT/OT eval.           Kyle Valdes,                [1] acetaminophen, 650 mg, oral, q6h NIURKA  alum-mag hydroxide-simeth, 10 mL, oral, Nightly  [Held by provider] apixaban, 2.5 mg, oral, BID  busPIRone, 5 mg, oral, BID  carbidopa-levodopa, 2 tablet, oral, TID  cephalexin, 250 mg, oral, Daily  cetirizine, 10 mg, oral, Daily  cholecalciferol, 2,000 Units, oral, Daily  cyanocobalamin, 500 mcg, oral, Daily  digoxin, 125 mcg, oral, Once per day on Monday Wednesday Friday  docusate sodium, 100 mg, oral, BID  famotidine, 20 mg, oral, Daily  fLuvoxaMINE, 200 mg, oral, Daily  folic acid, 1 mg, oral, Daily  lactobacillus acidophilus, 1 capsule, oral, Daily  levothyroxine, 75 mcg, oral, Daily  lubricating eye drops, 1 drop, Right Eye, TID  zinc oxide, 1 Application, Topical, TID     [2] oxygen, 2 L/min, Last Rate: 1 L/min  (06/30/25 1934)     [3] PRN medications: acetaminophen **OR** acetaminophen **OR** acetaminophen, acetaminophen, calcium carbonate, dextromethorphan-guaifenesin, guaiFENesin, HYDROmorphone, ipratropium-albuteroL, magnesium hydroxide, melatonin, naloxone, ondansetron **OR** ondansetron, oxyCODONE, oxyCODONE

## 2025-07-01 NOTE — PROGRESS NOTES
07/01/25 0818   Discharge Planning   Living Arrangements Other (Comment)  ([facility] > 180 days)   Support Systems Children   Assistance Needed Patient is from [facility] (memory care), is A&OX2-3, dependent on assist for ADLs, uses a walker and wheelchair, is on room air at baseline-currently requiring O2, was on Eliquis prior to hospitalization, patient’s PCP is Noni Land MD. Spoke with patient’s daughter who is agreeable now for patient to return to [facility] to receive therapy and then transition back to LT. Patient’s daughter is asking for update from ortho CNP and hospitalist. Providers notified of daughters request via secure chat.   Type of Residence Skilled nursing facility   Do you have animals or pets at home? No   Who is requesting discharge planning? Provider   Home or Post Acute Services Post acute facilities (Rehab/SNF/etc)   Type of Post Acute Facility Services Skilled nursing   Expected Discharge Disposition SNF   Does the patient need discharge transport arranged? Yes   [facility] coordination needed? Yes   Has discharge transport been arranged? No   Patient Choice   Provider Choice list and CMS website (https://medicare.gov/care-compare#search) for post-acute Quality and Resource Measure Data were provided and reviewed with: Family   Patient / Family choosing to utilize agency / facility established prior to hospitalization Yes   Stroke Family Assessment   Stroke Family Assessment Needed No   Intensity of Service   Intensity of Service 0-30 min

## 2025-07-01 NOTE — PROGRESS NOTES
Physical Therapy    Physical Therapy Treatment    Patient Name: Shanon Smith  MRN: [id number]  Department: [address]  Room: [address] [place] [address]  Today's Date: 7/1/2025  Time Calculation  Start Time: 1120  Stop Time: 1145  Time Calculation (min): 25 min         Assessment/Plan   PT Assessment  PT Assessment Results: Decreased strength, Decreased endurance, Impaired balance, Decreased mobility  Rehab Prognosis: Fair  Barriers to Discharge Home: Caregiver assistance, Physical needs  Caregiver Assistance: Caregiver assistance needed per identified barriers - however, level of patient's required assistance exceeds assistance available at home  Physical Needs: Intermittent mobility assistance needed, Intermittent ADL assistance needed, High falls risk due to function or environment  End of Session Communication: Bedside nurse  Assessment Comment: Patient would continue to benefit from MOD intensity PT intervention to return to PLOF and safety.  End of Session Patient Position: Bed, 3 rail up, Alarm on (ice to L hip, all needs within reach, dtr at bedside)     PT Plan  Treatment/Interventions: Bed mobility, Transfer training, Gait training, Balance training, Strengthening, Endurance training  PT Plan: Ongoing PT  PT Frequency: 4 times per week (During this acute in patient hospital stay)  PT Discharge Recommendations: Moderate intensity level of continued care (Based on current functional status and rehab potential. Patient is anticipated to tolerate and benefit from 5 or more days per week of skilled rehabilitative therapy after discharge fro this acute inpatient hospitalization.)  Equipment Recommended upon Discharge: Wheeled walker, Wheelchair  PT Recommended Transfer Status: Assist x2, Total assist  PT - OK to Discharge: Yes    PT Visit Info:  PT Received On: 07/01/25     General Visit Information:   General  Reason for Referral: Impaired functional mobility; fall with L hip fx, repaired with cephalomedullary device  Referred  "By: Jimbo Sidhu MD  Past Medical History Relevant to Rehab: dementia, atrial fibrillation on long-term anticoagulation with apixaban, chronic combined systolic and diastolic heart failure, and Parkinson's disease  Family/Caregiver Present: Yes (Dtr)  Prior to Session Communication: Bedside nurse  Patient Position Received: Bed, 3 rail up, Alarm on  General Comment: AXOX1 noted only by her looking up and giving good eye contact to her name. L. hip fracture with CMN, WBAT no hip precautions, [place], daughter at bedside, eccymosis left hip, dressing D& I. on 1L po2 reading 100%. per nursing \"ok to leave o2 off\" (non verbal throughout tx, but able to following simple commands.)    Subjective   Precautions:  Precautions  Hearing/Visual Limitations: [place], wear hearing aides  LE Weight Bearing Status: Weight Bearing as Tolerated  Precautions Comment: jody, felipe robles (Patient received blood transfusion this morning, cleared by RN in PM)      Vital Signs Comment: po2 on RA remainted 95% and higher throughout tx.     Objective   Pain:  Pain Assessment  Pain Assessment: Pierre-Baker FACES  Pierre-Baker FACES Pain Rating: Hurts little more  Pain Type: Surgical pain  Pain Location: Hip  Pain Orientation: Right  Pain Interventions: Cold applied, Repositioned, Ambulation/increased activity, Emotional support  Response to Interventions: Absence of non-verbal indicators of pain  Cognition:  Cognition  Orientation Level: Disoriented to place, Disoriented to time, Disoriented to situation  Coordination:  Movements are Fluid and Coordinated: Yes  Postural Control:  Postural Control  Postural Control: Within Functional Limits  Static Sitting Balance  Static Sitting-Balance Support: Bilateral upper extremity supported, Feet supported  Static Sitting-Level of Assistance: Minimum assistance, Contact guard, Close supervision  Static Sitting-Comment/Number of Minutes: 10 minutes  Dynamic Sitting Balance  Dynamic Sitting-Balance " Support: Bilateral upper extremity supported (slight retro and right lateral lean)  Dynamic Sitting-Level of Assistance: Minimum assistance, Contact guard  Dynamic Sitting-Balance: Trunk control activities, Reaching for objects, Forward lean  Dynamic Sitting-Comments: brief intervals during 10 minute static sitting  Extremity/Trunk Assessments:    Activity Tolerance:  Activity Tolerance  Endurance: Tolerates 10 - 20 min exercise with multiple rests  Treatments:  Therapeutic Exercise  Therapeutic Exercise Performed: Yes  Therapeutic Exercise Activity 1: long sitting ankle pums and heel slides with Min A x10 reps, sititng long arc quqads x10 each with VC/TC.                             Bed Mobility  Bed Mobility: Yes  Bed Mobility 1  Bed Mobility 1: Rolling right, Rolling left  Level of Assistance 1: Maximum assistance, +2  Bed Mobility 2  Bed Mobility  2: Supine to sitting, Sitting to supine  Level of Assistance 2: +2, Moderate assistance, Moderate verbal cues, Moderate tactile cues  Bed Mobility 3  Bed Mobility 3:  (Repositioning in bed)  Level of Assistance 3:  (x 2)       Transfers  Transfer: Yes  Transfer 1  Transfer From 1: Sit to  Transfer to 1: Stand  Technique 1: Sit to stand, Stand to sit  Transfer Device 1:  (handheld x1)  Transfer Level of Assistance 1: Maximum assistance, Maximum tactile cues, Maximum verbal cues  Trials/Comments 1: x3                          Outcome Measures:  [facility] Basic Mobility  Turning from your back to your side while in a flat bed without using bedrails: Total  Moving from lying on your back to sitting on the side of a flat bed without using bedrails: A lot  Moving to and from bed to chair (including a wheelchair): Total  Standing up from a chair using your arms (e.g. wheelchair or bedside chair): A lot  To walk in hospital room: Total  Climbing 3-5 steps with railing: Total  Basic Mobility - Total Score: 8    Education Documentation  Precautions, taught by Belgica Guerrero PTA at  7/1/2025  1:53 PM.  Learner: Family  Readiness: Acceptance  Method: Explanation  Response: Verbalizes Understanding    Mobility Training, taught by Belgica Guerrero PTA at 7/1/2025  1:53 PM.  Learner: Family  Readiness: Acceptance  Method: Explanation  Response: Verbalizes Understanding    Body Mechanics, taught by Belgica Guerrero PTA at 7/1/2025  1:53 PM.  Learner: Family  Readiness: Acceptance  Method: Explanation  Response: Verbalizes Understanding    Precautions, taught by Belgica Guerrero PTA at 7/1/2025  1:53 PM.  Learner: Family  Readiness: Acceptance  Method: Explanation  Response: Verbalizes Understanding    ADL Training, taught by Belgica Guerrero PTA at 7/1/2025  1:53 PM.  Learner: Family  Readiness: Acceptance  Method: Explanation  Response: Verbalizes Understanding    Education Comments  No comments found.        OP EDUCATION:       Encounter Problems       Encounter Problems (Active)       Balance       STG - Maintains dynamic sitting balance without upper extremity support x 5' supervision  (Progressing)       Start:  06/30/25    Expected End:  07/14/25               Mobility       STG - Patient will ambulate with 2WW 10' min A  (Progressing)       Start:  06/30/25    Expected End:  07/14/25               PT Transfers          PT Transfers       STG - Patient will perform bed mobility min A  (Progressing)       Start:  06/30/25    Expected End:  07/14/25            STG - Patient will transfer sit to and from stand min A  (Progressing)       Start:  06/30/25    Expected End:  07/14/25

## 2025-07-01 NOTE — PROGRESS NOTES
07/01/25 1400   Discharge Planning   Assistance Needed Spoke with daughter at length regarding next site of care. Daughter continues to be agreeable to patient returning to [facility] and understands patient must remain sitter free for 24 hours before returning to [facility]. Facility was asked if patient will return to her room on the memory care unit and receive therapy or if she will be placed on the skilled unit temporarily to receive therapy. Awaiting facility response in order to notify daughter.

## 2025-07-01 NOTE — CARE PLAN
Pt was very agitated and irritable at beginning of shift. She pulled out her IV, purewick, and removed her telemetry. She removed her gown and kept trying to climb out of bed. She is extremely confused and non-redirectable. She was hitting and grabbing staff at this time as well. Darcy Ho NP wanted a new IV placed in pt d/t anemia. Pt was so impulsive and fidgety, there was no way she would leave a new IV in place. So, a new order was placed to have a sitter at the bedside to keep the patient safe, in bed, and help keep her IV and telemetry in place. Pt rested comfortably for the past 4 hours or so. Before that, she was awake and intermittently agitated. She is resting comfortably at this time. Sitter remains at bedside. VSS. Will continue to monitor.

## 2025-07-02 ENCOUNTER — PHARMACY VISIT (OUTPATIENT)
Dept: PHARMACY | Facility: CLINIC | Age: OVER 89
End: 2025-07-02
Payer: COMMERCIAL

## 2025-07-02 VITALS
TEMPERATURE: 96.8 F | DIASTOLIC BLOOD PRESSURE: 77 MMHG | SYSTOLIC BLOOD PRESSURE: 151 MMHG | BODY MASS INDEX: 31.35 KG/M2 | HEIGHT: 59 IN | WEIGHT: 155.5 LBS | RESPIRATION RATE: 16 BRPM | OXYGEN SATURATION: 92 % | HEART RATE: 97 BPM

## 2025-07-02 LAB
APPEARANCE UR: ABNORMAL
BACTERIA #/AREA URNS AUTO: ABNORMAL /HPF
BILIRUB UR STRIP.AUTO-MCNC: NEGATIVE MG/DL
COLOR UR: YELLOW
ERYTHROCYTE [DISTWIDTH] IN BLOOD BY AUTOMATED COUNT: 15.2 % (ref 11.5–14.5)
GLUCOSE UR STRIP.AUTO-MCNC: NORMAL MG/DL
HCT VFR BLD AUTO: 25 % (ref 36–46)
HGB BLD-MCNC: 8.3 G/DL (ref 12–16)
KETONES UR STRIP.AUTO-MCNC: NEGATIVE MG/DL
LEUKOCYTE ESTERASE UR QL STRIP.AUTO: ABNORMAL
MCH RBC QN AUTO: 30.2 PG (ref 26–34)
MCHC RBC AUTO-ENTMCNC: 33.2 G/DL (ref 32–36)
MCV RBC AUTO: 91 FL (ref 80–100)
NITRITE UR QL STRIP.AUTO: ABNORMAL
NRBC BLD-RTO: 0 /100 WBCS (ref 0–0)
PH UR STRIP.AUTO: 8 [PH]
PLATELET # BLD AUTO: 129 X10*3/UL (ref 150–450)
PROT UR STRIP.AUTO-MCNC: ABNORMAL MG/DL
RBC # BLD AUTO: 2.75 X10*6/UL (ref 4–5.2)
RBC # UR STRIP.AUTO: NEGATIVE MG/DL
RBC #/AREA URNS AUTO: ABNORMAL /HPF
SP GR UR STRIP.AUTO: 1.02
SQUAMOUS #/AREA URNS AUTO: ABNORMAL /HPF
UROBILINOGEN UR STRIP.AUTO-MCNC: ABNORMAL MG/DL
WBC # BLD AUTO: 4.8 X10*3/UL (ref 4.4–11.3)
WBC #/AREA URNS AUTO: ABNORMAL /HPF

## 2025-07-02 PROCEDURE — 2500000001 HC RX 250 WO HCPCS SELF ADMINISTERED DRUGS (ALT 637 FOR MEDICARE OP): Performed by: ORTHOPAEDIC SURGERY

## 2025-07-02 PROCEDURE — 2500000002 HC RX 250 W HCPCS SELF ADMINISTERED DRUGS (ALT 637 FOR MEDICARE OP, ALT 636 FOR OP/ED): Performed by: ORTHOPAEDIC SURGERY

## 2025-07-02 PROCEDURE — 2500000005 HC RX 250 GENERAL PHARMACY W/O HCPCS: Performed by: ORTHOPAEDIC SURGERY

## 2025-07-02 PROCEDURE — 2500000001 HC RX 250 WO HCPCS SELF ADMINISTERED DRUGS (ALT 637 FOR MEDICARE OP): Performed by: INTERNAL MEDICINE

## 2025-07-02 PROCEDURE — 2500000001 HC RX 250 WO HCPCS SELF ADMINISTERED DRUGS (ALT 637 FOR MEDICARE OP): Performed by: STUDENT IN AN ORGANIZED HEALTH CARE EDUCATION/TRAINING PROGRAM

## 2025-07-02 PROCEDURE — 99239 HOSP IP/OBS DSCHRG MGMT >30: CPT | Performed by: INTERNAL MEDICINE

## 2025-07-02 PROCEDURE — 36415 COLL VENOUS BLD VENIPUNCTURE: CPT | Performed by: INTERNAL MEDICINE

## 2025-07-02 PROCEDURE — 85027 COMPLETE CBC AUTOMATED: CPT | Performed by: INTERNAL MEDICINE

## 2025-07-02 PROCEDURE — RXMED WILLOW AMBULATORY MEDICATION CHARGE

## 2025-07-02 PROCEDURE — 81001 URINALYSIS AUTO W/SCOPE: CPT | Performed by: INTERNAL MEDICINE

## 2025-07-02 RX ORDER — CEPHALEXIN 500 MG/1
500 CAPSULE ORAL EVERY 8 HOURS SCHEDULED
Status: DISCONTINUED | OUTPATIENT
Start: 2025-07-02 | End: 2025-07-02 | Stop reason: HOSPADM

## 2025-07-02 RX ORDER — CEPHALEXIN 250 MG/1
250 CAPSULE ORAL DAILY
Qty: 30 CAPSULE | Refills: 0 | Status: SHIPPED | OUTPATIENT
Start: 2025-07-07

## 2025-07-02 RX ORDER — DOCUSATE SODIUM 100 MG/1
100 CAPSULE, LIQUID FILLED ORAL 2 TIMES DAILY
Qty: 30 CAPSULE | Refills: 0 | Status: SHIPPED | OUTPATIENT
Start: 2025-07-02

## 2025-07-02 RX ORDER — QUETIAPINE FUMARATE 25 MG/1
25 TABLET, FILM COATED ORAL NIGHTLY
Qty: 30 TABLET | Refills: 3 | Status: SHIPPED | OUTPATIENT
Start: 2025-07-02

## 2025-07-02 RX ORDER — LORAZEPAM 2 MG/ML
1 INJECTION INTRAMUSCULAR ONCE
Status: DISCONTINUED | OUTPATIENT
Start: 2025-07-02 | End: 2025-07-02

## 2025-07-02 RX ORDER — BISACODYL 5 MG
5 TABLET, DELAYED RELEASE (ENTERIC COATED) ORAL DAILY PRN
Qty: 20 TABLET | Refills: 0 | Status: SHIPPED | OUTPATIENT
Start: 2025-07-02

## 2025-07-02 RX ORDER — ACETAMINOPHEN 325 MG/1
650 TABLET ORAL EVERY 6 HOURS SCHEDULED
Qty: 30 TABLET | Refills: 0 | Status: SHIPPED | OUTPATIENT
Start: 2025-07-02

## 2025-07-02 RX ORDER — CEPHALEXIN 500 MG/1
500 CAPSULE ORAL 3 TIMES DAILY
Qty: 15 CAPSULE | Refills: 0 | Status: SHIPPED | OUTPATIENT
Start: 2025-07-02 | End: 2025-07-07

## 2025-07-02 RX ADMIN — CARBIDOPA AND LEVODOPA 2 TABLET: 25; 100 TABLET, EXTENDED RELEASE ORAL at 08:22

## 2025-07-02 RX ADMIN — CYANOCOBALAMIN TAB 500 MCG 500 MCG: 500 TAB at 08:22

## 2025-07-02 RX ADMIN — CARBIDOPA AND LEVODOPA 2 TABLET: 25; 100 TABLET, EXTENDED RELEASE ORAL at 13:05

## 2025-07-02 RX ADMIN — FOLIC ACID 1 MG: 1 TABLET ORAL at 08:21

## 2025-07-02 RX ADMIN — CEPHALEXIN 500 MG: 250 CAPSULE ORAL at 13:02

## 2025-07-02 RX ADMIN — Medication 2000 UNITS: at 08:21

## 2025-07-02 RX ADMIN — Medication 1 CAPSULE: at 08:21

## 2025-07-02 RX ADMIN — LEVOTHYROXINE SODIUM 75 MCG: 75 TABLET ORAL at 08:52

## 2025-07-02 RX ADMIN — CARBOXYMETHYLCELLULOSE SODIUM 1 DROP: 5 SOLUTION/ DROPS OPHTHALMIC at 08:28

## 2025-07-02 RX ADMIN — FAMOTIDINE 20 MG: 20 TABLET, FILM COATED ORAL at 08:21

## 2025-07-02 RX ADMIN — Medication 1 APPLICATION: at 09:24

## 2025-07-02 RX ADMIN — DIGOXIN 125 MCG: 125 TABLET ORAL at 13:42

## 2025-07-02 RX ADMIN — DOCUSATE SODIUM 100 MG: 100 CAPSULE, LIQUID FILLED ORAL at 08:22

## 2025-07-02 RX ADMIN — CEPHALEXIN 250 MG: 250 CAPSULE ORAL at 08:22

## 2025-07-02 RX ADMIN — BUSPIRONE HYDROCHLORIDE 5 MG: 5 TABLET ORAL at 13:05

## 2025-07-02 NOTE — NURSING NOTE
Pt left via ambulance.  After visit summary and folder given to EMTs.  No answer when attempted to call nurse to nurse.  All belongings left with patient's daughter.

## 2025-07-02 NOTE — PROGRESS NOTES
07/02/25 0913   Discharge Planning   Living Arrangements Other (Comment)  ([facility] > 180 days)   Support Systems Children   Assistance Needed Patient is from [facility] (memory care), is A&OX2-3, dependent on assist for ADLs, uses a walker and wheelchair, is on room air at baseline-currently requiring O2, was on Eliquis prior to hospitalization, patient’s PCP is Noni Land MD. Spoke with patient’s daughter who is agreeable now for patient to return to [facility] to receive therapy and then transition back to LT. Patient continues to be sitter free since 7/1 at 1 PM. Physician notified that patient is able to return to [facility] at 1 PM if she continues to remain sitter free. Patient will receive her skilled therapy in the memory care unit per facility and daughter was notified of this by this TCC.   Type of Residence [facility] nursing facility   Do you have animals or pets at home? No   Who is requesting discharge planning? Provider   Home or Post Acute Services Post acute facilities (Rehab/SNF/etc)   Type of Post Acute Facility Services Skilled nursing   Expected Discharge Disposition SNF   Does the patient need discharge transport arranged? Yes   [facility] coordination needed? Yes   Has discharge transport been arranged? No

## 2025-07-02 NOTE — CARE PLAN
Problem: Pain - Adult  Goal: Verbalizes/displays adequate comfort level or baseline comfort level  Outcome: Progressing     Problem: Safety - Adult  Goal: Free from fall injury  Outcome: Progressing     Problem: Discharge Planning  Goal: Discharge to home or other facility with appropriate resources  Outcome: Progressing     Problem: Chronic Conditions and Co-morbidities  Goal: Patient's chronic conditions and co-morbidity symptoms are monitored and maintained or improved  Outcome: Progressing     Problem: Nutrition  Goal: Nutrient intake appropriate for maintaining nutritional needs  Outcome: Progressing     Problem: Skin  Goal: Decreased wound size/increased tissue granulation at next dressing change  Outcome: Progressing  Goal: Participates in plan/prevention/treatment measures  Outcome: Progressing  Goal: Prevent/manage excess moisture  Outcome: Progressing  Goal: Prevent/minimize sheer/friction injuries  Outcome: Progressing  Goal: Promote/optimize nutrition  Outcome: Progressing  Goal: Promote skin healing  Outcome: Progressing  Flowsheets (Taken 7/1/2025 2244)  Promote skin healing:   Turn/reposition every 2 hours/use positioning/transfer devices   Protective dressings over bony prominences   Assess skin/pad under line(s)/device(s)   The patient's goals for the shift include pain control and sleep    The clinical goals for the shift include remain safe and free from falls

## 2025-07-03 NOTE — DISCHARGE SUMMARY
Discharge Diagnosis  Closed intertrochanteric fracture, left, initial encounter       Issues Requiring Follow-Up  Rehab at [facility]  Follow up with PCP    Discharge Meds     Medication List      START taking these medications     bisacodyl 5 mg EC tablet; Commonly known as: Dulcolax; Take 1 tablet (5   mg) by mouth once daily as needed for constipation. Do not crush, chew, or   split.   docusate sodium 100 mg capsule; Commonly known as: Colace; Take 1   capsule (100 mg) by mouth 2 times a day.   QUEtiapine 25 mg tablet; Commonly known as: SEROquel; Take 1 tablet (25   mg) by mouth once daily at bedtime.     CHANGE how you take these medications     * acetaminophen 325 mg tablet; Commonly known as: Tylenol; What changed:   Another medication with the same name was added. Make sure you understand   how and when to take each.   * acetaminophen 325 mg tablet; Commonly known as: Tylenol; Take 2   tablets (650 mg) by mouth every 6 hours.; What changed: You were already   taking a medication with the same name, and this prescription was added.   Make sure you understand how and when to take each.   * cephalexin 500 mg capsule; Commonly known as: Keflex; Take 1 capsule   (500 mg) by mouth 3 times a day for 5 days.; What changed: You were   already taking a medication with the same name, and this prescription was   added. Make sure you understand how and when to take each.   * cephalexin 250 mg capsule; Commonly known as: Keflex; Take 1 capsule   (250 mg) by mouth once daily. Ppx atb Do not fill before July 7, 2025.( To   e started after completing cephalexin 500 mg); Start taking on: July 7, 2025; What changed: additional instructions, These instructions start on   July 7, 2025. If you are unsure what to do until then, ask your doctor or   other care provider.  * This list has 4 medication(s) that are the same as other medications   prescribed for you. Read the directions carefully, and ask your doctor or   other care provider  to review them with you.     CONTINUE taking these medications     alum-mag hydroxide-simeth 400-400-40 mg/5 mL suspension; Commonly known   as: Maalox MAX   apixaban 2.5 mg tablet; Commonly known as: Eliquis   busPIRone 5 mg tablet; Commonly known as: Buspar   carbidopa-levodopa  mg ER tablet; Commonly known as: Sinemet CR   cholecalciferol (vitamin D3) 25 mcg (1,000 unit) tablet,chewable   cranberry extract 425 mg capsule   cyanocobalamin 500 mcg tablet; Commonly known as: Vitamin B-12   digoxin 125 MCG tablet; Commonly known as: Lanoxin   famotidine 20 mg tablet; Commonly known as: Pepcid   fLuvoxaMINE 50 mg tablet; Commonly known as: Luvox   folic acid 1 mg tablet; Commonly known as: Folvite   Lactobacillus acidophilus 100 mg (1 billion cell) capsule   levothyroxine 75 mcg capsule; Commonly known as: Tirosint   lubricating eye drops ophthalmic solution   magnesium hydroxide 400 mg/5 mL suspension; Commonly known as: Milk of   Magnesia   melatonin 5 mg tablet   ondansetron 4 mg tablet; Commonly known as: Zofran   white petrolatum-mineral oiL 94-3 % ophthalmic ointment; Commonly known   as: Tears Naturale PM       Test Results Pending At Discharge  Pending Labs       No current pending labs.            Hospital Course   89 yo F with a hx of advanced dementia, Parkinson's disease, [facility] resident, who was admitted after a GLF at her [facility] in an unwitnessed fall in which she suffered L intertrochanteric hip fracture. Ortho consulted and she was taken for ORIF on 6/28. She tolerated surgery well but remained lethargic and confused for x2 days after surgery. Her hgb dropped and she had an ecchymosis around her surgery site. Eliquis was kept on hold after her surgery. She was given x1 unit PRBCs and hgb improved, and remained stable above 8. On 7/2 she was stable and accepted for rehab, however on discussion with her daughter she requested patient be returned to her memory care unit with PT for rehab. She was  discharged and will follow up with PCP and ortho. She was cleared to resume low-dose eliquis at the time of discharge.     Pertinent Physical Exam At Time of Discharge  Physical Exam  Expand All Collapse All    Shanon Smith is a 89 y.o. female on day 4 of admission presenting with Closed intertrochanteric fracture, left, initial encounter.        Subjective  No acute events overnight although she was agitated and required PRN oxycodone for sedation. She is more awake, but still slightly confused. Daughter is at the bedside, questions answered.         Objective[]Expand by Default  Last Recorded Vitals  /85 (BP Location: Right arm, Patient Position: Lying)   Pulse 88   Temp 36.5 °C (97.7 °F) (Temporal)   Resp 20   Wt 70.5 kg (155 lb 8 oz)   SpO2 98%   Intake/Output last 3 Shifts:     Intake/Output Summary (Last 24 hours) at 7/1/2025 1557  Last data filed at 7/1/2025 1332      Gross per 24 hour   Intake 610 ml   Output 625 ml   Net -15 ml         Admission Weight  Weight: 66.2 kg (146 lb) (06/27/25 1956)     Daily Weight  06/27/25 : 70.5 kg (155 lb 8 oz)     Image Results  ECG 12 lead  Atrial fibrillation  Left axis deviation  Minimal voltage criteria for LVH, may be normal variant  Nonspecific ST and T wave abnormality  Abnormal ECG  No previous ECGs available        Physical Exam  Constitutional:       Comments: Appears stated age, minimally interactive   HENT:      Mouth/Throat:      Mouth: Mucous membranes are moist.   Cardiovascular:      Rate and Rhythm: Normal rate and regular rhythm.   Pulmonary:      Effort: Pulmonary effort is normal. No respiratory distress.      Breath sounds: Normal breath sounds.   Abdominal:      General: Abdomen is flat. Bowel sounds are normal.      Palpations: Abdomen is soft.   Musculoskeletal:         General: No swelling, tenderness or deformity. Post-op dressing is clean and dry without bleeding or worsening ecchymosis  Skin:     General: Skin is warm and dry.    Neurological:      Mental Status: She is disoriented.      Comments: awake and alert, interacts and responds to voice       Outpatient Follow-Up  No future appointments.      Kyle Valdes, DO

## 2025-07-10 ENCOUNTER — NURSING HOME VISIT (OUTPATIENT)
Dept: POST ACUTE CARE | Facility: EXTERNAL LOCATION | Age: OVER 89
End: 2025-07-10
Payer: MEDICARE

## 2025-07-10 DIAGNOSIS — Z09 HOSPITAL DISCHARGE FOLLOW-UP: ICD-10-CM

## 2025-07-10 DIAGNOSIS — S72.142A CLOSED INTERTROCHANTERIC FRACTURE, LEFT, INITIAL ENCOUNTER: ICD-10-CM

## 2025-07-10 DIAGNOSIS — F03.C3 SEVERE DEMENTIA WITH MOOD DISTURBANCE, UNSPECIFIED DEMENTIA TYPE (MULTI): ICD-10-CM

## 2025-07-10 DIAGNOSIS — I15.9 SECONDARY HYPERTENSION: ICD-10-CM

## 2025-07-10 DIAGNOSIS — Z78.9 NURSING HOME RESIDENT: ICD-10-CM

## 2025-07-10 DIAGNOSIS — D50.0 BLOOD LOSS ANEMIA: ICD-10-CM

## 2025-07-10 DIAGNOSIS — G20.A1 PARKINSON'S DISEASE, UNSPECIFIED WHETHER DYSKINESIA PRESENT, UNSPECIFIED WHETHER MANIFESTATIONS FLUCTUATE: ICD-10-CM

## 2025-07-10 PROCEDURE — 99309 SBSQ NF CARE MODERATE MDM 30: CPT | Performed by: INTERNAL MEDICINE

## 2025-07-10 NOTE — LETTER
Patient: Shanon Smith  : 8/3/1931    Encounter Date: 07/10/2025    Name: Shanon Smith  : 8/3/1931  MRN: [id number]  Visit Date: 7/10/2025  Chief Complaint: Acute Visit for readmit to [facility] on 2025    HPI: 89 y/o CF who was admitted to [facility] on  from home with family for long term care due to her progressive dementia with behavioral disturbances. Patient was admitted to the secure behavioral care unit at [facility].    Pt suffered a fall on 2025 at the facility and was sent to [facility] for an evaluation where she was found to have a closed IT fracture on left. She underwent ORIF on 2025 with Dr Sidhu. She remained lethargic with increased confusion 2 days postOp. H&H dropped therefore she was given 1u PRBC and eliqus was held briefly. Resumed eliqus prior to discharge. Brought back to [facility] on 2025.    Subjective: Seen and examined today. Sitting up in wheelchair in common area. Pleasantly confused on exam. Family requested a visit due to edema of LLE. Reviewed documentation and hospitalization. Incision is tender but appears to be healing. Staples intact. No s/sx of infection at this time.    I have reviewed nursing notes since my last visit and document any significant changes Reviewed orders, medications, Labs. Reviewed and updated Interval hx and meds reviewed. Reviewed chart looking at current medications, treatments, labs, x-rays etc.     ROS:  As above in subjective. Otherwise, all other systems have been reviewed and are negative for complaint.    Current Outpatient Medications   Medication Instructions   • acetaminophen (TYLENOL) 650 mg, Every 6 hours PRN   • acetaminophen (TYLENOL) 650 mg, oral, Every 6 hours scheduled   • alum-mag hydroxide-simeth (Maalox MAX) 400-400-40 mg/5 mL suspension 10 mL, Nightly   • apixaban (ELIQUIS) 2.5 mg, 2 times daily   • bisacodyl (DULCOLAX) 5 mg, oral, Daily PRN, Do not crush, chew, or split.   • busPIRone (BUSPAR)  5 mg, 2 times daily   • carbidopa-levodopa (Sinemet CR)  mg ER tablet 2 tablets, 3 times daily   • cephalexin (Keflex) 250 mg capsule Take 1 capsule (250 mg) by mouth once daily. Ppx atb Do not fill before July 7, 2025.( To e started after completing cephalexin 500 mg)   • cholecalciferol, vitamin D3, 25 mcg (1,000 unit) tablet,chewable 2 tablets, Daily   • cranberry extract 425 mg capsule 1 capsule, Daily   • cyanocobalamin (VITAMIN B-12) 500 mcg, Daily   • digoxin (Lanoxin) 125 MCG tablet 1 tablet, 4 times weekly   • docusate sodium (COLACE) 100 mg, oral, 2 times daily   • famotidine (PEPCID) 20 mg, Daily   • fLuvoxaMINE (LUVOX) 200 mg, Nightly   • folic acid (FOLVITE) 1 mg, Daily   • Lactobacillus acidophilus 100 mg (1 billion cell) capsule 1 capsule, Daily   • levothyroxine (TIROSINT) 75 mcg, Daily before breakfast   • lubricating eye drops ophthalmic solution 1 drop, 3 times daily   • magnesium hydroxide (Milk of Magnesia) 400 mg/5 mL suspension 30 mL, Daily PRN   • melatonin 5 mg, Nightly PRN   • ondansetron (ZOFRAN) 4 mg, Every 6 hours PRN   • QUEtiapine (SEROQUEL) 25 mg, oral, Nightly   • white petrolatum-mineral oiL (Tears Naturale PM) 94-3 % ointment ophthalmic ointment 1 Application, 3 times daily PRN     Physical Exam:  CONSTITUTIONAL: alert and oriented to self, confusion, no distress, cooperative, guarded  SKIN: Warm & Dry, no lesions, no rashes, bruising, surgical incision well approx, staples intact.  EYES: EOMI, clear sclera  ENMT: Mucous membranes moist, no apparent injury, no lesions seen. [place].   HEAD/NECK: No lymphadenopathy, thyromegaly or JVD.  HEART: Regular rate & rhythm, no murmurs, 2+ equal pulses of the extremities, Normal S1 & S2.  LUNGS: Patent airways, diminished breath sounds with good chest expansion, thorax symmetric  ABDOMEN: Nondistended, soft, slightly tender, +BS, no rebound tenderness or guarding.   EXTREMITIES: Normal extremities, no cyanosis, LLE with edema, staples  intact, bruising present, no s/sx of infection, no contusions, no clubbing  NEUROLOGIC: intact senses, motor, response and reflexes ok, weakness    Results/Data:   Lab Results   Component Value Date    WBC 4.8 07/02/2025    HGB 8.3 (L) 07/02/2025    HCT 25.0 (L) 07/02/2025     (L) 07/02/2025    CHOL 163 12/16/2020    TRIG 112 12/16/2020    HDL 50 (L) 12/16/2020    ALT 3 (L) 06/27/2025    AST 15 06/27/2025     (L) 07/01/2025    K 4.0 07/01/2025    CL 99 07/01/2025    CREATININE 1.34 (H) 07/01/2025    BUN 22 07/01/2025    CO2 29 07/01/2025    TSH 0.71 12/16/2020    INR 1.3 (H) 06/27/2025    HGBA1C 6.0 02/14/2021     Provider Impression:   Dementia with Behavioral Disturbances, Depression, Anxiety  - remain on secure behavioral unit  - continue with fluvoxamine, buspar  - fall precautions  - monitor behaviors  - consult psych as needed    Recent closed IT fracture on left s/p ORIF on 6/28/2025 with Dr Sidhu.  - elevate LLE  - Fu with ortho as advised.   - c/w PRN tylenol for pain    ABL Anemia  - required 1u PRBC postOp  - monitor H&H qwk and PRN  - no s/sx of bleeding    Parkinson's Disease  - continue with carbidopa-levodopa at scheduled times  - monitor    CV- Afib, HTN, CHF  - continue with Eliqus for stroke prevention  - continue with digoxin for rate control  - monitor level PRN    Recurrent UTI's  - continue with suppressive atb with keflex  - continue with cranberry capsules    Constipation Prevention  - c/w stool softeners and laxatives PRN  - having regular bowel movements    Hypothyroidism  - continue with synthroid  - monitor level PRN    GERD  - continue with pepcid    Vitamin D + B12 deficiency  - continue with supplements    Insomnia  - continue with melatonin    Code Status  - [facility]  ----------------  Written by Balbina Mclaughlin RN, acting as a scribe for Dr. Corrales. This note accurately reflects the work and decisions made by Dr. Corrales.     I, Dr. Corrales, attest all medical record entries made  by the scribe were under my direction and were personally dictated by me. I have reviewed the chart and agree that the record accurately reflects my performance of the history, physical exam, and assessment and plan.     Electronically Signed By: Noni Land MD   7/28/25 11:26 AM

## 2025-07-12 LAB
ATRIAL RATE: 468 BPM
Q ONSET: 220 MS
QRS COUNT: 13 BEATS
QRS DURATION: 78 MS
QT INTERVAL: 352 MS
QTC CALCULATION(BAZETT): 401 MS
QTC FREDERICIA: 384 MS
R AXIS: -31 DEGREES
T AXIS: 150 DEGREES
T OFFSET: 396 MS
VENTRICULAR RATE: 78 BPM

## 2025-07-14 ENCOUNTER — HOSPITAL ENCOUNTER (OUTPATIENT)
Dept: RADIOLOGY | Facility: HOSPITAL | Age: OVER 89
Discharge: HOME | End: 2025-07-14
Payer: MEDICARE

## 2025-07-14 ENCOUNTER — APPOINTMENT (OUTPATIENT)
Dept: ORTHOPEDIC SURGERY | Facility: CLINIC | Age: OVER 89
End: 2025-07-14
Payer: MEDICARE

## 2025-07-14 DIAGNOSIS — Z87.81 S/P ORIF (OPEN REDUCTION INTERNAL FIXATION) FRACTURE: ICD-10-CM

## 2025-07-14 DIAGNOSIS — Z98.890 S/P ORIF (OPEN REDUCTION INTERNAL FIXATION) FRACTURE: ICD-10-CM

## 2025-07-14 DIAGNOSIS — S72.142A CLOSED INTERTROCHANTERIC FRACTURE, LEFT, INITIAL ENCOUNTER: Primary | ICD-10-CM

## 2025-07-14 PROCEDURE — 1036F TOBACCO NON-USER: CPT

## 2025-07-14 PROCEDURE — 1160F RVW MEDS BY RX/DR IN RCRD: CPT

## 2025-07-14 PROCEDURE — 1157F ADVNC CARE PLAN IN RCRD: CPT

## 2025-07-14 PROCEDURE — 1159F MED LIST DOCD IN RCRD: CPT

## 2025-07-14 PROCEDURE — 99024 POSTOP FOLLOW-UP VISIT: CPT

## 2025-07-14 PROCEDURE — 73502 X-RAY EXAM HIP UNI 2-3 VIEWS: CPT | Mod: LT

## 2025-07-14 PROCEDURE — 1111F DSCHRG MED/CURRENT MED MERGE: CPT

## 2025-07-14 PROCEDURE — 73502 X-RAY EXAM HIP UNI 2-3 VIEWS: CPT | Mod: LEFT SIDE | Performed by: RADIOLOGY

## 2025-07-14 ASSESSMENT — PAIN - FUNCTIONAL ASSESSMENT: PAIN_FUNCTIONAL_ASSESSMENT: NO/DENIES PAIN

## 2025-07-14 NOTE — PROGRESS NOTES
History of Present Illness  Chief Complaint   Patient presents with    Left Hip - Post-op     6/28/25 Lt Hip ORIF   Patient in office with daughters. Patient returns today denying any significant pain though still some discomfort.  She was in nursing facility when fall occurred and has returned there.  She is working with PT and OT daily, she has been able to walk some with walker with assistance.  Was normally in wheelchair prior to fall and just walking short distances and for transfers. Denies any new neuro deficits. No fever or drainage.     Review of Systems   GENERAL: Negative for malaise, significant weight loss, fever  MUSCULOSKELETAL: see HPI  NEURO:  Negative     Examination  side: left wrist  Healthy incision without erythema, warmth.  There is localized edema about the proximal incision with serous drainage.  Skin slightly macerated in the area. No tenderness on palpation.  Sensation intact median, ulnar and radial nerve distribution   + Opposition of thumb  Good cap refill     Assessment:  Patient status post side: left hip IM nailing and fady insertion after intertrochanteric fracture     Plan  Patient to continue to use left hip to tolerance.  Weight bearing as tolerated with walker  Wound is healing though skin was moist and macerated around proximal incision.  There was some serous drainage on the bandage when it was removed.  Staples were removed by myself without wound dehiscence.  There is some localized edema around that incision.  Need to keep a close eye for infection and return to office or ED for any concern by the nursing facility.  Continue to work with PT and OT at facility.  Follow-up: in 4 weeks with x-ray    Lexis Elizondo PA-C  07/14/25

## 2025-07-28 PROBLEM — D50.0 BLOOD LOSS ANEMIA: Status: ACTIVE | Noted: 2025-07-28

## 2025-07-28 PROBLEM — Z09 HOSPITAL DISCHARGE FOLLOW-UP: Status: ACTIVE | Noted: 2025-07-28

## 2025-07-28 NOTE — PROGRESS NOTES
Name: Shanon Smith  : 8/3/1931  MRN: [id number]  Visit Date: 2025  Chief Complaint: Monthly visit for management of chronic disease.    HPI: 89 y/o CF who was admitted to [facility] on  from home with family for long term care due to her progressive dementia with behavioral disturbances. Patient will be admitted to the secure behavioral care unit at [facility].    Subjective: Seen and examined today. Sitting up in wheelchair in common area. Pleasantly confused on exam. Offers no complaints. Nursing reports no issues.     I have reviewed nursing notes since my last visit and document any significant changes Reviewed orders, medications, Labs. Reviewed and updated Interval hx and meds reviewed. Reviewed chart looking at current medications, treatments, labs, x-rays etc.     ROS:  As above in subjective. Otherwise, all other systems have been reviewed and are negative for complaint.    Current Outpatient Medications   Medication Instructions    acetaminophen (TYLENOL) 650 mg, Every 6 hours PRN    acetaminophen (TYLENOL) 650 mg, oral, Every 6 hours scheduled    alum-mag hydroxide-simeth (Maalox MAX) 400-400-40 mg/5 mL suspension 10 mL, Nightly    apixaban (ELIQUIS) 2.5 mg, 2 times daily    bisacodyl (DULCOLAX) 5 mg, oral, Daily PRN, Do not crush, chew, or split.    busPIRone (BUSPAR) 5 mg, 2 times daily    carbidopa-levodopa (Sinemet CR)  mg ER tablet 2 tablets, 3 times daily    cephalexin (Keflex) 250 mg capsule Take 1 capsule (250 mg) by mouth once daily. Ppx atb Do not fill before 2025.( To e started after completing cephalexin 500 mg)    cholecalciferol, vitamin D3, 25 mcg (1,000 unit) tablet,chewable 2 tablets, Daily    cranberry extract 425 mg capsule 1 capsule, Daily    cyanocobalamin (VITAMIN B-12) 500 mcg, Daily    digoxin (Lanoxin) 125 MCG tablet 1 tablet, 4 times weekly    docusate sodium (COLACE) 100 mg, oral, 2 times daily    famotidine (PEPCID) 20 mg, Daily    fLuvoxaMINE  (LUVOX) 200 mg, Nightly    folic acid (FOLVITE) 1 mg, Daily    Lactobacillus acidophilus 100 mg (1 billion cell) capsule 1 capsule, Daily    levothyroxine (TIROSINT) 75 mcg, Daily before breakfast    lubricating eye drops ophthalmic solution 1 drop, 3 times daily    magnesium hydroxide (Milk of Magnesia) 400 mg/5 mL suspension 30 mL, Daily PRN    melatonin 5 mg, Nightly PRN    ondansetron (ZOFRAN) 4 mg, Every 6 hours PRN    QUEtiapine (SEROQUEL) 25 mg, oral, Nightly    white petrolatum-mineral oiL (Tears Naturale PM) 94-3 % ointment ophthalmic ointment 1 Application, 3 times daily PRN     Physical Exam:  CONSTITUTIONAL: alert and oriented to self, confusion, no distress, cooperative, guarded  SKIN: Warm & Dry, no lesions, no rashes.  EYES: EOMI, clear sclera  ENMT: Mucous membranes moist, no apparent injury, no lesions seen. [place].   HEAD/NECK: No lymphadenopathy, thyromegaly or JVD.  HEART: Regular rate & rhythm, no murmurs, 2+ equal pulses of the extremities, Normal S1 & S2.  LUNGS: Patent airways, CTAB, normal breath sounds with good chest expansion, thorax symmetric  ABDOMEN: Nondistended, soft, slightly tender, +BS, no rebound tenderness or guarding.   EXTREMITIES: Normal extremities, no cyanosis, trace ankle edema, no contusions, no clubbing  NEUROLOGIC: intact senses, motor, response and reflexes ok, weakness    Results/Data:   Lab Results   Component Value Date    WBC 4.8 07/02/2025    HGB 8.3 (L) 07/02/2025    HCT 25.0 (L) 07/02/2025     (L) 07/02/2025    CHOL 163 12/16/2020    TRIG 112 12/16/2020    HDL 50 (L) 12/16/2020    ALT 3 (L) 06/27/2025    AST 15 06/27/2025     (L) 07/01/2025    K 4.0 07/01/2025    CL 99 07/01/2025    CREATININE 1.34 (H) 07/01/2025    BUN 22 07/01/2025    CO2 29 07/01/2025    TSH 0.71 12/16/2020    INR 1.3 (H) 06/27/2025    HGBA1C 6.0 02/14/2021     Provider Impression:   Dementia with Behavioral Disturbances, Depression, Anxiety  - remain on secure behavioral unit  -  continue with fluvoxamine, buspar  - fall precautions  - monitor behaviors  - consult psych as needed    Parkinson's Disease  - continue with carbidopa-levodopa at scheduled times  - monitor    CV- Afib, HTN, CHF  - continue with Eliqus for stroke prevention  - continue with digoxin for rate control  - monitor level PRN    Recurrent UTI's  - continue with suppressive atb with keflex  - continue with cranberry capsules    Constipation Prevention  - c/w stool softeners and laxatives PRN  - having regular bowel movements    Hypothyroidism  - continue with synthroid  - monitor level PRN    GERD  - continue with pepcid    Vitamin D + B12 deficiency  - continue with supplements    Insomnia  - continue with melatonin    Code Status  - [facility]  ----------------  Written by Balbina Mclaughlin RN, acting as a scribe for Dr. Corrales. This note accurately reflects the work and decisions made by Dr. Corrales.     I, Dr. Corrales, attest all medical record entries made by the scribe were under my direction and were personally dictated by me. I have reviewed the chart and agree that the record accurately reflects my performance of the history, physical exam, and assessment and plan.

## 2025-07-28 NOTE — PROGRESS NOTES
Name: Shanon Smith  : 8/3/1931  MRN: [id number]  Visit Date: 7/10/2025  Chief Complaint: Acute Visit for readmit to [facility] on 2025    HPI: 89 y/o CF who was admitted to [facility] on  from home with family for long term care due to her progressive dementia with behavioral disturbances. Patient was admitted to the secure behavioral care unit at [facility].    Pt suffered a fall on 2025 at the facility and was sent to [facility] for an evaluation where she was found to have a closed IT fracture on left. She underwent ORIF on 2025 with Dr Sidhu. She remained lethargic with increased confusion 2 days postOp. H&H dropped therefore she was given 1u PRBC and eliqus was held briefly. Resumed eliqus prior to discharge. Brought back to [facility] on [facility]25.    Subjective: Seen and examined today. Sitting up in wheelchair in common area. Pleasantly confused on exam. Family requested a visit due to edema of LLE. Reviewed documentation and hospitalization. Incision is tender but appears to be healing. Staples intact. No s/sx of infection at this time.    I have reviewed nursing notes since my last visit and document any significant changes Reviewed orders, medications, Labs. Reviewed and updated Interval hx and meds reviewed. Reviewed chart looking at current medications, treatments, labs, x-rays etc.     ROS:  As above in subjective. Otherwise, all other systems have been reviewed and are negative for complaint.    Current Outpatient Medications   Medication Instructions    acetaminophen (TYLENOL) 650 mg, Every 6 hours PRN    acetaminophen (TYLENOL) 650 mg, oral, Every 6 hours scheduled    alum-mag hydroxide-simeth (Maalox MAX) 400-400-40 mg/5 mL suspension 10 mL, Nightly    apixaban (ELIQUIS) 2.5 mg, 2 times daily    bisacodyl (DULCOLAX) 5 mg, oral, Daily PRN, Do not crush, chew, or split.    busPIRone (BUSPAR) 5 mg, 2 times daily    carbidopa-levodopa (Sinemet CR)  mg ER tablet 2  tablets, 3 times daily    cephalexin (Keflex) 250 mg capsule Take 1 capsule (250 mg) by mouth once daily. Ppx atb Do not fill before July 7, 2025.( To e started after completing cephalexin 500 mg)    cholecalciferol, vitamin D3, 25 mcg (1,000 unit) tablet,chewable 2 tablets, Daily    cranberry extract 425 mg capsule 1 capsule, Daily    cyanocobalamin (VITAMIN B-12) 500 mcg, Daily    digoxin (Lanoxin) 125 MCG tablet 1 tablet, 4 times weekly    docusate sodium (COLACE) 100 mg, oral, 2 times daily    famotidine (PEPCID) 20 mg, Daily    fLuvoxaMINE (LUVOX) 200 mg, Nightly    folic acid (FOLVITE) 1 mg, Daily    Lactobacillus acidophilus 100 mg (1 billion cell) capsule 1 capsule, Daily    levothyroxine (TIROSINT) 75 mcg, Daily before breakfast    lubricating eye drops ophthalmic solution 1 drop, 3 times daily    magnesium hydroxide (Milk of Magnesia) 400 mg/5 mL suspension 30 mL, Daily PRN    melatonin 5 mg, Nightly PRN    ondansetron (ZOFRAN) 4 mg, Every 6 hours PRN    QUEtiapine (SEROQUEL) 25 mg, oral, Nightly    white petrolatum-mineral oiL (Tears Naturale PM) 94-3 % ointment ophthalmic ointment 1 Application, 3 times daily PRN     Physical Exam:  CONSTITUTIONAL: alert and oriented to self, confusion, no distress, cooperative, guarded  SKIN: Warm & Dry, no lesions, no rashes, bruising, surgical incision well approx, staples intact.  EYES: EOMI, clear sclera  ENMT: Mucous membranes moist, no apparent injury, no lesions seen. [place].   HEAD/NECK: No lymphadenopathy, thyromegaly or JVD.  HEART: Regular rate & rhythm, no murmurs, 2+ equal pulses of the extremities, Normal S1 & S2.  LUNGS: Patent airways, diminished breath sounds with good chest expansion, thorax symmetric  ABDOMEN: Nondistended, soft, slightly tender, +BS, no rebound tenderness or guarding.   EXTREMITIES: Normal extremities, no cyanosis, LLE with edema, staples intact, bruising present, no s/sx of infection, no contusions, no clubbing  NEUROLOGIC: intact  senses, motor, response and reflexes ok, weakness    Results/Data:   Lab Results   Component Value Date    WBC 4.8 07/02/2025    HGB 8.3 (L) 07/02/2025    HCT 25.0 (L) 07/02/2025     (L) 07/02/2025    CHOL 163 12/16/2020    TRIG 112 12/16/2020    HDL 50 (L) 12/16/2020    ALT 3 (L) 06/27/2025    AST 15 06/27/2025     (L) 07/01/2025    K 4.0 07/01/2025    CL 99 07/01/2025    CREATININE 1.34 (H) 07/01/2025    BUN 22 07/01/2025    CO2 29 07/01/2025    TSH 0.71 12/16/2020    INR 1.3 (H) 06/27/2025    HGBA1C 6.0 02/14/2021     Provider Impression:   Dementia with Behavioral Disturbances, Depression, Anxiety  - remain on secure behavioral unit  - continue with fluvoxamine, buspar  - fall precautions  - monitor behaviors  - consult psych as needed    Recent closed IT fracture on left s/p ORIF on 6/28/2025 with Dr Sidhu.  - elevate LLE  - Fu with ortho as advised.   - c/w PRN tylenol for pain    ABL Anemia  - required 1u PRBC postOp  - monitor H&H qwk and PRN  - no s/sx of bleeding    Parkinson's Disease  - continue with carbidopa-levodopa at scheduled times  - monitor    CV- Afib, HTN, CHF  - continue with Eliqus for stroke prevention  - continue with digoxin for rate control  - monitor level PRN    Recurrent UTI's  - continue with suppressive atb with keflex  - continue with cranberry capsules    Constipation Prevention  - c/w stool softeners and laxatives PRN  - having regular bowel movements    Hypothyroidism  - continue with synthroid  - monitor level PRN    GERD  - continue with pepcid    Vitamin D + B12 deficiency  - continue with supplements    Insomnia  - continue with melatonin    Code Status  - [facility]  ----------------  Written by Balbina Mclaughlin RN, acting as a scribe for Dr. Corrales. This note accurately reflects the work and decisions made by Dr. Corrales.     I, Dr. Corrales, attest all medical record entries made by the scribe were under my direction and were personally dictated by me. I have reviewed the  chart and agree that the record accurately reflects my performance of the history, physical exam, and assessment and plan.

## 2025-08-11 ENCOUNTER — APPOINTMENT (OUTPATIENT)
Dept: ORTHOPEDIC SURGERY | Facility: CLINIC | Age: OVER 89
End: 2025-08-11
Payer: COMMERCIAL

## 2025-08-11 ENCOUNTER — HOSPITAL ENCOUNTER (OUTPATIENT)
Dept: RADIOLOGY | Facility: HOSPITAL | Age: OVER 89
Discharge: HOME | End: 2025-08-11
Payer: MEDICARE

## 2025-08-11 DIAGNOSIS — Z87.81 S/P ORIF (OPEN REDUCTION INTERNAL FIXATION) FRACTURE: ICD-10-CM

## 2025-08-11 DIAGNOSIS — S72.142A CLOSED INTERTROCHANTERIC FRACTURE, LEFT, INITIAL ENCOUNTER: ICD-10-CM

## 2025-08-11 DIAGNOSIS — Z98.890 S/P ORIF (OPEN REDUCTION INTERNAL FIXATION) FRACTURE: ICD-10-CM

## 2025-08-11 PROCEDURE — 73502 X-RAY EXAM HIP UNI 2-3 VIEWS: CPT | Mod: LEFT SIDE | Performed by: RADIOLOGY

## 2025-08-11 PROCEDURE — 1036F TOBACCO NON-USER: CPT

## 2025-08-11 PROCEDURE — 73502 X-RAY EXAM HIP UNI 2-3 VIEWS: CPT | Mod: LT

## 2025-08-11 PROCEDURE — 99024 POSTOP FOLLOW-UP VISIT: CPT

## 2025-08-11 PROCEDURE — 1159F MED LIST DOCD IN RCRD: CPT

## 2025-08-11 PROCEDURE — 1160F RVW MEDS BY RX/DR IN RCRD: CPT

## 2025-08-11 ASSESSMENT — PAIN - FUNCTIONAL ASSESSMENT: PAIN_FUNCTIONAL_ASSESSMENT: NO/DENIES PAIN

## 2025-08-22 ENCOUNTER — APPOINTMENT (OUTPATIENT)
Dept: RADIOLOGY | Facility: HOSPITAL | Age: OVER 89
End: 2025-08-22
Payer: MEDICARE

## 2025-08-22 ENCOUNTER — APPOINTMENT (OUTPATIENT)
Dept: CARDIOLOGY | Facility: HOSPITAL | Age: OVER 89
End: 2025-08-22
Payer: MEDICARE

## 2025-08-22 ENCOUNTER — TELEPHONE (OUTPATIENT)
Dept: ORTHOPEDIC SURGERY | Facility: CLINIC | Age: OVER 89
End: 2025-08-22
Payer: MEDICARE

## 2025-08-22 ENCOUNTER — HOSPITAL ENCOUNTER (EMERGENCY)
Facility: HOSPITAL | Age: OVER 89
Discharge: HOME | End: 2025-08-22
Payer: MEDICARE

## 2025-08-22 VITALS
SYSTOLIC BLOOD PRESSURE: 168 MMHG | TEMPERATURE: 97.5 F | DIASTOLIC BLOOD PRESSURE: 69 MMHG | BODY MASS INDEX: 28.63 KG/M2 | RESPIRATION RATE: 15 BRPM | WEIGHT: 142 LBS | HEIGHT: 59 IN | HEART RATE: 74 BPM | OXYGEN SATURATION: 98 %

## 2025-08-22 DIAGNOSIS — R53.1 GENERALIZED WEAKNESS: Primary | ICD-10-CM

## 2025-08-22 LAB
ALBUMIN SERPL BCP-MCNC: 3.8 G/DL (ref 3.4–5)
ALP SERPL-CCNC: 151 U/L (ref 33–136)
ALT SERPL W P-5'-P-CCNC: 3 U/L (ref 7–45)
ANION GAP SERPL CALC-SCNC: 12 MMOL/L (ref 10–20)
AST SERPL W P-5'-P-CCNC: 26 U/L (ref 9–39)
BASOPHILS # BLD AUTO: 0.05 X10*3/UL (ref 0–0.1)
BASOPHILS NFR BLD AUTO: 0.9 %
BILIRUB SERPL-MCNC: 0.4 MG/DL (ref 0–1.2)
BUN SERPL-MCNC: 14 MG/DL (ref 6–23)
CALCIUM SERPL-MCNC: 9 MG/DL (ref 8.6–10.3)
CARDIAC TROPONIN I PNL SERPL HS: 35 NG/L (ref 0–13)
CARDIAC TROPONIN I PNL SERPL HS: 35 NG/L (ref 0–13)
CHLORIDE SERPL-SCNC: 102 MMOL/L (ref 98–107)
CO2 SERPL-SCNC: 30 MMOL/L (ref 21–32)
CREAT SERPL-MCNC: 1.06 MG/DL (ref 0.5–1.05)
EGFRCR SERPLBLD CKD-EPI 2021: 49 ML/MIN/1.73M*2
EOSINOPHIL # BLD AUTO: 0.12 X10*3/UL (ref 0–0.4)
EOSINOPHIL NFR BLD AUTO: 2.2 %
ERYTHROCYTE [DISTWIDTH] IN BLOOD BY AUTOMATED COUNT: 14 % (ref 11.5–14.5)
GLUCOSE SERPL-MCNC: 131 MG/DL (ref 74–99)
HCT VFR BLD AUTO: 36.2 % (ref 36–46)
HGB BLD-MCNC: 11.7 G/DL (ref 12–16)
IMM GRANULOCYTES # BLD AUTO: 0.02 X10*3/UL (ref 0–0.5)
IMM GRANULOCYTES NFR BLD AUTO: 0.4 % (ref 0–0.9)
INR PPP: 1.4 (ref 0.9–1.1)
LACTATE SERPL-SCNC: 1.4 MMOL/L (ref 0.4–2)
LYMPHOCYTES # BLD AUTO: 1.78 X10*3/UL (ref 0.8–3)
LYMPHOCYTES NFR BLD AUTO: 32 %
MAGNESIUM SERPL-MCNC: 1.98 MG/DL (ref 1.6–2.4)
MCH RBC QN AUTO: 30.2 PG (ref 26–34)
MCHC RBC AUTO-ENTMCNC: 32.3 G/DL (ref 32–36)
MCV RBC AUTO: 94 FL (ref 80–100)
MONOCYTES # BLD AUTO: 0.48 X10*3/UL (ref 0.05–0.8)
MONOCYTES NFR BLD AUTO: 8.6 %
NEUTROPHILS # BLD AUTO: 3.12 X10*3/UL (ref 1.6–5.5)
NEUTROPHILS NFR BLD AUTO: 55.9 %
NRBC BLD-RTO: 0 /100 WBCS (ref 0–0)
PLATELET # BLD AUTO: 233 X10*3/UL (ref 150–450)
POTASSIUM SERPL-SCNC: 4 MMOL/L (ref 3.5–5.3)
PROT SERPL-MCNC: 6.3 G/DL (ref 6.4–8.2)
PROTHROMBIN TIME: 15.6 SECONDS (ref 9.8–12.4)
RBC # BLD AUTO: 3.87 X10*6/UL (ref 4–5.2)
SODIUM SERPL-SCNC: 140 MMOL/L (ref 136–145)
WBC # BLD AUTO: 5.6 X10*3/UL (ref 4.4–11.3)

## 2025-08-22 PROCEDURE — 84484 ASSAY OF TROPONIN QUANT: CPT | Performed by: PHYSICIAN ASSISTANT

## 2025-08-22 PROCEDURE — 80053 COMPREHEN METABOLIC PANEL: CPT | Performed by: PHYSICIAN ASSISTANT

## 2025-08-22 PROCEDURE — 75635 CT ANGIO ABDOMINAL ARTERIES: CPT

## 2025-08-22 PROCEDURE — 83735 ASSAY OF MAGNESIUM: CPT | Performed by: PHYSICIAN ASSISTANT

## 2025-08-22 PROCEDURE — 96360 HYDRATION IV INFUSION INIT: CPT

## 2025-08-22 PROCEDURE — 85610 PROTHROMBIN TIME: CPT | Performed by: PHYSICIAN ASSISTANT

## 2025-08-22 PROCEDURE — 99285 EMERGENCY DEPT VISIT HI MDM: CPT | Mod: 25

## 2025-08-22 PROCEDURE — 85025 COMPLETE CBC W/AUTO DIFF WBC: CPT | Performed by: PHYSICIAN ASSISTANT

## 2025-08-22 PROCEDURE — 75635 CT ANGIO ABDOMINAL ARTERIES: CPT | Performed by: RADIOLOGY

## 2025-08-22 PROCEDURE — 36415 COLL VENOUS BLD VENIPUNCTURE: CPT | Performed by: PHYSICIAN ASSISTANT

## 2025-08-22 PROCEDURE — 70450 CT HEAD/BRAIN W/O DYE: CPT | Performed by: STUDENT IN AN ORGANIZED HEALTH CARE EDUCATION/TRAINING PROGRAM

## 2025-08-22 PROCEDURE — 2500000004 HC RX 250 GENERAL PHARMACY W/ HCPCS (ALT 636 FOR OP/ED): Performed by: PHYSICIAN ASSISTANT

## 2025-08-22 PROCEDURE — 93005 ELECTROCARDIOGRAM TRACING: CPT

## 2025-08-22 PROCEDURE — 70450 CT HEAD/BRAIN W/O DYE: CPT

## 2025-08-22 PROCEDURE — 2550000001 HC RX 255 CONTRASTS: Performed by: PHYSICIAN ASSISTANT

## 2025-08-22 PROCEDURE — 83605 ASSAY OF LACTIC ACID: CPT | Performed by: PHYSICIAN ASSISTANT

## 2025-08-22 RX ADMIN — IOHEXOL 75 ML: 350 INJECTION, SOLUTION INTRAVENOUS at 17:54

## 2025-08-22 RX ADMIN — SODIUM CHLORIDE 500 ML: 9 INJECTION, SOLUTION INTRAVENOUS at 16:47

## 2025-08-22 ASSESSMENT — LIFESTYLE VARIABLES
TOTAL SCORE: 0
EVER FELT BAD OR GUILTY ABOUT YOUR DRINKING: NO
HAVE YOU EVER FELT YOU SHOULD CUT DOWN ON YOUR DRINKING: NO
HAVE PEOPLE ANNOYED YOU BY CRITICIZING YOUR DRINKING: NO
EVER HAD A DRINK FIRST THING IN THE MORNING TO STEADY YOUR NERVES TO GET RID OF A HANGOVER: NO

## 2025-08-22 ASSESSMENT — PAIN - FUNCTIONAL ASSESSMENT: PAIN_FUNCTIONAL_ASSESSMENT: 0-10

## 2025-08-22 ASSESSMENT — PAIN SCALES - GENERAL: PAINLEVEL_OUTOF10: 0 - NO PAIN

## 2025-08-27 ENCOUNTER — TELEPHONE (OUTPATIENT)
Dept: ORTHOPEDIC SURGERY | Facility: CLINIC | Age: OVER 89
End: 2025-08-27
Payer: MEDICARE

## 2025-08-28 LAB
Q ONSET: 224 MS
QRS COUNT: 12 BEATS
QRS DURATION: 84 MS
QT INTERVAL: 320 MS
QTC CALCULATION(BAZETT): 355 MS
QTC FREDERICIA: 343 MS
R AXIS: -36 DEGREES
T AXIS: 105 DEGREES
T OFFSET: 384 MS
VENTRICULAR RATE: 74 BPM

## 2025-08-29 DIAGNOSIS — M21.371 RIGHT FOOT DROP: ICD-10-CM

## 2025-08-29 DIAGNOSIS — M54.50 LUMBAR PAIN: ICD-10-CM

## 2025-08-29 DIAGNOSIS — M54.6 ACUTE MIDLINE THORACIC BACK PAIN: ICD-10-CM

## (undated) DEVICE — KIT, MINOR, DOUBLE BASIN

## (undated) DEVICE — SUTURE, VICRYL, 0, 27 IN, CT-1, UNDYED

## (undated) DEVICE — Device

## (undated) DEVICE — PIN, PRECISION TAPERED, 3.2/3.9X450MM

## (undated) DEVICE — K-WIRE, 03.2 X 450MM

## (undated) DEVICE — NEEDLE, SAFETY, 21 G X 1.5 IN

## (undated) DEVICE — COVER, BAND BAG, SNAP, 40 X 30 IN

## (undated) DEVICE — DRILL, LOCKING, 4.2 X 360MM

## (undated) DEVICE — DRAPE, SHEET, FAN FOLDED, HALF, 44 X 58 IN, DISPOSABLE, LF, STERILE

## (undated) DEVICE — STAPLER, SKIN, PLUS, WIDE, 35

## (undated) DEVICE — DRAPE, ISOLATION, ANTIMICROBIAL, IOBAN, W/FILM & POUCH, LARGE, 32 X 70 CM

## (undated) DEVICE — APPLICATOR, CHLORAPREP, W/ORANGE TINT, 26ML

## (undated) DEVICE — DRESSING, MEPILEX BORDER AG, 3 X 3

## (undated) DEVICE — SUTURE, CTD, VICRYL, 2-0, UND, BR, CT-2

## (undated) DEVICE — COVER, TABLE, 44X90

## (undated) DEVICE — DRAPE PACK, MINOR, CUSTOM, GEAUGA